# Patient Record
Sex: FEMALE | Race: BLACK OR AFRICAN AMERICAN | Employment: FULL TIME | URBAN - METROPOLITAN AREA
[De-identification: names, ages, dates, MRNs, and addresses within clinical notes are randomized per-mention and may not be internally consistent; named-entity substitution may affect disease eponyms.]

---

## 2017-03-16 ENCOUNTER — HOSPITAL ENCOUNTER (OUTPATIENT)
Dept: MRI IMAGING | Age: 42
Discharge: HOME | End: 2017-03-16
Attending: PAIN MEDICINE
Payer: COMMERCIAL

## 2017-03-16 ENCOUNTER — OFFICE VISIT (OUTPATIENT)
Dept: FAMILY MEDICINE CLINIC | Age: 42
End: 2017-03-16

## 2017-03-16 VITALS
HEART RATE: 83 BPM | TEMPERATURE: 98.7 F | BODY MASS INDEX: 46.06 KG/M2 | WEIGHT: 286.6 LBS | OXYGEN SATURATION: 97 % | RESPIRATION RATE: 16 BRPM | HEIGHT: 66 IN | DIASTOLIC BLOOD PRESSURE: 78 MMHG | SYSTOLIC BLOOD PRESSURE: 111 MMHG

## 2017-03-16 DIAGNOSIS — Z83.3 FAMILY HISTORY OF DIABETES MELLITUS: ICD-10-CM

## 2017-03-16 DIAGNOSIS — G43.009 MIGRAINE WITHOUT AURA AND WITHOUT STATUS MIGRAINOSUS, NOT INTRACTABLE: ICD-10-CM

## 2017-03-16 DIAGNOSIS — M54.50 LOW BACK PAIN: ICD-10-CM

## 2017-03-16 DIAGNOSIS — K21.9 GASTROESOPHAGEAL REFLUX DISEASE WITHOUT ESOPHAGITIS: ICD-10-CM

## 2017-03-16 DIAGNOSIS — M51.36 DEGENERATIVE DISC DISEASE, LUMBAR: ICD-10-CM

## 2017-03-16 DIAGNOSIS — J45.20 MILD INTERMITTENT ASTHMA WITHOUT COMPLICATION: Primary | ICD-10-CM

## 2017-03-16 DIAGNOSIS — E55.9 VITAMIN D DEFICIENCY: ICD-10-CM

## 2017-03-16 DIAGNOSIS — Z98.51 H/O TUBAL LIGATION: ICD-10-CM

## 2017-03-16 DIAGNOSIS — I10 ESSENTIAL HYPERTENSION: ICD-10-CM

## 2017-03-16 DIAGNOSIS — Z79.899 LONG-TERM USE OF HIGH-RISK MEDICATION: ICD-10-CM

## 2017-03-16 DIAGNOSIS — D50.0 IRON DEFICIENCY ANEMIA DUE TO CHRONIC BLOOD LOSS: ICD-10-CM

## 2017-03-16 DIAGNOSIS — G35 MULTIPLE SCLEROSIS (HCC): ICD-10-CM

## 2017-03-16 PROCEDURE — 72148 MRI LUMBAR SPINE W/O DYE: CPT

## 2017-03-16 RX ORDER — ERGOCALCIFEROL 1.25 MG/1
50000 CAPSULE ORAL
Qty: 12 CAP | Refills: 1 | Status: SHIPPED | OUTPATIENT
Start: 2017-03-16 | End: 2018-02-14 | Stop reason: ALTCHOICE

## 2017-03-16 RX ORDER — OMEPRAZOLE 20 MG/1
CAPSULE, DELAYED RELEASE ORAL
Qty: 90 CAP | Refills: 5 | Status: CANCELLED | OUTPATIENT
Start: 2017-03-16

## 2017-03-16 RX ORDER — TERIFLUNOMIDE 14 MG/1
TABLET, FILM COATED ORAL
COMMUNITY
Start: 2017-03-07 | End: 2018-08-29 | Stop reason: ALTCHOICE

## 2017-03-16 RX ORDER — OMEPRAZOLE 40 MG/1
CAPSULE, DELAYED RELEASE ORAL
COMMUNITY
Start: 2017-03-14 | End: 2017-03-16 | Stop reason: SDUPTHER

## 2017-03-16 RX ORDER — LOSARTAN POTASSIUM 50 MG/1
50 TABLET ORAL DAILY
Qty: 90 TAB | Refills: 0 | Status: SHIPPED | OUTPATIENT
Start: 2017-03-16 | End: 2017-05-30 | Stop reason: SDUPTHER

## 2017-03-16 RX ORDER — CETIRIZINE HCL 10 MG
TABLET ORAL
Refills: 1 | COMMUNITY
Start: 2016-12-08 | End: 2018-07-27 | Stop reason: SDUPTHER

## 2017-03-16 RX ORDER — ERGOCALCIFEROL 1.25 MG/1
CAPSULE ORAL
COMMUNITY
Start: 2017-03-13 | End: 2017-03-16 | Stop reason: SDUPTHER

## 2017-03-16 RX ORDER — FLUTICASONE PROPIONATE AND SALMETEROL 250; 50 UG/1; UG/1
1 POWDER RESPIRATORY (INHALATION) EVERY 12 HOURS
COMMUNITY
End: 2017-06-05 | Stop reason: SDUPTHER

## 2017-03-16 RX ORDER — HYDROCHLOROTHIAZIDE 25 MG/1
25 TABLET ORAL DAILY
Qty: 90 TAB | Refills: 0 | Status: SHIPPED | OUTPATIENT
Start: 2017-03-16 | End: 2017-05-30 | Stop reason: SDUPTHER

## 2017-03-16 RX ORDER — OMEPRAZOLE 40 MG/1
40 CAPSULE, DELAYED RELEASE ORAL DAILY
Qty: 90 CAP | Refills: 0 | Status: SHIPPED | OUTPATIENT
Start: 2017-03-16 | End: 2017-05-30 | Stop reason: SDUPTHER

## 2017-03-16 NOTE — PROGRESS NOTES
Serg Alcantar is a 40 y.o. female, who's new to our practice. Previous PCP is Leo Montes NP,      She has moved. Multiple medical issue reviewed and added to chart personally. MS: see's neurologist    Migraine HA, doing well, controlled. Take Fioricet PRN. HTN: BP at goal.    Asthma: mild, controlled. Anemia of iron deficiency: history of this, does have symptoms of fatigue. Will check labs. GERD: symptoms. Denies N/V, melena or blood in stool. Will start on prilosec. Reviewed: active problem list, medication list, allergies, family history, social history, notes from last encounter, lab results    A comprehensive review of systems was negative except for that written in the HPI. No Known Allergies  Current Outpatient Prescriptions on File Prior to Visit   Medication Sig Dispense Refill    albuterol (PROVENTIL VENTOLIN) 2.5 mg /3 mL (0.083 %) nebulizer solution 3 mL by Nebulization route every four (4) hours as needed for Wheezing. 200 Each 5    albuterol (PROVENTIL HFA) 90 mcg/actuation inhaler Take 2 Puffs by inhalation every four (4) hours as needed for Wheezing (and cough). 1 Inhaler 1    butalbital-acetaminophen-caffeine (FIORICET, ESGIC) -40 mg per tablet Take 1 Tab by mouth every six (6) hours as needed for Pain. Max Daily Amount: 4 Tabs. Max 6 /24 hours 30 Tab 1     No current facility-administered medications on file prior to visit.       Patient Active Problem List   Diagnosis Code    HTN (hypertension) I10    Multiple sclerosis ([facility] [address] G35    Heavy menstrual period N92.0    Anemia D64.9    Obesity, Class III, BMI 40-49.9 (morbid obesity) (HCC) E66.01    Mild intermittent asthma without complication Z08.19    Vitamin D deficiency E55.9    Iron deficiency anemia due to chronic blood loss D50.0    Migraine without aura and without status migrainosus, not intractable G43.009    Family history of diabetes mellitus Z83.3    H/O tubal ligation Z98.51  Gastroesophageal reflux disease without esophagitis K21.9       Visit Vitals    /78 (BP 1 Location: Right arm, BP Patient Position: Sitting)    Pulse 83    Temp 98.7 °F (37.1 °C) (Oral)    Resp 16    Ht 5' 6\" (1.676 m)    Wt 286 lb 9.6 oz (130 kg)    LMP 03/02/2017    SpO2 97%    BMI 46.26 kg/m2     General appearance: alert, cooperative, no distress, appears stated age  Head: Normocephalic, without obvious abnormality, atraumatic  Eyes: conjunctivae/corneas clear. PERRL, EOM's intact. Lungs: clear to auscultation bilaterally  Heart: regular rate and rhythm, S1, S2 normal, no murmur, click, rub or gallop  Extremities: extremities normal, atraumatic, no cyanosis or edema  Pulses: 2+ and symmetric  Neurologic: Grossly normal      Assessment/Plans:    [address] Paz was seen today for establish care. Diagnoses and all orders for this visit:    Mild intermittent asthma without complication    Essential hypertension  -     hydroCHLOROthiazide (HYDRODIURIL) 25 mg tablet; Take 1 Tab by mouth daily. -     losartan (COZAAR) 50 mg tablet; Take 1 Tab by mouth daily.  -     TSH RFX ON ABNORMAL TO FREE T4  -     METABOLIC PANEL, COMPREHENSIVE    Multiple sclerosis (HCC)    Vitamin D deficiency  -     ergocalciferol (ERGOCALCIFEROL) 50,000 unit capsule; Take 1 Cap by mouth every seven (7) days. Iron deficiency anemia due to chronic blood loss  -     CBC W/O DIFF    Migraine without aura and without status migrainosus, not intractable    Family history of diabetes mellitus    H/O tubal ligation    Gastroesophageal reflux disease without esophagitis  -     omeprazole (PRILOSEC) 40 mg capsule; Take 1 Cap by mouth daily. Long-term use of high-risk medication  -     TSH RFX ON ABNORMAL TO FREE T4  -     METABOLIC PANEL, COMPREHENSIVE  -     CBC W/O DIFF    Discussed plans, risk/benefits of treatments/observations. Through the use of shared decision making, above plans were agreed upon.    Medication compliance advised. Patient verbalized understanding.      Follow-up Disposition:  Return in about 2 weeks (around 3/30/2017) for annual exam.      Channing Rizzo MD  3/16/2017

## 2017-03-16 NOTE — PROGRESS NOTES
Chief Complaint   Patient presents with   [facility].     wants thyroid and liver test per neurologist Dr. Diann Habermann

## 2017-03-16 NOTE — MR AVS SNAPSHOT
Visit Information Date & Time Provider Department Dept. Phone Encounter #  
 3/16/2017  2:30 PM Sujatha Syed MD [facility] at [address] [id number] Follow-up Instructions Return for annual exam.  
  
Your Appointments 3/21/2017  9:15 AM  
YEAR CHECK UP with Michael Guadalupe MD  
[facility] ([address]) Appt Note: annual exam  
 [place] [address] [facility] [id number]  
[phone]  
  
   
 [place] [address] Upcoming Health Maintenance Date Due DTaP/Tdap/Td series (1 - Tdap) 3/2/1997 INFLUENZA AGE 9 TO ADULT 8/1/2016 PAP AKA CERVICAL CYTOLOGY 12/3/2018 Allergies as of 3/16/2017  Review Complete On: 3/16/2017 By: Sujatha Syed MD  
 No Known Allergies Current Immunizations  Never Reviewed Name Date Influenza Vaccine Intradermal PF 10/9/2015 Not reviewed this visit You Were Diagnosed With   
  
 Codes Comments Mild intermittent asthma without complication    -  Primary ICD-10-CM: J45.20 ICD-9-CM: 493.90 Essential hypertension     ICD-10-CM: I10 
ICD-9-CM: 401.9 Multiple sclerosis ([email or web address]     ICD-10-CM: G35 
ICD-9-CM: 151 Vitamin D deficiency     ICD-10-CM: E55.9 ICD-9-CM: 268.9 Iron deficiency anemia due to chronic blood loss     ICD-10-CM: D50.0 ICD-9-CM: 280.0 Migraine without aura and without status migrainosus, not intractable     ICD-10-CM: P05.873 ICD-9-CM: 346.10 Family history of diabetes mellitus     ICD-10-CM: Z83.3 ICD-9-CM: V18.0 H/O tubal ligation     ICD-10-CM: Z98.51 
ICD-9-CM: V26.51 Gastroesophageal reflux disease without esophagitis     ICD-10-CM: K21.9 ICD-9-CM: 530.81 Long-term use of high-risk medication     ICD-10-CM: Z79.899 ICD-9-CM: V58.69 Vitals BP Pulse Temp Resp Height(growth percentile) Weight(growth percentile) 111/78 (BP 1 Location: Right arm, BP Patient Position: Sitting) 83 98.7 °F (37.1 °C) (Oral) 16 5' 6\" (1.676 m) 286 lb 9.6 oz (130 kg) LMP SpO2 BMI OB Status Smoking Status 03/02/2017 97% 46.26 kg/m2 Having regular periods Never Smoker BMI and BSA Data Body Mass Index Body Surface Area  
 46.26 kg/m 2 2.46 m 2 Preferred Pharmacy Pharmacy Name Phone [facility] [phone] Your Updated Medication List  
  
   
This list is accurate as of: 3/16/17  3:24 PM.  Always use your most recent med list.  
  
  
  
  
 Kash Christians 250-50 mcg/dose diskus inhaler Generic drug:  fluticasone-salmeterol Take 1 Puff by inhalation every twelve (12) hours. * albuterol 90 mcg/actuation inhaler Commonly known as:  PROVENTIL HFA Take 2 Puffs by inhalation every four (4) hours as needed for Wheezing (and cough). * albuterol 2.5 mg /3 mL (0.083 %) nebulizer solution Commonly known as:  PROVENTIL VENTOLIN  
3 mL by Nebulization route every four (4) hours as needed for Wheezing. AUBAGIO 14 mg Tab Generic drug:  teriflunomide  
  
 butalbital-acetaminophen-caffeine -40 mg per tablet Commonly known as:  Sang Pickles Take 1 Tab by mouth every six (6) hours as needed for Pain. Max Daily Amount: 4 Tabs. Max 6 /24 hours  
  
 cetirizine 10 mg tablet Commonly known as:  ZYRTEC TK 1 T PO ONCE DAILY  
  
 ergocalciferol 50,000 unit capsule Commonly known as:  ERGOCALCIFEROL Take 1 Cap by mouth every seven (7) days. hydroCHLOROthiazide 25 mg tablet Commonly known as:  HYDRODIURIL Take 1 Tab by mouth daily. losartan 50 mg tablet Commonly known as:  COZAAR Take 1 Tab by mouth daily. omeprazole 40 mg capsule Commonly known as:  PRILOSEC Take 1 Cap by mouth daily. * Notice: This list has 2 medication(s) that are the same as other medications prescribed for you. Read the directions carefully, and ask your doctor or other care provider to review them with you. Prescriptions Sent to Pharmacy Refills  
 omeprazole (PRILOSEC) 40 mg capsule 0 Sig: Take 1 Cap by mouth daily. Class: Normal  
 Pharmacy: [facility] [place] - [address] [facility]  AT [address] [place] [address] Ph #: [phone] Route: Oral  
 ergocalciferol (ERGOCALCIFEROL) 50,000 unit capsule 1 Sig: Take 1 Cap by mouth every seven (7) days. Class: Normal  
 Pharmacy: [facility] at [facility] [address] [facility]  AT [address] [place] [address] Ph #: [phone] Route: Oral  
 hydroCHLOROthiazide (HYDRODIURIL) 25 mg tablet 0 Sig: Take 1 Tab by mouth daily. Class: Normal  
 Pharmacy: [facility] at [facility] [address] [facility]  AT [address] [place] [address] Ph #: [phone] Route: Oral  
 losartan (COZAAR) 50 mg tablet 0 Sig: Take 1 Tab by mouth daily. Class: Normal  
 Pharmacy: [facility] [address] [facility]  AT [address] [place] [address] Ph #: [phone] Route: Oral  
  
We Performed the Following CBC W/O DIFF [89678 CPT(R)] METABOLIC PANEL, COMPREHENSIVE [51764 CPT(R)] TSH RFX ON ABNORMAL TO FREE T4 [[id number] Custom] Follow-up Instructions Return for annual exam. To-Do List   
 03/16/2017  6:00 PM  
  Appointment with [address] [address] [facility] 1 at [facility] ([phone]) 1. Please bring a list or a bag of your current medications to your appointment 2. Please be sure to remove ALL hair clips, pins, extensions, etc., prior to arriving for your MRI procedure.  3. Bring any non [facility] films or CDs pertaining to the area being imaged with you on the day of appointment. 4. A written order with a valid diagnosis and Physicians  signature is required for all scheduled tests. 5. Check in at registration 30min before your appointment time unless you were instructed to do otherwise. Introducing [facility] SERVICES! Dear Denisse Bautista: Thank you for requesting a [employer] account. Our records indicate that you already have an active Orche[employer] account. You can access your account anytime at [email or web address] Did you know that you can access your hospital and ER discharge instructions at any time in [employer]? You can also review all of your test results from your hospital stay or ER visit. Additional Information If you have questions, please visit the Frequently Asked Questions section of the Orche[employer]ate website at [email or web address] Remember, Orche[employer]ate is NOT to be used for urgent needs. For medical emergencies, dial 911. Now available from your iPhone and Android! Please provide this summary of care documentation to your next provider. If you have any questions after today's visit, please call [phone].

## 2017-03-17 LAB
ALBUMIN SERPL-MCNC: 4 G/DL (ref 3.5–5.5)
ALBUMIN/GLOB SERPL: 1.3 {RATIO} (ref 1.2–2.2)
ALP SERPL-CCNC: 46 IU/L (ref 39–117)
ALT SERPL-CCNC: 20 IU/L (ref 0–32)
AST SERPL-CCNC: 24 IU/L (ref 0–40)
BILIRUB SERPL-MCNC: <0.2 MG/DL (ref 0–1.2)
BUN SERPL-MCNC: 7 MG/DL (ref 6–24)
BUN/CREAT SERPL: 8 (ref 9–23)
CALCIUM SERPL-MCNC: 9.2 MG/DL (ref 8.7–10.2)
CHLORIDE SERPL-SCNC: 101 MMOL/L (ref 96–106)
CO2 SERPL-SCNC: 25 MMOL/L (ref 18–29)
CREAT SERPL-MCNC: 0.86 MG/DL (ref 0.57–1)
ERYTHROCYTE [DISTWIDTH] IN BLOOD BY AUTOMATED COUNT: 21.9 % (ref 12.3–15.4)
GLOBULIN SER CALC-MCNC: 3 G/DL (ref 1.5–4.5)
GLUCOSE SERPL-MCNC: 83 MG/DL (ref 65–99)
HCT VFR BLD AUTO: 26.3 % (ref 34–46.6)
HGB BLD-MCNC: 7.2 G/DL (ref 11.1–15.9)
MCH RBC QN AUTO: 17.1 PG (ref 26.6–33)
MCHC RBC AUTO-ENTMCNC: 27.4 G/DL (ref 31.5–35.7)
MCV RBC AUTO: 63 FL (ref 79–97)
PLATELET # BLD AUTO: 561 X10E3/UL (ref 150–379)
POTASSIUM SERPL-SCNC: 4.1 MMOL/L (ref 3.5–5.2)
PROT SERPL-MCNC: 7 G/DL (ref 6–8.5)
RBC # BLD AUTO: 4.2 X10E6/UL (ref 3.77–5.28)
SODIUM SERPL-SCNC: 142 MMOL/L (ref 134–144)
TSH SERPL DL<=0.005 MIU/L-ACNC: 1.32 UIU/ML (ref 0.45–4.5)
WBC # BLD AUTO: 6.9 X10E3/UL (ref 3.4–10.8)

## 2017-03-21 ENCOUNTER — HOSPITAL ENCOUNTER (OUTPATIENT)
Dept: LAB | Age: 42
Discharge: HOME | End: 2017-03-21
Payer: COMMERCIAL

## 2017-03-21 ENCOUNTER — OFFICE VISIT (OUTPATIENT)
Dept: OBGYN CLINIC | Age: 42
End: 2017-03-21

## 2017-03-21 VITALS
HEIGHT: 66 IN | BODY MASS INDEX: 45.61 KG/M2 | RESPIRATION RATE: 18 BRPM | DIASTOLIC BLOOD PRESSURE: 77 MMHG | TEMPERATURE: 98.9 F | HEART RATE: 74 BPM | WEIGHT: 283.8 LBS | SYSTOLIC BLOOD PRESSURE: 125 MMHG

## 2017-03-21 DIAGNOSIS — N92.0 MENORRHAGIA WITH REGULAR CYCLE: ICD-10-CM

## 2017-03-21 DIAGNOSIS — E66.01 OBESITY, CLASS III, BMI 40-49.9 (MORBID OBESITY) (HCC): ICD-10-CM

## 2017-03-21 DIAGNOSIS — Z12.31 VISIT FOR SCREENING MAMMOGRAM: ICD-10-CM

## 2017-03-21 DIAGNOSIS — G35 MULTIPLE SCLEROSIS (HCC): ICD-10-CM

## 2017-03-21 DIAGNOSIS — D50.0 IRON DEFICIENCY ANEMIA DUE TO CHRONIC BLOOD LOSS: ICD-10-CM

## 2017-03-21 DIAGNOSIS — I10 ESSENTIAL HYPERTENSION: ICD-10-CM

## 2017-03-21 DIAGNOSIS — Z01.419 WELL WOMAN EXAM WITH ROUTINE GYNECOLOGICAL EXAM: Primary | ICD-10-CM

## 2017-03-21 PROCEDURE — 87624 HPV HI-RISK TYP POOLED RSLT: CPT | Performed by: OBSTETRICS & GYNECOLOGY

## 2017-03-21 PROCEDURE — 88175 CYTOPATH C/V AUTO FLUID REDO: CPT | Performed by: OBSTETRICS & GYNECOLOGY

## 2017-03-21 RX ORDER — ACETAMINOPHEN AND CODEINE PHOSPHATE 120; 12 MG/5ML; MG/5ML
1 SOLUTION ORAL DAILY
Qty: 1 PACKAGE | Refills: 6 | Status: SHIPPED | OUTPATIENT
Start: 2017-03-21 | End: 2017-11-27 | Stop reason: SDUPTHER

## 2017-03-21 NOTE — PROGRESS NOTES
Chief Complaint   Patient presents with    Annual Exam     Patient presents in stable condition; states she has vaginal itching after her menstrual cycle. Patient also states her iron level is very low.

## 2017-03-21 NOTE — PROGRESS NOTES
SUBJECTIVE: Amber Del Valle is a 40 y.o. female who presents desire for annual well woman exam. Patient's last menstrual period was 2017. Complaints of low energy, tiredness and gets winded easily. GYN History  Dysmenorrhea:  NO  Contraception:  tubal ligation  Sexually transmitted diseases/infections: denies  Urinary symptoms:  NO  Dyspareunia: NO    Last pap:   The prior Pap result: Normal.    Past Medical History:   Diagnosis Date    Anemia     Family history of diabetes mellitus 3/16/2017    Gastroesophageal reflux disease without esophagitis 3/16/2017    H/O tubal ligation 3/16/2017    HTN (hypertension) 8/10/2011    Hypertension     Iron deficiency anemia due to chronic blood loss 3/16/2017    Iron deficiency anemia due to chronic blood loss 3/16/2017    Migraine without aura and without status migrainosus, not intractable 3/16/2017    Migraine without aura and without status migrainosus, not intractable 3/16/2017    Mild intermittent asthma without complication 686    Mild intermittent asthma without complication 8643    MS (multiple sclerosis) ([facility] 10/08    using clinical trial medications    Vitamin D deficiency 3/16/2017    Vitamin D deficiency 3/16/2017       Past Surgical History:   Procedure Laterality Date    HX  SECTION      x3; , , &     LAP,TUBAL CAUTERY         Family History   Problem Relation Age of Onset    Diabetes Mother     Hypertension Mother        Social History     Social History    Marital status:      Spouse name: N/A    Number of children: N/A    Years of education: N/A     Occupational History    Not on file.      Social History Main Topics    Smoking status: Never Smoker    Smokeless tobacco: Never Used    Alcohol use 1.0 oz/week     1 Glasses of wine, 1 Shots of liquor per week      Comment: socially    Drug use: No    Sexual activity: Not Currently     Partners: Male     Birth control/ protection: Surgical     Other Topics Concern    Not on file     Social History Narrative       Current Outpatient Prescriptions   Medication Sig Dispense Refill    norethindrone (MICRONOR) 0.35 mg tab Take 1 Tab by mouth daily. 1 Package 6    AUBAGIO 14 mg tab       cetirizine (ZYRTEC) 10 mg tablet TK 1 T PO ONCE DAILY  1    omeprazole (PRILOSEC) 40 mg capsule Take 1 Cap by mouth daily. 90 Cap 0    ergocalciferol (ERGOCALCIFEROL) 50,000 unit capsule Take 1 Cap by mouth every seven (7) days. 12 Cap 1    hydroCHLOROthiazide (HYDRODIURIL) 25 mg tablet Take 1 Tab by mouth daily. 90 Tab 0    losartan (COZAAR) 50 mg tablet Take 1 Tab by mouth daily. 90 Tab 0    fluticasone-salmeterol (ADVAIR DISKUS) 250-50 mcg/dose diskus inhaler Take 1 Puff by inhalation every twelve (12) hours.  albuterol (PROVENTIL HFA) 90 mcg/actuation inhaler Take 2 Puffs by inhalation every four (4) hours as needed for Wheezing (and cough). 1 Inhaler 1    butalbital-acetaminophen-caffeine (FIORICET, ESGIC) -40 mg per tablet Take 1 Tab by mouth every six (6) hours as needed for Pain. Max Daily Amount: 4 Tabs. Max 6 /24 hours 30 Tab 1    albuterol (PROVENTIL VENTOLIN) 2.5 mg /3 mL (0.083 %) nebulizer solution 3 mL by Nebulization route every four (4) hours as needed for Wheezing. 200 Each 5         Review of Systems:   Complete review of systems reviewed from social and history data forms. Pertinent positives in HPI.       Objective:     Visit Vitals    /77 (BP 1 Location: Right arm, BP Patient Position: Sitting)    Pulse 74    Temp 98.9 °F (37.2 °C) (Oral)    Resp 18    Ht 5' 6\" (1.676 m)    Wt 283 lb 12.8 oz (128.7 kg)    LMP 03/02/2017    BMI 45.81 kg/m2       General:  alert, cooperative, no distress, appears stated age   Skin:  Normal.   Lymph Nodes:  Cervical, supraclavicular, and axillary nodes normal.   Breast Exam: normal appearance, no masses or tenderness    Lungs:  clear to auscultation bilaterally Heart:  regular rate and rhythm, S1, S2 normal, no murmur, click, rub or gallop   Abdomen: soft, non-tender. Bowel sounds normal. No masses,  no organomegaly   Back:  Costovertebral angle tenderness absent   Genitourinary: BUS normal. Introitus normal. Normal appearing vaginal epithelium, Vaginal discharge described as normal and physiologic.,    Normal cervix without lesions or tenderness, Uterus normal size anteverted. NT., Adnexa normal in size left and right without tenderness. Extremities:  extremities normal, atraumatic, no cyanosis or edema     Neurologic:  negative   Psychiatric:  non focal     Hemoglobin 7.5    ASSESSMENT:      ICD-10-CM ICD-9-CM    1. Well woman exam with routine gynecological exam Z01.419 V72.31    2. Menorrhagia with regular cycle N92.0 626.2 US PELV NON OBS      US TRANSVAGINAL   3. Iron deficiency anemia due to chronic blood loss D50.0 280.0 REFERRAL TO HEMATOLOGY ONCOLOGY   4. Obesity, Class III, BMI 40-49.9 (morbid obesity) ([facility]) E66.01 278.01 HEMOGLOBIN A1C WITH EAG      LIPID PANEL   5. Visit for screening mammogram Z12.31 V76.12 [facility] MAMMO BI SCREENING INCL CAD   6. Multiple sclerosis ([facility] [address] G35 340    7. Essential hypertension I10 401.9        Discussed anemia and heavy menses  Plan for ultrasound to evaluated for abnormal anatomical structures  Consider Endometrial ablation as well as IUD    Will start on Progesterone only contraception at this time secondary to HTN  And MS    Referral to [facility] for possible iron transfusion    Follow-up Disposition:  Return in about 3 months (around 6/21/2017) for follow up ultrasound . Today's care was reviewed and discussed with the patient. She expresses understanding and approval of today's plan.       Jose Eduardo Syed MD

## 2017-03-21 NOTE — PATIENT INSTRUCTIONS
Heavy Menstrual Periods: Care Instructions  Your Care Instructions    Many women get heavy menstrual periods and painful cramps. For some women, this means passing large blood clots and changing sanitary pads or tampons often. You may also have periods that last longer than 7 days. A change in hormones or an irritation in the uterus can cause heavy bleeding. Women who are overweight are more likely to have heavy menstrual periods. But there may not be a specific cause for your heavy menstrual periods. Your doctor may recommend hormone treatments to slow or stop your periods. If a fibroid (a growth that is not cancer) is causing your heavy bleeding, your doctor may recommend surgery or other treatments to remove the growth. Because blood loss from heavy menstrual periods can make you very tired and weak (anemic), your doctor may recommend that you take extra iron. Follow-up care is a key part of your treatment and safety. Be sure to make and go to all appointments, and call your doctor if you are having problems. It's also a good idea to know your test results and keep a list of the medicines you take. How can you care for yourself at home? · Get plenty of rest.  · Keep a record of your periods. Write down when your period begins and ends and how much flow you have. That means counting the number of pads and tampons you use. Note whether they are soaked. Note any other symptoms. Take this record to your doctor appointments. · Take your medicines exactly as prescribed. Call your doctor if you think you are having a problem with your medicine. · Take pain medicines exactly as directed. ¨ If the doctor gave you a prescription medicine for pain, take it as prescribed. ¨ If you are not taking a prescription pain medicine, ask your doctor if you can take an over-the-counter medicine. · Try to reach a healthy weight. If you are trying to lose weight, do it slowly with your doctor's advice.   · If you are taking iron pills:  ¨ Try to take the pills about 1 hour before or 2 hours after meals. But you may need to take iron with some food to avoid an upset stomach. ¨ Vitamin C (from food or pills) helps your body absorb iron. Try taking iron pills with a glass of orange juice or other citrus fruit juice. ¨ Do not take antacids or drink milk or caffeine drinks (such as coffee, tea, or cola) at the same time or within 2 hours of the time that you take your iron. They can make it hard for your body to absorb the iron. ¨ Iron pills may cause stomach problems, such as heartburn, nausea, diarrhea, constipation, and cramps. Be sure to drink plenty of fluids, and include fruits, vegetables, and fiber in your diet each day. ¨ If you forget to take an iron pill, do not take a double dose of iron the next time you take a pill. ¨ Keep iron pills out of the reach of small children. An overdose of iron can be very dangerous. When should you call for help? Call 911 anytime you think you may need emergency care. For example, call if:  · You passed out (lost consciousness). · You have sudden, severe pain in your belly or pelvis. Call your doctor now or seek immediate medical care if:  · You have severe vaginal bleeding. This means that you are soaking through your usual pads or tampons each hour for 2 or more hours. · You are dizzy or lightheaded, or you feel like you may faint. · You have belly or pelvic pain when not menstruating. · You have a fever. · You have vaginal discharge with a bad odor. Watch closely for changes in your health, and be sure to contact your doctor if:  · Your heavy periods are disrupting your life. · You have vaginal bleeding when you do not expect it or after menopause. · You do not get better as expected. Where can you learn more? Go to http://shani-mark.info/. Enter F477 in the search box to learn more about \"Heavy Menstrual Periods: Care Instructions. \"  Current as of: February 25, 2016  Content Version: 11.1  © 8582-2993 [employer]. Care instructions adapted under license by [employer] (which disclaims liability or warranty for this information). If you have questions about a medical condition or this instruction, always ask your healthcare professional. [address] any warranty or liability for your use of this information.

## 2017-03-21 NOTE — MR AVS SNAPSHOT
Visit Information Date & Time Provider Department Dept. Phone Encounter #  
 3/21/2017  9:15 AM Mckenzie Ma Shayy [phone] [id number] Follow-up Instructions Return in about 3 months (around 6/21/2017) for follow up ultrasound . Your Appointments 4/17/2017  8:30 AM  
COMPLETE PHYSICAL with Kiana Hernández MD  
[facility] at [facility] Appt Note: annual exam  
 [facility] [address] [address]  
[facility] Upcoming Health Maintenance Date Due INFLUENZA AGE 9 TO ADULT 8/1/2016 PAP AKA CERVICAL CYTOLOGY 12/3/2018 Allergies as of 3/21/2017  Review Complete On: 3/21/2017 By: Mckenzie Ma MD  
 No Known Allergies Current Immunizations  Never Reviewed Name Date Influenza Vaccine Intradermal PF 10/9/2015 Not reviewed this visit You Were Diagnosed With   
  
 Codes Comments Well woman exam with routine gynecological exam    -  Primary ICD-10-CM: Y92.220 ICD-9-CM: V72.31 Menorrhagia with regular cycle     ICD-10-CM: N92.0 ICD-9-CM: 626.2 Iron deficiency anemia due to chronic blood loss     ICD-10-CM: D50.0 ICD-9-CM: 280.0 Obesity, Class III, BMI 40-49.9 (morbid obesity) (HCC)     ICD-10-CM: E66.01 
ICD-9-CM: 278.01 Visit for screening mammogram     ICD-10-CM: Z12.31 
ICD-9-CM: V76.12 Vitals BP Pulse Temp Resp Height(growth percentile) Weight(growth percentile) 125/77 (BP 1 Location: Right arm, BP Patient Position: Sitting) 74 98.9 °F (37.2 °C) (Oral) 18 5' 6\" (1.676 m) 283 lb 12.8 oz (128.7 kg) LMP BMI OB Status Smoking Status 03/02/2017 45.81 kg/m2 Having regular periods Never Smoker Vitals History BMI and BSA Data Body Mass Index Body Surface Area 45.81 kg/m 2 2.45 m 2 Preferred Pharmacy Pharmacy Name Phone [facility] AT [address] [place] [address] AT [address] [phone] Your Updated Medication List  
  
   
This list is accurate as of: 3/21/17 10:01 AM.  Always use your most recent med list.  
  
  
  
  
 Kamryn Gay 250-50 mcg/dose diskus inhaler Generic drug:  fluticasone-salmeterol Take 1 Puff by inhalation every twelve (12) hours. * albuterol 90 mcg/actuation inhaler Commonly known as:  PROVENTIL HFA Take 2 Puffs by inhalation every four (4) hours as needed for Wheezing (and cough). * albuterol 2.5 mg /3 mL (0.083 %) nebulizer solution Commonly known as:  PROVENTIL VENTOLIN  
3 mL by Nebulization route every four (4) hours as needed for Wheezing. AUBAGIO 14 mg Tab Generic drug:  teriflunomide  
  
 butalbital-acetaminophen-caffeine -40 mg per tablet Commonly known as:  Lavella Amble Take 1 Tab by mouth every six (6) hours as needed for Pain. Max Daily Amount: 4 Tabs. Max 6 /24 hours  
  
 cetirizine 10 mg tablet Commonly known as:  ZYRTEC TK 1 T PO ONCE DAILY  
  
 ergocalciferol 50,000 unit capsule Commonly known as:  ERGOCALCIFEROL Take 1 Cap by mouth every seven (7) days. hydroCHLOROthiazide 25 mg tablet Commonly known as:  HYDRODIURIL Take 1 Tab by mouth daily. losartan 50 mg tablet Commonly known as:  COZAAR Take 1 Tab by mouth daily. norethindrone 0.35 mg Tab Commonly known as:  Manjeet & Manjeet Take 1 Tab by mouth daily. omeprazole 40 mg capsule Commonly known as:  PRILOSEC Take 1 Cap by mouth daily. * Notice: This list has 2 medication(s) that are the same as other medications prescribed for you. Read the directions carefully, and ask your doctor or other care provider to review them with you. Prescriptions Sent to Pharmacy Refills  
 norethindrone (MICRONOR) 0.35 mg tab 6 Sig: Take 1 Tab by mouth daily.   
 Class: Normal  
 Pharmacy: [facility] at [address] - [address] [facility] [address] AT [address] [place] [address] #: [phone] Route: Oral  
  
We Performed the Following HEMOGLOBIN A1C WITH EAG [68715 CPT(R)] LIPID PANEL [18943 CPT(R)] REFERRAL TO HEMATOLOGY ONCOLOGY [AET53 Custom] Comments:  
 Please evaluate patient for anemia, possible iron transfusion Johann Chao Follow-up Instructions Return in about 3 months (around 6/21/2017) for follow up ultrasound . To-Do List   
 03/21/2017 Imaging:  RORO MAMMO BI SCREENING INCL CAD   
  
 03/21/2017 Imaging:  US PELV NON OBS   
  
 03/21/2017 Imaging:  US TRANSVAGINAL Referral Information Referral ID Referred By Referred To  
  
 [id number] Sameer Vargas MD   
   [address] [place] [address] [address] [place] [address] S [facility] Phone: [phone] Fax: [phone] Visits Status Start Date End Date 1 New Request 3/21/17 3/21/18 If your referral has a status of pending review or denied, additional information will be sent to support the outcome of this decision. Patient Instructions Heavy Menstrual Periods: Care Instructions Your Care Instructions Many women get heavy menstrual periods and painful cramps. For some women, this means passing large blood clots and changing sanitary pads or tampons often. You may also have periods that last longer than 7 days. A change in hormones or an irritation in the uterus can cause heavy bleeding. Women who are overweight are more likely to have heavy menstrual periods. But there may not be a specific cause for your heavy menstrual periods. Your doctor may recommend hormone treatments to slow or stop your periods.  If a fibroid (a growth that is not cancer) is causing your heavy bleeding, your doctor may recommend surgery or other treatments to remove the growth. Because blood loss from heavy menstrual periods can make you very tired and weak (anemic), your doctor may recommend that you take extra iron. Follow-up care is a key part of your treatment and safety. Be sure to make and go to all appointments, and call your doctor if you are having problems. It's also a good idea to know your test results and keep a list of the medicines you take. How can you care for yourself at home? · Get plenty of rest. 
· Keep a record of your periods. Write down when your period begins and ends and how much flow you have. That means counting the number of pads and tampons you use. Note whether they are soaked. Note any other symptoms. Take this record to your doctor appointments. · Take your medicines exactly as prescribed. Call your doctor if you think you are having a problem with your medicine. · Take pain medicines exactly as directed. ¨ If the doctor gave you a prescription medicine for pain, take it as prescribed. ¨ If you are not taking a prescription pain medicine, ask your doctor if you can take an over-the-counter medicine. · Try to reach a healthy weight. If you are trying to lose weight, do it slowly with your doctor's advice. · If you are taking iron pills: ¨ Try to take the pills about 1 hour before or 2 hours after meals. But you may need to take iron with some food to avoid an upset stomach. ¨ Vitamin C (from food or pills) helps your body absorb iron. Try taking iron pills with a glass of orange juice or other citrus fruit juice. ¨ Do not take antacids or drink milk or caffeine drinks (such as coffee, tea, or cola) at the same time or within 2 hours of the time that you take your iron. They can make it hard for your body to absorb the iron. ¨ Iron pills may cause stomach problems, such as heartburn, nausea, diarrhea, constipation, and cramps. Be sure to drink plenty of fluids, and include fruits, vegetables, and fiber in your diet each day. ¨ If you forget to take an iron pill, do not take a double dose of iron the next time you take a pill. ¨ Keep iron pills out of the reach of small children. An overdose of iron can be very dangerous. When should you call for help? Call 911 anytime you think you may need emergency care. For example, call if: 
· You passed out (lost consciousness). · You have sudden, severe pain in your belly or pelvis. Call your doctor now or seek immediate medical care if: 
· You have severe vaginal bleeding. This means that you are soaking through your usual pads or tampons each hour for 2 or more hours. · You are dizzy or lightheaded, or you feel like you may faint. · You have belly or pelvic pain when not menstruating. · You have a fever. · You have vaginal discharge with a bad odor. Watch closely for changes in your health, and be sure to contact your doctor if: 
· Your heavy periods are disrupting your life. · You have vaginal bleeding when you do not expect it or after menopause. · You do not get better as expected. Where can you learn more? Go to http://shani-mark.info/. Enter F477 in the search box to learn more about \"Heavy Menstrual Periods: Care Instructions. \" Current as of: February 25, 2016 Content Version: 11.1 © 3186-9584 [employer]. Care instructions adapted under license by [employer] (which disclaims liability or warranty for this information). If you have questions about a medical condition or this instruction, always ask your healthcare professional. [place] [address] any warranty or liability for your use of this information. Introducing [facility] & HE[facility] SERVICES! Dear Hemalatha Freed: Thank you for requesting a [employer] account. Our records indicate that you already have an active [employer] account. You can access your account anytime at [email or web address]. [email or web address]/[email or web address] [employer]ies Did you know that you can access your hospital and ER discharge instructions at any time in [employer])? You can also review all of your test results from your hospital stay or ER visit. Additional Information If you have questions, please visit the Frequently Asked Questions section of the [employer]) website at [email or web address] Remember, [employer] ([employer]) is NOT to be used for urgent needs. For medical emergencies, dial 911. Now available from your iPhone and Android! Please provide this summary of care documentation to your next provider. Your primary care clinician is listed as Lucy Bernard. If you have any questions after today's visit, please call [phone].

## 2017-03-22 ENCOUNTER — DOCUMENTATION ONLY (OUTPATIENT)
Dept: ONCOLOGY | Age: 42
End: 2017-03-22

## 2017-03-23 LAB
C TRACH RRNA SPEC QL NAA+PROBE: NEGATIVE
N GONORRHOEA RRNA SPEC QL NAA+PROBE: NEGATIVE

## 2017-03-24 ENCOUNTER — TELEPHONE (OUTPATIENT)
Dept: FAMILY MEDICINE CLINIC | Age: 42
End: 2017-03-24

## 2017-03-24 NOTE — TELEPHONE ENCOUNTER
SPoke to ms. Lopez about her labs. Severely anemic hgb 7.2, microcytic. Have seen her OBGYN. Have started her on birth control to regulate prolonged menses and heavy bleeding. Have ordered US pelvic. She also have appointment with Heme/onc for iron infusion. She's SOB when walking for long. If resting or still or home activities asymptomatic. We can do further testing when f/u sooner.     Kayleigh Michel MD  3/24/2017

## 2017-03-27 RX ORDER — METRONIDAZOLE 500 MG/1
2000 TABLET ORAL
Qty: 4 TAB | Refills: 0 | Status: SHIPPED | OUTPATIENT
Start: 2017-03-27 | End: 2017-03-30 | Stop reason: ALTCHOICE

## 2017-03-27 NOTE — PROGRESS NOTES
Patient informed on results and that medication was sent to her pharmacy for treatment, which was confirmed. Patient also informed that a letter with pap smear results would be mailed to her; patient verbalized understanding of discussion.

## 2017-03-30 ENCOUNTER — OFFICE VISIT (OUTPATIENT)
Dept: FAMILY MEDICINE CLINIC | Age: 42
End: 2017-03-30

## 2017-03-30 VITALS
WEIGHT: 280.6 LBS | BODY MASS INDEX: 45.1 KG/M2 | TEMPERATURE: 98.6 F | DIASTOLIC BLOOD PRESSURE: 77 MMHG | SYSTOLIC BLOOD PRESSURE: 115 MMHG | HEIGHT: 66 IN | HEART RATE: 84 BPM

## 2017-03-30 DIAGNOSIS — E66.01 OBESITY, CLASS III, BMI 40-49.9 (MORBID OBESITY) (HCC): ICD-10-CM

## 2017-03-30 DIAGNOSIS — D50.0 IRON DEFICIENCY ANEMIA DUE TO CHRONIC BLOOD LOSS: Primary | ICD-10-CM

## 2017-03-30 DIAGNOSIS — Z23 ENCOUNTER FOR IMMUNIZATION: ICD-10-CM

## 2017-03-30 DIAGNOSIS — Z83.3 FAMILY HISTORY OF DIABETES MELLITUS: ICD-10-CM

## 2017-03-30 DIAGNOSIS — N92.0 MENORRHAGIA WITH REGULAR CYCLE: ICD-10-CM

## 2017-03-30 NOTE — MR AVS SNAPSHOT
Visit Information Date & Time Provider Department Dept. Phone Encounter #  
 3/30/2017  3:30 PM Alvaro Zaragoza MD [facility] at [address] [id number] Follow-up Instructions Return in about 5 months (around 8/30/2017) for htn, meds, labs, sooner if labs abnormal.  
  
Your Appointments 4/17/2017  8:30 AM  
COMPLETE PHYSICAL with Alvaro Zaragoza MD  
[facility] at [facility] [address] [facility]) Appt Note: annual exam  
 [facility] [address] [address]  
[facility]  
  
    
 6/23/2017  9:30 AM  
3 MONTH with Gregory Smith MD  
[facility] ([address] [facility]) Appt Note: 3 month u/s follow up [place] [address] [facility] [address]  
[phone]  
  
   
 [place] [address] Upcoming Health Maintenance Date Due  
 PAP AKA CERVICAL CYTOLOGY 3/21/2020 DTaP/Tdap/Td series (2 - Td) 3/30/2027 Allergies as of 3/30/2017  Review Complete On: 3/30/2017 By: Alvaro Zaragoza MD  
 No Known Allergies Current Immunizations  Never Reviewed Name Date Influenza Vaccine Intradermal PF 10/9/2015 Td, Adsorbed PF  Incomplete Not reviewed this visit You Were Diagnosed With   
  
 Codes Comments Iron deficiency anemia due to chronic blood loss    -  Primary ICD-10-CM: D50.0 ICD-9-CM: 280.0 Menorrhagia with regular cycle     ICD-10-CM: N92.0 ICD-9-CM: 626.2 Encounter for immunization     ICD-10-CM: Z83 ICD-9-CM: V03.89 Family history of diabetes mellitus     ICD-10-CM: Z83.3 ICD-9-CM: V18.0 Obesity, Class III, BMI 40-49.9 (morbid obesity) (HCC)     ICD-10-CM: E66.01 
ICD-9-CM: 278.01 Vitals BP Pulse Temp Height(growth percentile) Weight(growth percentile) LMP  
 115/77 84 98.6 °F (37 °C) (Oral) 5' 6\" (1.676 m) 280 lb 9.6 oz (127.3 kg) 03/30/2017 BMI OB Status Smoking Status 45.29 kg/m2 Having regular periods Never Smoker Vitals History BMI and BSA Data Body Mass Index Body Surface Area  
 45.29 kg/m 2 2.43 m 2 Preferred Pharmacy Pharmacy Name Phone [facility] AT [facility] AT [address] [phone] Your Updated Medication List  
  
   
This list is accurate as of: 3/30/17  3:52 PM.  Always use your most recent med list.  
  
  
  
  
 Raiza Peel 250-50 mcg/dose diskus inhaler Generic drug:  fluticasone-salmeterol Take 1 Puff by inhalation every twelve (12) hours. * albuterol 90 mcg/actuation inhaler Commonly known as:  PROVENTIL HFA Take 2 Puffs by inhalation every four (4) hours as needed for Wheezing (and cough). * albuterol 2.5 mg /3 mL (0.083 %) nebulizer solution Commonly known as:  PROVENTIL VENTOLIN  
3 mL by Nebulization route every four (4) hours as needed for Wheezing. AUBAGIO 14 mg Tab Generic drug:  teriflunomide  
  
 butalbital-acetaminophen-caffeine -40 mg per tablet Commonly known as:  Kolby Ends Take 1 Tab by mouth every six (6) hours as needed for Pain. Max Daily Amount: 4 Tabs. Max 6 /24 hours  
  
 cetirizine 10 mg tablet Commonly known as:  ZYRTEC TK 1 T PO ONCE DAILY  
  
 ergocalciferol 50,000 unit capsule Commonly known as:  ERGOCALCIFEROL Take 1 Cap by mouth every seven (7) days. hydroCHLOROthiazide 25 mg tablet Commonly known as:  HYDRODIURIL Take 1 Tab by mouth daily. losartan 50 mg tablet Commonly known as:  COZAAR Take 1 Tab by mouth daily. norethindrone 0.35 mg Tab Commonly known as:  Manjeet & Manjeet Take 1 Tab by mouth daily. omeprazole 40 mg capsule Commonly known as:  PRILOSEC Take 1 Cap by mouth daily. * Notice:   This list has 2 medication(s) that are the same as other medications prescribed for you. Read the directions carefully, and ask your doctor or other care provider to review them with you. We Performed the Following HEMOGLOBIN A1C W/O EAG [11231 CPT(R)] IRON PROFILE [id number] CPT(R)] LIPID PANEL [05955 CPT(R)] TETANUS AND DIPHTHERIA TOXOIDS (TD) ADSORBED, PRES. FREE, IN INDIVIDS. >=7, IM [id number] CPT(R)] Follow-up Instructions Return in about 5 months (around 8/30/2017) for htn, meds, labs, sooner if labs abnormal.  
  
To-Do List   
 04/04/2017 12:45 PM  
  Appointment with [facility] 3 at [address] [place] [address] ([phone]) Shower or bathe using soap and water. Do not use deodorant, powder, perfumes, or lotion the day of your exam.  If your prior mammograms were not performed at [facility] please bring films with you or forward prior images 2 days before your procedure. Check in at registration 15min before your appointment time unless you were instructed to do otherwise. A script is not necessary, but if you have one, please bring it on the day of the mammogram or have it faxed to the department. [facility] [phone] [facility]  [phone] [facility] [phone] [address] [facility] [address]  [phone] [facility] [phone] Stephenie Asher [phone]  
  
 04/04/2017 1:00 PM  
  Appointment with [address] [place] [address] 1 at [facility] ([phone]) Adult Patient: Patient should drink 32 to 48 ozs of water, clear liquids, tea or juice one hour prior to exam (no carbonated beverages or milk)  Do not void (urinate). Your bladder must be full for this scan to be successful. Once the exam is completed, you can void immediately. GENERAL INSTRUCTIONS 1. Bring any non [facility] facility films/reports pertaining to the area being studied with you on the day of appointment. 2. A written order with a valid diagnosis and Physicians signature is required for all scheduled tests.  3. Check in at registration 30 minutes before your appointment time unless you were instructed to do otherwise. Pediatric: [place] to 11years of age: -Encourage water,juice or formula the day of the exam. -If the child is toilet trained, TRY NOT to allow the child to void for one hour prior to the study, so that the bladder will be full. -Please bring a full bottle if your child is an infant, as it may be needed during the exam.  Pediatric: 5 years to 25years old -Finish drinking 28 to 50 ozs. of water, clear liquids,tea or juice one hour prior to the exam ( no carbonated beverages or milk) -Do not void for one hour prior to the procedure. 2017 1:30 PM  
  Appointment with [address] [place] [address] at [facility] ([phone]) Adult Patient: Patient should drink 32 to 48 ozs of water, clear liquids, tea or juice one hour prior to exam (no carbonated beverages or milk)  Do not void (urinate). Your bladder must be full for this scan to be successful. Once the exam is completed, you can void immediately. GENERAL INSTRUCTIONS 1. Bring any non [facility] facility films/reports pertaining to the area being studied with you on the day of appointment. 2. A written order with a valid diagnosis and Physicians signature is required for all scheduled tests. 3. Check in at registration 30 minutes before your appointment time unless you were instructed to do otherwise. Pediatric: [place] to 11years of age: -Encourage water,juice or formula the day of the exam. -If the child is toilet trained, TRY NOT to allow the child to void for one hour prior to the study, so that the bladder will be full. -Please bring a full bottle if your child is an infant, as it may be needed during the exam.  Pediatric: 5 years to 25years old -Finish drinking 28 to 50 ozs. of water, clear liquids,tea or juice one hour prior to the exam ( no carbonated beverages or milk) -Do not void for one hour prior to the procedure. Introducing [facility] & [facility] SERVICES! Dear Malcolm Burgos: Thank you for requesting a Hive guard unlimited account. Our records indicate that you already have an active H[employer] guard unlimited account. You can access your account anytime at [email or web address]/[employer] Did you know that you can access your hospital and ER discharge instructions at any time in Hive guard unlimited? You can also review all of your test results from your hospital stay or ER visit. Additional Information If you have questions, please visit the Frequently Asked Questions section of the [employer] unlimited website at [email or web address] Remember, H[employer] unlimited is NOT to be used for urgent needs. For medical emergencies, dial 911. Now available from your iPhone and Android! Please provide this summary of care documentation to your next provider. Your primary care clinician is listed as Zayra Rogel. If you have any questions after today's visit, please call [phone].

## 2017-03-30 NOTE — PROGRESS NOTES
Ailyn Sellers is a 40 y.o. female, who's new to our practice. 2nd visit with this physician    Anemia microcytic presumed of iron deficiency. Hgb 7.1. She is symptomatic of fatigue and fatigue on exertion. She not light headed. She have restarted iron supplements. Have recently seen her OBGYN, assuming GYN cause, she was started on birth control to manage her menorrhagia, had transvaginal US ordered and pending. Her GYN have sent her to Heme/ONc for iron transfusion. We'll get further labs for her anemia. Pending work up with her GYN. If no obvious GYN cause, will do further studies for GI cause of anemia. Hx of GERD. HTN: BP at goal      Reviewed: active problem list, medication list, allergies, notes from last encounter, lab results    Pertinent items are noted in HPI. Labs reviewed with patient:      No Known Allergies  Current Outpatient Prescriptions on File Prior to Visit   Medication Sig Dispense Refill    albuterol (PROVENTIL VENTOLIN) 2.5 mg /3 mL (0.083 %) nebulizer solution 3 mL by Nebulization route every four (4) hours as needed for Wheezing. 200 Each 5    albuterol (PROVENTIL HFA) 90 mcg/actuation inhaler Take 2 Puffs by inhalation every four (4) hours as needed for Wheezing (and cough). 1 Inhaler 1    butalbital-acetaminophen-caffeine (FIORICET, ESGIC) -40 mg per tablet Take 1 Tab by mouth every six (6) hours as needed for Pain. Max Daily Amount: 4 Tabs. Max 6 /24 hours 30 Tab 1     No current facility-administered medications on file prior to visit.       Patient Active Problem List   Diagnosis Code    HTN (hypertension) I10    Multiple sclerosis ([facility] [address] G35    Heavy menstrual period N92.0    Anemia D64.9    Obesity, Class III, BMI 40-49.9 (morbid obesity) (HCC) E66.01    Mild intermittent asthma without complication V64.89    Vitamin D deficiency E55.9    Iron deficiency anemia due to chronic blood loss D50.0    Migraine without aura and without status migrainosus, not intractable G43.009    Family history of diabetes mellitus Z83.3    H/O tubal ligation Z98.51    Gastroesophageal reflux disease without esophagitis K21.9       Visit Vitals    /77    Pulse 84    Temp 98.6 °F (37 °C) (Oral)    Ht 5' 6\" (1.676 m)    Wt 280 lb 9.6 oz (127.3 kg)    LMP 03/30/2017    BMI 45.29 kg/m2     General appearance: alert, cooperative, no distress, appears stated age  Head: Normocephalic, without obvious abnormality, atraumatic  Eyes: conjunctivae/corneas clear. PERRL, EOM's intact. Lungs: clear to auscultation bilaterally  Heart: regular rate and rhythm, S1, S2 normal, no murmur, click, rub or gallop  Extremities: extremities normal, atraumatic, no cyanosis or edema  Pulses: 2+ and symmetric  Neurologic: Grossly normal      Assessment/Plans:    Likely chronic anemia due to menorrhagia, no severe symptoms. Have appropriate work up and f/u. Will manage out patient. Iron deficiency anemia due to chronic blood loss  -     IRON PROFILE    Menorrhagia with regular cycle  -     IRON PROFILE    Family history of diabetes mellitus  -     HEMOGLOBIN A1C W/O EAG    Obesity, Class III, BMI 40-49.9 (morbid obesity) ([facility] [address]  -     LIPID PANEL  -     HEMOGLOBIN A1C W/O EAG    Encounter for immunization  -     TETANUS AND DIPHTHERIA TOXOIDS (TD) ADSORBED, PRES. FREE, IN INDIVIDS. >=7, IM    Discussed plans, risk/benefits of treatments/observations. Through the use of shared decision making, above plans were agreed upon. Medication compliance advised. Patient verbalized understanding.      Follow-up Disposition:  Return in about 5 months (around 8/30/2017) for htn, meds, labs, sooner if labs abnormal.      Lacie Balbuena MD  3/30/2017

## 2017-04-03 PROBLEM — N92.0 MENORRHAGIA WITH REGULAR CYCLE: Status: ACTIVE | Noted: 2017-04-03

## 2017-04-04 ENCOUNTER — HOSPITAL ENCOUNTER (OUTPATIENT)
Dept: ULTRASOUND IMAGING | Age: 42
Discharge: HOME | End: 2017-04-04
Attending: OBSTETRICS & GYNECOLOGY
Payer: COMMERCIAL

## 2017-04-04 ENCOUNTER — HOSPITAL ENCOUNTER (OUTPATIENT)
Dept: MAMMOGRAPHY | Age: 42
Discharge: HOME | End: 2017-04-04
Attending: OBSTETRICS & GYNECOLOGY
Payer: COMMERCIAL

## 2017-04-04 DIAGNOSIS — Z12.31 VISIT FOR SCREENING MAMMOGRAM: ICD-10-CM

## 2017-04-04 DIAGNOSIS — N92.0 MENORRHAGIA WITH REGULAR CYCLE: ICD-10-CM

## 2017-04-04 PROCEDURE — 77067 SCR MAMMO BI INCL CAD: CPT

## 2017-04-04 PROCEDURE — 76856 US EXAM PELVIC COMPLETE: CPT

## 2017-04-04 PROCEDURE — 76830 TRANSVAGINAL US NON-OB: CPT

## 2017-04-05 NOTE — PROGRESS NOTES
Ultrasound reviewed  Small fibroids  Recommend followup appt to review and plan for endometrial biopsy at that visit

## 2017-04-29 LAB
CHOLEST SERPL-MCNC: 182 MG/DL (ref 100–199)
HBA1C MFR BLD: 5.4 % (ref 4.8–5.6)
HDLC SERPL-MCNC: 50 MG/DL
IRON SATN MFR SERPL: 3 % (ref 15–55)
IRON SERPL-MCNC: 10 UG/DL (ref 27–159)
LDLC SERPL CALC-MCNC: 115 MG/DL (ref 0–99)
TIBC SERPL-MCNC: 372 UG/DL (ref 250–450)
TRIGL SERPL-MCNC: 83 MG/DL (ref 0–149)
UIBC SERPL-MCNC: 362 UG/DL (ref 131–425)
VLDLC SERPL CALC-MCNC: 17 MG/DL (ref 5–40)

## 2017-05-15 ENCOUNTER — OFFICE VISIT (OUTPATIENT)
Dept: OBGYN CLINIC | Age: 42
End: 2017-05-15

## 2017-05-15 ENCOUNTER — HOSPITAL ENCOUNTER (OUTPATIENT)
Dept: LAB | Age: 42
Discharge: HOME | End: 2017-05-15

## 2017-05-15 VITALS
TEMPERATURE: 98.9 F | HEIGHT: 66 IN | HEART RATE: 92 BPM | BODY MASS INDEX: 46.16 KG/M2 | SYSTOLIC BLOOD PRESSURE: 119 MMHG | WEIGHT: 287.2 LBS | DIASTOLIC BLOOD PRESSURE: 75 MMHG | RESPIRATION RATE: 18 BRPM

## 2017-05-15 DIAGNOSIS — D25.1 INTRAMURAL LEIOMYOMA OF UTERUS: ICD-10-CM

## 2017-05-15 DIAGNOSIS — N93.9 ABNORMAL UTERINE BLEEDING (AUB): ICD-10-CM

## 2017-05-15 DIAGNOSIS — R93.89 THICKENED ENDOMETRIUM: Primary | ICD-10-CM

## 2017-05-15 NOTE — PROGRESS NOTES
Chief Complaint   Patient presents with    Follow-up     U/S    Endometrial Biopsy     Patient denies complaints other than spotting which she states is due to birth control pills. Two patient identifiers verified.       [facility]  OFFICE PROCEDURE PROGRESS NOTE        Chart reviewed for the following:   Young GARNER, have reviewed the History, Physical and updated the Allergic reactions for Karen D 25 [address] [place] performed immediately prior to start of procedure:   Young GARNER, have performed the following reviews on [address] [place] [address] prior to the start of the procedure:            * Patient was identified by name and date of birth   * Agreement on procedure being performed was verified  * Risks and Benefits explained to the patient  * Procedure site verified and marked as necessary  * Patient was positioned for comfort  * Consent was signed and verified     Time: 3:30 pm      Date of procedure: 5/15/2017    Procedure performed by:  Aye Crocker MD    Provider assisted by: Chaim Luna RN    Patient assisted by: self    How tolerated by patient: tolerated the procedure well with no complications    Post Procedural Pain Scale: 0 - No Hurt    Comments: none

## 2017-05-15 NOTE — PATIENT INSTRUCTIONS
Endometrial Biopsy: About This Test  What is it? An endometrial biopsy is a way for your doctor to take a small sample of the lining of the uterus (endometrium). The sample is looked at under a microscope for abnormal cells. An endometrial biopsy helps your doctor find problems in the endometrium. Why is this test done? An endometrial biopsy is done to check for cancer of the uterus. The test is also done if you have abnormal bleeding from your uterus or are having problems getting pregnant. The test results show how your body's hormones are affecting the lining of the uterus. How can you prepare for the test?  Talk to your doctor about all your health conditions before the test. For example, tell your doctor if you:  · Are or might be pregnant. An endometrial biopsy is not done during pregnancy. · Are taking any medicines. · Are allergic to any medicines. · Have had bleeding problems, or if you take aspirin or some other blood thinner. · Have been treated for an infection in your pelvic area. · Have any heart or lung problems. Other ways to prepare:  · Do not douche, use tampons, or use vaginal medicines for 24 hours before the test.  · Ask your doctor if you should take a pain reliever, such as ibuprofen (Advil or Motrin), 30 to 60 minutes before the test. This can help reduce any cramping pain that the test can cause. · Talk to your doctor if you have concerns about the need for the test, its risks, how it will be done, or what the results may mean. What happens before the test?  · You will empty your bladder just before the test.  · You will be asked to sign a consent form that says you understand the risks of the test and agree to have it done. What happens during the test?  · You will lie on an exam table. Your feet will be in stirrups. · The doctor may use a spray or injection to numb your cervix. The cervix is the opening to the uterus.   · The doctor will use a tool called a speculum to see the cervix. · Then the doctor will pass a thin tube through the cervix to take a sample of the uterus lining. You may feel a sharp cramp when the doctor collects the sample. · The sample is sent to a lab. How long does the test take? The test will take about 5 to 15 minutes. What happens after the test?  · You will probably be able to go home right away. · You likely will have mild vaginal bleeding and may have cramps for a few days after the test. The cramps may feel like bad menstrual cramps. · Ask your doctor if you can take an over-the-counter pain medicine, such as acetaminophen (Tylenol), ibuprofen (Advil, Motrin), or naproxen (Aleve). Be safe with medicines. Read and follow all instructions on the label. · Do not have sex, use tampons, or douche until the spotting stops. Use pads for vaginal bleeding or discharge. · Do not do strenuous exercise or heavy lifting for one day after your biopsy. Follow-up care is a key part of your treatment and safety. Be sure to make and go to all appointments, and call your doctor if you are having problems. It's also a good idea to keep a list of the medicines you take. Ask your doctor when you can expect to have your test results. Where can you learn more? Go to http://shani-mark.info/. Enter [phone] in the search box to learn more about \"Endometrial Biopsy: About This Test.\"  Current as of: October 13, 2016  Content Version: 11.2  © 4461-8413 Healthwise, Incorporated. Care instructions adapted under license by [employer] (which disclaims liability or warranty for this information). If you have questions about a medical condition or this instruction, always ask your healthcare professional. [address] any warranty or liability for your use of this information.

## 2017-05-15 NOTE — MR AVS SNAPSHOT
Visit Information Date & Time Provider Department Dept. Phone Encounter #  
 5/15/2017  3:00 PM Dex [address] [phone] [id number] Your Appointments 5/19/2017  2:00 PM  
New Patient with Chad Kingston MD  
[address] Oncology at [facility]) Appt Note: new [facility] iron infusion for anemia [address] [facility] [address] [address]  
[phone]  
  
   
 [address]  
  
    
 6/23/2017  9:30 AM  
3 MONTH with Melba Scherer MD  
[facility] ([facility]) Appt Note: 3 month u/s follow up [facility] [address] [facility] [address] [facility]  
  
    
 9/1/2017  3:30 PM  
ROUTINE CARE with Chito Oliver MD  
[facility]) Appt Note: annual exam; 5m fu  htn  meds  labs [address] [address]  
[facility] Upcoming Health Maintenance Date Due INFLUENZA AGE 9 TO ADULT 8/1/2017 PAP AKA CERVICAL CYTOLOGY 3/21/2020 Allergies as of 5/15/2017  Review Complete On: 5/15/2017 By: Micky Gay No Known Allergies Current Immunizations  Never Reviewed Name Date Influenza Vaccine Intradermal PF 10/9/2015 Td, Adsorbed PF 3/30/2017 Not reviewed this visit Vitals BP Pulse Temp Resp Height(growth percentile) Weight(growth percentile) 119/75 (BP 1 Location: Right arm, BP Patient Position: Sitting) 92 98.9 °F (37.2 °C) (Oral) 18 5' 6\" (1.676 m) 287 lb 3.2 oz (130.3 kg) LMP BMI OB Status Smoking Status 05/01/2017 (Exact Date) 46.36 kg/m2 Having regular periods Never Smoker Vitals History BMI and BSA Data Body Mass Index Body Surface Area 46.36 kg/m 2 2.46 m 2 Preferred Pharmacy Pharmacy Name Phone [facility] AT [address] [facility] AT [address]  [phone] Your Updated Medication List  
  
   
This list is accurate as of: 5/15/17  3:34 PM.  Always use your most recent med list.  
  
  
  
  
 Allan Sosatock 250-50 mcg/dose diskus inhaler Generic drug:  fluticasone-salmeterol Take 1 Puff by inhalation every twelve (12) hours. * albuterol 90 mcg/actuation inhaler Commonly known as:  PROVENTIL HFA Take 2 Puffs by inhalation every four (4) hours as needed for Wheezing (and cough). * albuterol 2.5 mg /3 mL (0.083 %) nebulizer solution Commonly known as:  PROVENTIL VENTOLIN  
3 mL by Nebulization route every four (4) hours as needed for Wheezing. AUBAGIO 14 mg Tab Generic drug:  teriflunomide  
  
 butalbital-acetaminophen-caffeine -40 mg per tablet Commonly known as:  Murvin Darshana Take 1 Tab by mouth every six (6) hours as needed for Pain. Max Daily Amount: 4 Tabs. Max 6 /24 hours  
  
 cetirizine 10 mg tablet Commonly known as:  ZYRTEC TK 1 T PO ONCE DAILY  
  
 ergocalciferol 50,000 unit capsule Commonly known as:  ERGOCALCIFEROL Take 1 Cap by mouth every seven (7) days. hydroCHLOROthiazide 25 mg tablet Commonly known as:  HYDRODIURIL Take 1 Tab by mouth daily. losartan 50 mg tablet Commonly known as:  COZAAR Take 1 Tab by mouth daily. norethindrone 0.35 mg Tab Commonly known as:  Manjeet & Manjeet Take 1 Tab by mouth daily. omeprazole 40 mg capsule Commonly known as:  PRILOSEC Take 1 Cap by mouth daily. * Notice: This list has 2 medication(s) that are the same as other medications prescribed for you. Read the directions carefully, and ask your doctor or other care provider to review them with you. Patient Instructions Endometrial Biopsy: About This Test 
 What is it? An endometrial biopsy is a way for your doctor to take a small sample of the lining of the uterus (endometrium). The sample is looked at under a microscope for abnormal cells. An endometrial biopsy helps your doctor find problems in the endometrium. Why is this test done? An endometrial biopsy is done to check for cancer of the uterus. The test is also done if you have abnormal bleeding from your uterus or are having problems getting pregnant. The test results show how your body's hormones are affecting the lining of the uterus. How can you prepare for the test? 
Talk to your doctor about all your health conditions before the test. For example, tell your doctor if you: · Are or might be pregnant. An endometrial biopsy is not done during pregnancy. · Are taking any medicines. · Are allergic to any medicines. · Have had bleeding problems, or if you take aspirin or some other blood thinner. · Have been treated for an infection in your pelvic area. · Have any heart or lung problems. Other ways to prepare: · Do not douche, use tampons, or use vaginal medicines for 24 hours before the test. 
· Ask your doctor if you should take a pain reliever, such as ibuprofen (Advil or Motrin), 30 to 60 minutes before the test. This can help reduce any cramping pain that the test can cause. · Talk to your doctor if you have concerns about the need for the test, its risks, how it will be done, or what the results may mean. What happens before the test? 
· You will empty your bladder just before the test. 
· You will be asked to sign a consent form that says you understand the risks of the test and agree to have it done. What happens during the test? 
· You will lie on an exam table. Your feet will be in stirrups. · The doctor may use a spray or injection to numb your cervix. The cervix is the opening to the uterus. · The doctor will use a tool called a speculum to see the cervix. · Then the doctor will pass a thin tube through the cervix to take a sample of the uterus lining. You may feel a sharp cramp when the doctor collects the sample. · The sample is sent to a lab. How long does the test take? The test will take about 5 to 15 minutes. What happens after the test? 
· You will probably be able to go home right away. · You likely will have mild vaginal bleeding and may have cramps for a few days after the test. The cramps may feel like bad menstrual cramps. · Ask your doctor if you can take an over-the-counter pain medicine, such as acetaminophen (Tylenol), ibuprofen (Advil, Motrin), or naproxen (Aleve). Be safe with medicines. Read and follow all instructions on the label. · Do not have sex, use tampons, or douche until the spotting stops. Use pads for vaginal bleeding or discharge. · Do not do strenuous exercise or heavy lifting for one day after your biopsy. Follow-up care is a key part of your treatment and safety. Be sure to make and go to all appointments, and call your doctor if you are having problems. It's also a good idea to keep a list of the medicines you take. Ask your doctor when you can expect to have your test results. Where can you learn more? Go to http://shani-mark.info/. Enter [phone] in the search box to learn more about \"Endometrial Biopsy: About This Test.\" Current as of: October 13, 2016 Content Version: 11.2 © 7056-1343 ServiceNow, Incorporated. Care instructions adapted under license by [employer] (which disclaims liability or warranty for this information). If you have questions about a medical condition or this instruction, always ask your healthcare professional. [address] any warranty or liability for your use of this information. Introducing [facility]! Dear Roann Kawasaki: Thank you for requesting a [employer] [place] account.   Our records indicate that you already have an active MenuSpring account. You can access your account anytime at [email or web address] DICOM Grid/[email or web address] Employment Did you know that you can access your hospital and ER discharge instructions at any time in MenuSpring? You can also review all of your test results from your hospital stay or ER visit. Additional Information If you have questions, please visit the Frequently Asked Questions section of the MenuSpring website at [email or web address] D[employer]OM Grid/[email or web address] Employment/. Remember, MenuSpring is NOT to be used for urgent needs. For medical emergencies, dial 911. Now available from your iPhone and Android! Please provide this summary of care documentation to your next provider. Your primary care clinician is listed as Kassy Vu. If you have any questions after today's visit, please call [phone].

## 2017-05-16 NOTE — PROGRESS NOTES
FOLLOW UP VISIT    SUBJECTIVE: Jasbir Enciso is a 40 y.o. female who presents to follow up from AUB visit. Currently on OCPs. Some improvement . Patient's last menstrual period was 05/01/2017 (exact date). .    ROS: Pertinent items are noted in HPI. OBJECTIVE:     Visit Vitals    /75 (BP 1 Location: Right arm, BP Patient Position: Sitting)    Pulse 92    Temp 98.9 °F (37.2 °C) (Oral)    Resp 18    Ht 5' 6\" (1.676 m)    Wt 287 lb 3.2 oz (130.3 kg)    LMP 05/01/2017 (Exact Date)    BMI 46.36 kg/m2       General:  alert, cooperative, no distress, appears stated age   Skin:  Normal.   Extremities:  extremities normal, atraumatic, no cyanosis or edema   Neurologic:  negative   Psychiatric:  non focal     INDICATION: Menorrhagia. Anemia.      PELVIC SONOGRAM is performed through the unremarkable urinary bladder.     The uterus is large with no fibroids apparent by this technique. Uterus measures  approximately 12 x 8 x 5 cm. Endometrial cavity is contains some echogenic  nonspecific material which might represent blood, as well as a trace amount of  simple fluid. The endometrial stripe including the endometrial contents measures  10 mm.     Ovaries are obscured by bowel gas. There is no adnexal mass. There is no  significant free fluid in the pelvic cul-de-sac.     TRANSVAGINAL SONOGRAM is performed following Pelvic Sonogram in order to more  accurately measure the endometrial thickness and to more adequately delineate  ovarian anatomy.     The uterus contains 2 intramural fibroids measuring 3.7 and 3.3 cm. Endometrial  stripe is difficult to measure separately from the hyperechoic material which  appears to be in the cavity; this material could represent blood clot but  hyperplasia or neoplasm are not excluded.      Ovaries remain obscured by bowel gas. No adnexal masses seen     IMPRESSION  IMPRESSION: Difficult to exclude a thickened endometrial stripe as discussed.   Unremarkable adnexa. ENDOMETRIAL BIOPSY procedure note:  Indication: thick endo, aub    Speculum placed into the vagina. Cervix cleaned twice with betadine. Single tooth tenaculum placed on the anterior lip of the cervix. Endometrial pipelle placed through the cervical canal twice and endometrial biopsy sent to pathology. Pt tolerated the procedure well. ASSESSMENT:      ICD-10-CM ICD-9-CM    1. Thickened endometrium R93.8 793.5 SURGICAL PATHOLOGY   2. Abnormal uterine bleeding (AUB) N93.9 626.9    3. Intramural leiomyoma of uterus D25.1 218.1           Follow-up Disposition:  Return in about 6 months (around 11/15/2017) for follow up as needed. Today's care was reviewed and discussed with the patient. She expresses understanding and approval of today's plan.     Melba Scherer MD

## 2017-05-19 ENCOUNTER — OFFICE VISIT (OUTPATIENT)
Dept: ONCOLOGY | Age: 42
End: 2017-05-19

## 2017-05-19 VITALS
TEMPERATURE: 99.1 F | SYSTOLIC BLOOD PRESSURE: 125 MMHG | DIASTOLIC BLOOD PRESSURE: 84 MMHG | BODY MASS INDEX: 45.68 KG/M2 | WEIGHT: 283 LBS | HEART RATE: 82 BPM | OXYGEN SATURATION: 94 %

## 2017-05-19 DIAGNOSIS — D50.0 IRON DEFICIENCY ANEMIA DUE TO CHRONIC BLOOD LOSS: Primary | ICD-10-CM

## 2017-05-19 NOTE — PROGRESS NOTES
Hematology Consultation        Patient: Karolyn Hamilton MRN: [id number]  SSN: [id number]    YOB: 1975  Age: 40 y.o. Sex: female      Subjective:      Karolyn Hamilton is a 40 y.o. female who I am seeing on consultation for iron deficiency anemia. She suffers with menorrhagia. She was seen by Dr. Libertad Medrano. She does not like taking oral iron supplements. She has fatigue.         Review of Systems:    Constitutional: negative  Eyes: negative  Ears, Nose, Mouth, Throat, and Face: negative  Respiratory: negative  Cardiovascular: negative  Gastrointestinal: negative  Genitourinary:negative  Integument/Breast: negative  Hematologic/Lymphatic: negative  Musculoskeletal:negative  Neurological: negative      Past Medical History:   Diagnosis Date    Anemia     Family history of diabetes mellitus 3/16/2017    Gastroesophageal reflux disease without esophagitis 3/16/2017    H/O tubal ligation 3/16/2017    HTN (hypertension) 8/10/2011    Hypertension     Iron deficiency anemia due to chronic blood loss 3/16/2017    Iron deficiency anemia due to chronic blood loss 3/16/2017    Iron deficiency anemia due to chronic blood loss 3/16/2017    Menorrhagia with regular cycle 4/3/2017    Migraine without aura and without status migrainosus, not intractable 3/16/2017    Migraine without aura and without status migrainosus, not intractable 3/16/2017    Mild intermittent asthma without complication 1987    Mild intermittent asthma without complication     MS (multiple sclerosis) ([facility] 10/08    using clinical trial medications    Obesity, Class III, BMI 40-49.9 (morbid obesity) ([facility] 12/3/2015    Vitamin D deficiency 3/16/2017    Vitamin D deficiency 3/16/2017     Past Surgical History:   Procedure Laterality Date    HX  SECTION      x3; , 2001, & 2005    LAP,TUBAL CAUTERY        Family History   Problem Relation Age of Onset    Diabetes Mother     Hypertension Mother      Social History   Substance Use Topics    Smoking status: Never Smoker    Smokeless tobacco: Never Used    Alcohol use 1.0 oz/week     1 Glasses of wine, 1 Shots of liquor per week      Comment: socially      Prior to Admission medications    Medication Sig Start Date End Date Taking? Authorizing Provider   norethindrone (MICRONOR) 0.35 mg tab Take 1 Tab by mouth daily. 3/21/17  Yes Momo Zhang MD   omeprazole (PRILOSEC) 40 mg capsule Take 1 Cap by mouth daily. 3/16/17  Yes Konstantin Britt MD   ergocalciferol (ERGOCALCIFEROL) 50,000 unit capsule Take 1 Cap by mouth every seven (7) days. 3/16/17  Yes Konstantin Britt MD   hydroCHLOROthiazide (HYDRODIURIL) 25 mg tablet Take 1 Tab by mouth daily. 3/16/17  Yes Konstantin Britt MD   losartan (COZAAR) 50 mg tablet Take 1 Tab by mouth daily. 3/16/17  Yes Konstantin Britt MD   fluticasone-salmeterol (ADVAIR DISKUS) 250-50 mcg/dose diskus inhaler Take 1 Puff by inhalation every twelve (12) hours. Yes Historical Provider   albuterol (PROVENTIL VENTOLIN) 2.5 mg /3 mL (0.083 %) nebulizer solution 3 mL by Nebulization route every four (4) hours as needed for Wheezing. 6/24/15  Yes Ginette Mehta MD   albuterol (PROVENTIL HFA) 90 mcg/actuation inhaler Take 2 Puffs by inhalation every four (4) hours as needed for Wheezing (and cough). 5/30/15  Yes PAUL Wetzel   AUBAGIO 14 mg tab  3/7/17   Historical Provider   cetirizine (ZYRTEC) 10 mg tablet TK 1 T PO ONCE DAILY 12/8/16   Historical Provider   butalbital-acetaminophen-caffeine (FIORICET, ESGIC) -40 mg per tablet Take 1 Tab by mouth every six (6) hours as needed for Pain. Max Daily Amount: 4 Tabs.  Max 6 /24 hours 7/30/15   Ginette Mehta MD              No Known Allergies        Objective:     Vitals:    05/19/17 1354   BP: 125/84   Pulse: 82   Temp: 99.1 °F (37.3 °C)   SpO2: 94%   Weight: 283 lb (128.4 kg)            Physical Exam:    GENERAL: alert, cooperative  EYE: negative  LYMPHATIC: Cervical, supraclavicular, and axillary nodes normal.   THROAT & NECK: normal and no erythema or exudates noted. LUNG: clear to auscultation bilaterally  HEART: regular rate and rhythm  ABDOMEN: soft, non-tender  EXTREMITIES:  no edema  SKIN: Normal.  NEUROLOGIC: negative        Lab Results   Component Value Date/Time    WBC 6.9 03/16/2017 03:51 PM    Hemoglobin (POC) 10.0 07/30/2015 04:03 PM    HGB 7.2 03/16/2017 03:51 PM    HCT 26.3 03/16/2017 03:51 PM    PLATELET 302 42/37/3281 03:51 PM    MCV 63 03/16/2017 03:51 PM       Lab Results   Component Value Date/Time    Iron 10 04/28/2017 08:06 AM    TIBC 372 04/28/2017 08:06 AM    Iron % saturation 3 04/28/2017 08:06 AM             Assessment:     1. Iron deficiency anemia secondary to menorrhagia:    I discussed with her various ways to replace/supplement iron which includes giving oral iron preparation such as iron sulfate or similar products or utilizing intravenous access to administer the total dose of iron. The patient was given the option to choose from various oral and intravenous iron preparation and after a prolonged discussion we have agreed to proceed with IV Iron to be administered in the infusion center. I counseled the patient regarding the side effects of IV iron infusion which includes rare instances of anaphylactic reactions etc.  After weighing the benefit and risks, the patient agreed to proceed with the treatment. Plan:       1. IV iron in [facility]  2. Serum ferritin and CBC in 3 and 6 months  3. Return in 6 months        Signed by: Dionte Denson MD                     May 19, 2017         CC.  MD Nena Simms MD

## 2017-05-19 NOTE — PROGRESS NOTES
Pt is a 40 yr old Pt here to discuss Iron Def Anemai. Pt reports this is a chronic problem. She does report some dizziness and short of breath today. Pt also reports a history of MS.

## 2017-05-24 RX ORDER — SODIUM CHLORIDE 9 MG/ML
25 INJECTION, SOLUTION INTRAVENOUS CONTINUOUS
Status: DISCONTINUED | OUTPATIENT
Start: 2017-05-30 | End: 2017-06-03 | Stop reason: HOSPADM

## 2017-05-30 ENCOUNTER — HOSPITAL ENCOUNTER (OUTPATIENT)
Dept: INFUSION THERAPY | Age: 42
Discharge: HOME | End: 2017-05-30
Payer: COMMERCIAL

## 2017-05-30 VITALS
DIASTOLIC BLOOD PRESSURE: 64 MMHG | SYSTOLIC BLOOD PRESSURE: 115 MMHG | OXYGEN SATURATION: 98 % | TEMPERATURE: 98.7 F | RESPIRATION RATE: 18 BRPM | HEART RATE: 72 BPM

## 2017-05-30 DIAGNOSIS — I10 ESSENTIAL HYPERTENSION: ICD-10-CM

## 2017-05-30 DIAGNOSIS — K21.9 GASTROESOPHAGEAL REFLUX DISEASE WITHOUT ESOPHAGITIS: ICD-10-CM

## 2017-05-30 LAB — FERRITIN SERPL-MCNC: 5 NG/ML (ref 8–252)

## 2017-05-30 PROCEDURE — 74011000258 HC RX REV CODE- 258: Performed by: INTERNAL MEDICINE

## 2017-05-30 PROCEDURE — 96374 THER/PROPH/DIAG INJ IV PUSH: CPT

## 2017-05-30 PROCEDURE — 82728 ASSAY OF FERRITIN: CPT | Performed by: INTERNAL MEDICINE

## 2017-05-30 PROCEDURE — 36415 COLL VENOUS BLD VENIPUNCTURE: CPT | Performed by: INTERNAL MEDICINE

## 2017-05-30 PROCEDURE — 74011250636 HC RX REV CODE- 250/636: Performed by: INTERNAL MEDICINE

## 2017-05-30 RX ORDER — OMEPRAZOLE 40 MG/1
40 CAPSULE, DELAYED RELEASE ORAL DAILY
Qty: 90 CAP | Refills: 0 | Status: SHIPPED | OUTPATIENT
Start: 2017-05-30 | End: 2017-06-02 | Stop reason: SDUPTHER

## 2017-05-30 RX ORDER — SODIUM CHLORIDE 0.9 % (FLUSH) 0.9 %
10-40 SYRINGE (ML) INJECTION AS NEEDED
Status: ACTIVE | OUTPATIENT
Start: 2017-05-30 | End: 2017-05-31

## 2017-05-30 RX ORDER — HYDROCHLOROTHIAZIDE 25 MG/1
25 TABLET ORAL DAILY
Qty: 90 TAB | Refills: 0 | Status: SHIPPED | OUTPATIENT
Start: 2017-05-30 | End: 2017-06-02 | Stop reason: SDUPTHER

## 2017-05-30 RX ORDER — LOSARTAN POTASSIUM 50 MG/1
50 TABLET ORAL DAILY
Qty: 90 TAB | Refills: 0 | Status: SHIPPED | OUTPATIENT
Start: 2017-05-30 | End: 2017-06-02 | Stop reason: SDUPTHER

## 2017-05-30 RX ADMIN — Medication 10 ML: at 14:18

## 2017-05-30 RX ADMIN — SODIUM CHLORIDE 25 ML/HR: 900 INJECTION, SOLUTION INTRAVENOUS at 14:52

## 2017-05-30 RX ADMIN — FERUMOXYTOL 510 MG: 510 INJECTION INTRAVENOUS at 14:52

## 2017-05-30 NOTE — TELEPHONE ENCOUNTER
From: Allen Mujica  To: Guerline Boyce MD  Sent: 5/30/2017 1:57 PM EDT  Subject: Medication Renewal Request    Original authorizing provider: MD Allen Aldridge would like a refill of the following medications:  omeprazole (PRILOSEC) 40 mg capsule [Tamie Babin MD]  hydroCHLOROthiazide (HYDRODIURIL) 25 mg tablet [Tamie Babin MD]  losartan (COZAAR) 50 mg tablet Guerline Boyce MD]    Preferred pharmacy: [facility] AT [address] [place] [address] DR AT [address] [place] [address]    Comment:  Please process as a 3 month supply per my insurance company.

## 2017-05-30 NOTE — PROGRESS NOTES
Problem: Patient Education: Go to Education Activity  Goal: Patient/Family Education  Outcome: Progressing Towards Goal  Feraheme medication handout reviewed and given to patient. Pt acknowledges understanding of signs of an allergic reaction as well as possible side effects.

## 2017-05-30 NOTE — PROGRESS NOTES
Pt arrived to [address] [facility]. ambulatory in no acute distress at 1503 for Feraheme D1.  Assessment unremarkable pt voiced no complaints or concerns. PIV established in Right AC site WNL positive blood return noted.      Patient Vitals for the past 12 hrs:   Temp Pulse Resp BP SpO2   05/30/17 1543 - 72 - 115/64 -   05/30/17 1409 98.7 °F (37.1 °C) 79 18 104/68 98 %       Labs obtained: Ferritin Level sent    The following medications administered:  NS KVO  Feraheme 510 mg IVP over 15 min    Pt tolerated treatment well. No adverse reactions noted  IV flushed per policy and removed, 2x2 and coban placed.  Pt discharged ambulatory in no acute distress at 1640, accompanied by self. Next appointment 6/6/17.

## 2017-05-30 NOTE — DISCHARGE INSTRUCTIONS
OUTPATIENT INFUSION CENTER    DISCHARGE INSTRUCTIONS FOR:    IRON INFUSIONS - INCLUDING VENOFER, FERRLECIT, AND INFED    You should continue to take your usual home medications unless otherwise instructed by your physician. Drink plenty of fluids and eat your usual diet. All medications have the potential to cause side effects. Your physician can instruct you regarding any necessary treatment for side effects. Some possible side effects of iron infusions may include the following:     - Urinary changes;   - Mild muscle cramping;   - Mild nausea, stomach pain, diarrhea or constipation;   - Mild skin itching, mild pain at IV site. Signs/Symptoms of an allergic reaction may require immediate medical attention. These may include one or more of the following:       Skin redness, itching, swelling, blistering, weeping, crusting, rash or hives. Wheezing, chest tightness, cough, or shortness of breath;   Swelling of the face, eyelids, lips, tongue, or throat;  Severe headache, seizures or tremor;  Stuffy nose, runny nose, sneezing;   Red (bloodshot), itchy, swollen, or watery eyes;  Stomach  pain, nausea, vomiting, diarrhea or bloody diarrhea; Chest pain or tightness, increased heart rate, palpitations, changes in blood pressure which can cause dizziness, unusual feelings of weakness or fatigue;  Back ache or pain around waist;  Painful urination, increase or decrease in amount of urine, blood in urine. Contact your physicians office with any questions or concerns regarding your treatment.     Edgar Tobias, Signature: _____________________________________ 5/30/2017  Derrick Cramer RN

## 2017-06-02 DIAGNOSIS — K21.9 GASTROESOPHAGEAL REFLUX DISEASE WITHOUT ESOPHAGITIS: ICD-10-CM

## 2017-06-02 DIAGNOSIS — I10 ESSENTIAL HYPERTENSION: ICD-10-CM

## 2017-06-02 RX ORDER — HYDROCHLOROTHIAZIDE 25 MG/1
25 TABLET ORAL DAILY
Qty: 90 TAB | Refills: 0 | Status: SHIPPED | OUTPATIENT
Start: 2017-06-02 | End: 2017-11-27 | Stop reason: SDUPTHER

## 2017-06-02 RX ORDER — OMEPRAZOLE 40 MG/1
40 CAPSULE, DELAYED RELEASE ORAL DAILY
Qty: 90 CAP | Refills: 0 | Status: SHIPPED | OUTPATIENT
Start: 2017-06-02 | End: 2018-03-07 | Stop reason: SDUPTHER

## 2017-06-02 RX ORDER — LOSARTAN POTASSIUM 50 MG/1
50 TABLET ORAL DAILY
Qty: 90 TAB | Refills: 0 | Status: SHIPPED | OUTPATIENT
Start: 2017-06-02 | End: 2017-11-29 | Stop reason: SDUPTHER

## 2017-06-05 ENCOUNTER — PATIENT MESSAGE (OUTPATIENT)
Dept: FAMILY MEDICINE CLINIC | Age: 42
End: 2017-06-05

## 2017-06-06 ENCOUNTER — APPOINTMENT (OUTPATIENT)
Dept: INFUSION THERAPY | Age: 42
End: 2017-06-06
Payer: COMMERCIAL

## 2017-06-06 RX ORDER — FLUTICASONE PROPIONATE AND SALMETEROL 250; 50 UG/1; UG/1
1 POWDER RESPIRATORY (INHALATION) EVERY 12 HOURS
Qty: 1 EACH | Refills: 0 | Status: SHIPPED | OUTPATIENT
Start: 2017-06-06 | End: 2018-02-26 | Stop reason: SDUPTHER

## 2017-06-09 ENCOUNTER — HOSPITAL ENCOUNTER (OUTPATIENT)
Dept: INFUSION THERAPY | Age: 42
Discharge: HOME | End: 2017-06-09
Payer: COMMERCIAL

## 2017-06-09 VITALS
TEMPERATURE: 99.1 F | DIASTOLIC BLOOD PRESSURE: 71 MMHG | RESPIRATION RATE: 18 BRPM | HEART RATE: 75 BPM | SYSTOLIC BLOOD PRESSURE: 105 MMHG

## 2017-06-09 PROCEDURE — 74011000258 HC RX REV CODE- 258: Performed by: INTERNAL MEDICINE

## 2017-06-09 PROCEDURE — 96365 THER/PROPH/DIAG IV INF INIT: CPT

## 2017-06-09 PROCEDURE — 74011250636 HC RX REV CODE- 250/636: Performed by: INTERNAL MEDICINE

## 2017-06-09 RX ORDER — SODIUM CHLORIDE 9 MG/ML
25 INJECTION, SOLUTION INTRAVENOUS AS NEEDED
Status: DISPENSED | OUTPATIENT
Start: 2017-06-09 | End: 2017-06-10

## 2017-06-09 RX ORDER — SODIUM CHLORIDE 0.9 % (FLUSH) 0.9 %
10-40 SYRINGE (ML) INJECTION AS NEEDED
Status: ACTIVE | OUTPATIENT
Start: 2017-06-09 | End: 2017-06-10

## 2017-06-09 RX ADMIN — Medication 10 ML: at 15:15

## 2017-06-09 RX ADMIN — Medication 10 ML: at 16:11

## 2017-06-09 RX ADMIN — SODIUM CHLORIDE 25 ML/HR: 900 INJECTION, SOLUTION INTRAVENOUS at 15:16

## 2017-06-09 RX ADMIN — FERUMOXYTOL 510 MG: 510 INJECTION INTRAVENOUS at 15:25

## 2017-06-09 NOTE — PROGRESS NOTES
Outpatient Infusion Center Progress Note    5251- Pt admit to [facility] for Feraheme 2/2 ambulatory in stable condition. Assessment completed. No new concerns voiced. PIV established in L A/C with positive blood return noted. Patient Vitals for the past 4 hrs:   Temp Pulse Resp BP   06/09/17 1609 - 75 - 105/71   06/09/17 1509 99.1 °F (37.3 °C) 73 18 106/72         Medications:  NS KVO  Feraheme 510 mg IV    1615- Pt monitored for 30 mins post infusion. Pt tolerated treatment well, no s/s of adverse reaction noted. PIV flushed and discontinued. D/c home ambulatory in no distress. No further appt's scheduled at this time.

## 2017-11-17 ENCOUNTER — OFFICE VISIT (OUTPATIENT)
Dept: ONCOLOGY | Age: 42
End: 2017-11-17

## 2017-11-17 VITALS
SYSTOLIC BLOOD PRESSURE: 118 MMHG | RESPIRATION RATE: 16 BRPM | HEIGHT: 66 IN | TEMPERATURE: 98.7 F | HEART RATE: 73 BPM | BODY MASS INDEX: 47.09 KG/M2 | OXYGEN SATURATION: 95 % | WEIGHT: 293 LBS | DIASTOLIC BLOOD PRESSURE: 80 MMHG

## 2017-11-17 DIAGNOSIS — D50.0 IRON DEFICIENCY ANEMIA DUE TO CHRONIC BLOOD LOSS: Primary | ICD-10-CM

## 2017-11-17 NOTE — PROGRESS NOTES
Dinah Jack is a 40 y.o. female here today for MAURILIO D/T Menorrhagia f/u. No repeat labs. IV iron on 5/30 & 6/9. VS stable. Patient denies pain. Patient denies N/V/D and constipation.

## 2017-11-17 NOTE — PROGRESS NOTES
Hematology Progress Note        Patient: Asim Davies MRN: [id number]  SSN: [id number]    YOB: 1975  Age: 40 y.o. Sex: female      Subjective:      Asim Davies is a 40 y.o. female who I am seeing in follow up for iron deficiency anemia. She suffers with menorrhagia. She was seen by Dr. Lili Royal. She does not like taking oral iron supplements. She received iron in 2017 and has not had repeat labs since that time. She denies any new symptoms.     Review of Systems:    Constitutional: negative  Eyes: negative  Ears, Nose, Mouth, Throat, and Face: negative  Respiratory: negative  Cardiovascular: negative  Gastrointestinal: negative  Genitourinary:negative  Integument/Breast: negative  Hematologic/Lymphatic: negative  Musculoskeletal:negative  Neurological: negative      Past Medical History:   Diagnosis Date    Anemia     Family history of diabetes mellitus 3/16/2017    Gastroesophageal reflux disease without esophagitis 3/16/2017    H/O tubal ligation 3/16/2017    HTN (hypertension) 8/10/2011    Hypertension     Iron deficiency anemia due to chronic blood loss 3/16/2017    Iron deficiency anemia due to chronic blood loss 3/16/2017    Iron deficiency anemia due to chronic blood loss 3/16/2017    Menorrhagia with regular cycle 4/3/2017    Migraine without aura and without status migrainosus, not intractable 3/16/2017    Migraine without aura and without status migrainosus, not intractable 3/16/2017    Mild intermittent asthma without complication 2335    Mild intermittent asthma without complication     MS (multiple sclerosis) ([facility] 10/08    using clinical trial medications    Obesity, Class III, BMI 40-49.9 (morbid obesity) ([facility] 12/3/2015    Vitamin D deficiency 3/16/2017    Vitamin D deficiency 3/16/2017     Past Surgical History:   Procedure Laterality Date    HX  SECTION      x3; , , &     LAP,TUBAL CAUTERY   Family History   Problem Relation Age of Onset    Diabetes Mother     Hypertension Mother      Social History   Substance Use Topics    Smoking status: Never Smoker    Smokeless tobacco: Never Used    Alcohol use 1.0 oz/week     1 Glasses of wine, 1 Shots of liquor per week      Comment: socially      Prior to Admission medications    Medication Sig Start Date End Date Taking? Authorizing Provider   fluticasone-salmeterol (ADVAIR DISKUS) 250-50 mcg/dose diskus inhaler Take 1 Puff by inhalation every twelve (12) hours. 6/6/17  Yes Kavitha Zhang MD   omeprazole (PRILOSEC) 40 mg capsule Take 1 Cap by mouth daily. 6/2/17  Yes Claudia Hammonds MD   hydroCHLOROthiazide (HYDRODIURIL) 25 mg tablet Take 1 Tab by mouth daily. 6/2/17  Yes Claudia Hammonds MD   losartan (COZAAR) 50 mg tablet Take 1 Tab by mouth daily. 6/2/17  Yes Claudia Hammonds MD   AUBAGIO 14 mg tab  3/7/17  Yes Historical Provider   cetirizine (ZYRTEC) 10 mg tablet TK 1 T PO ONCE DAILY 12/8/16  Yes Historical Provider   albuterol (PROVENTIL VENTOLIN) 2.5 mg /3 mL (0.083 %) nebulizer solution 3 mL by Nebulization route every four (4) hours as needed for Wheezing. 6/24/15  Yes Rebecca Rutledge MD   albuterol (PROVENTIL HFA) 90 mcg/actuation inhaler Take 2 Puffs by inhalation every four (4) hours as needed for Wheezing (and cough). 5/30/15  Yes PAUL Azul   norethindrone (MICRONOR) 0.35 mg tab Take 1 Tab by mouth daily. 3/21/17   Irina Eli MD   ergocalciferol (ERGOCALCIFEROL) 50,000 unit capsule Take 1 Cap by mouth every seven (7) days. 3/16/17   Cheyenne Montana MD   butalbital-acetaminophen-caffeine (FIORICET, ESGIC) -40 mg per tablet Take 1 Tab by mouth every six (6) hours as needed for Pain. Max Daily Amount: 4 Tabs.  Max 6 /24 hours 7/30/15   Rebecca Rutledge MD        No Known Allergies      Objective:     Vitals:    11/17/17 1403   BP: 118/80   Pulse: 73   Resp: 16   Temp: 98.7 °F (37.1 °C)   TempSrc: Oral   SpO2: 95%   Weight: 300 lb 6.4 oz (136.3 kg)   Height: 5' 6\" (1.676 m)        Physical Exam:    GENERAL: alert, cooperative  EYE: negative  LYMPHATIC: Cervical, supraclavicular, and axillary nodes normal.   THROAT & NECK: normal and no erythema or exudates noted. LUNG: clear to auscultation bilaterally  HEART: regular rate and rhythm  ABDOMEN: soft, non-tender  EXTREMITIES:  no edema  SKIN: Normal.  NEUROLOGIC: negative        Lab Results   Component Value Date/Time    WBC 6.9 03/16/2017 03:51 PM    Hemoglobin (POC) 10.0 07/30/2015 04:03 PM    HGB 7.2 03/16/2017 03:51 PM    HCT 26.3 03/16/2017 03:51 PM    PLATELET 481 42/87/8972 03:51 PM    MCV 63 03/16/2017 03:51 PM       Lab Results   Component Value Date/Time    Iron 10 04/28/2017 08:06 AM    TIBC 372 04/28/2017 08:06 AM    Iron % saturation 3 04/28/2017 08:06 AM    Ferritin 5 05/30/2017 02:19 PM             Assessment:     1. Iron deficiency anemia secondary to menorrhagia:    Ferritin 5 -05/30/2017     Received Feraheme June 2017 with improvement of her symptoms. Feels well. Some fatigue. Repeat CBC, Ferritin and iron profile today      Plan:       1. Serum ferritin, iron profile and CBC now  2. Return in 6 months        Signed by: Sandrine Easton MD                     November 17, 2017          CC.  MD Tong Platt MD

## 2017-11-27 DIAGNOSIS — I10 ESSENTIAL HYPERTENSION: ICD-10-CM

## 2017-11-28 RX ORDER — ACETAMINOPHEN AND CODEINE PHOSPHATE 120; 12 MG/5ML; MG/5ML
1 SOLUTION ORAL DAILY
Qty: 28 TAB | Refills: 3 | Status: SHIPPED | OUTPATIENT
Start: 2017-11-28 | End: 2018-02-14 | Stop reason: SDUPTHER

## 2017-11-28 RX ORDER — HYDROCHLOROTHIAZIDE 25 MG/1
TABLET ORAL
Qty: 90 TAB | Refills: 0 | Status: SHIPPED | OUTPATIENT
Start: 2017-11-28 | End: 2018-03-07 | Stop reason: SDUPTHER

## 2017-11-28 NOTE — TELEPHONE ENCOUNTER
Prescription sent as per MD order to patient preferred pharmacy with refills to get her to  AE due  after 3/21/18.

## 2017-11-28 NOTE — TELEPHONE ENCOUNTER
40year old patient seen for AE on 3/21/17 by Jayden Rivera. Pharmacy sent refill request for birth control     ?  Please advise regarding refill

## 2017-11-29 DIAGNOSIS — I10 ESSENTIAL HYPERTENSION: ICD-10-CM

## 2017-11-29 RX ORDER — LOSARTAN POTASSIUM 50 MG/1
TABLET ORAL
Qty: 90 TAB | Refills: 0 | Status: SHIPPED | OUTPATIENT
Start: 2017-11-29 | End: 2018-03-07 | Stop reason: SDUPTHER

## 2018-01-12 LAB
BASOPHILS # BLD AUTO: 0.1 X10E3/UL (ref 0–0.2)
BASOPHILS NFR BLD AUTO: 1 %
EOSINOPHIL # BLD AUTO: 0.3 X10E3/UL (ref 0–0.4)
EOSINOPHIL NFR BLD AUTO: 3 %
ERYTHROCYTE [DISTWIDTH] IN BLOOD BY AUTOMATED COUNT: 22.3 % (ref 12.3–15.4)
FERRITIN SERPL-MCNC: 8 NG/ML (ref 15–150)
HCT VFR BLD AUTO: 29 % (ref 34–46.6)
HGB BLD-MCNC: 8 G/DL (ref 11.1–15.9)
IMM GRANULOCYTES # BLD: 0 X10E3/UL (ref 0–0.1)
IMM GRANULOCYTES NFR BLD: 0 %
IRON SATN MFR SERPL: 4 % (ref 15–55)
IRON SERPL-MCNC: 15 UG/DL (ref 27–159)
LYMPHOCYTES # BLD AUTO: 3 X10E3/UL (ref 0.7–3.1)
LYMPHOCYTES NFR BLD AUTO: 35 %
MCH RBC QN AUTO: 18 PG (ref 26.6–33)
MCHC RBC AUTO-ENTMCNC: 27.6 G/DL (ref 31.5–35.7)
MCV RBC AUTO: 65 FL (ref 79–97)
MONOCYTES # BLD AUTO: 0.8 X10E3/UL (ref 0.1–0.9)
MONOCYTES NFR BLD AUTO: 9 %
MORPHOLOGY BLD-IMP: ABNORMAL
NEUTROPHILS # BLD AUTO: 4.4 X10E3/UL (ref 1.4–7)
NEUTROPHILS NFR BLD AUTO: 52 %
PLATELET # BLD AUTO: 601 X10E3/UL (ref 150–379)
RBC # BLD AUTO: 4.44 X10E6/UL (ref 3.77–5.28)
TIBC SERPL-MCNC: 425 UG/DL (ref 250–450)
UIBC SERPL-MCNC: 410 UG/DL (ref 131–425)
WBC # BLD AUTO: 8.6 X10E3/UL (ref 3.4–10.8)

## 2018-02-01 ENCOUNTER — HOSPITAL ENCOUNTER (OUTPATIENT)
Dept: INFUSION THERAPY | Age: 43
Discharge: HOME | End: 2018-02-01
Payer: COMMERCIAL

## 2018-02-01 VITALS
SYSTOLIC BLOOD PRESSURE: 104 MMHG | TEMPERATURE: 98 F | RESPIRATION RATE: 18 BRPM | OXYGEN SATURATION: 100 % | HEART RATE: 77 BPM | DIASTOLIC BLOOD PRESSURE: 67 MMHG

## 2018-02-01 PROCEDURE — 96374 THER/PROPH/DIAG INJ IV PUSH: CPT

## 2018-02-01 PROCEDURE — 74011250636 HC RX REV CODE- 250/636: Performed by: INTERNAL MEDICINE

## 2018-02-01 PROCEDURE — 74011000258 HC RX REV CODE- 258: Performed by: INTERNAL MEDICINE

## 2018-02-01 RX ORDER — SODIUM CHLORIDE 0.9 % (FLUSH) 0.9 %
10-40 SYRINGE (ML) INJECTION AS NEEDED
Status: ACTIVE | OUTPATIENT
Start: 2018-02-01 | End: 2018-02-02

## 2018-02-01 RX ORDER — SODIUM CHLORIDE 9 MG/ML
25 INJECTION, SOLUTION INTRAVENOUS AS NEEDED
Status: DISPENSED | OUTPATIENT
Start: 2018-02-01 | End: 2018-02-02

## 2018-02-01 RX ADMIN — FERUMOXYTOL 510 MG: 510 INJECTION INTRAVENOUS at 15:30

## 2018-02-01 RX ADMIN — Medication 10 ML: at 15:50

## 2018-02-01 NOTE — PROGRESS NOTES
Pt arrived to [facility] ambulatory in no acute distress at 1510 for Feraheme 1/2.  Assessment unremarkable. IV established in L AC site WNL, and positive blood return noted. Visit Vitals    /78 (BP 1 Location: Left arm, BP Patient Position: Sitting)    Pulse 87    Temp 98 °F (36.7 °C)    Resp 18    SpO2 100%     The following medications administered:  Feraheme 510mg IV over 15 minutes    Visit Vitals    /67    Pulse 77    Temp 98 °F (36.7 °C)    Resp 18    SpO2 100%     Pt tolerated treatment well. Pt observed for 30 minutes post infusion. Discharge instructions reviewed. IV flushed per policy and removed, 2x2 and coban placed.  Pt discharged ambulatory in no acute distress at 1625, accompanied by self. Next appointment 2/8/18 at 1500.

## 2018-02-08 ENCOUNTER — HOSPITAL ENCOUNTER (OUTPATIENT)
Dept: INFUSION THERAPY | Age: 43
Discharge: HOME | End: 2018-02-08
Payer: COMMERCIAL

## 2018-02-08 VITALS
RESPIRATION RATE: 18 BRPM | OXYGEN SATURATION: 99 % | HEART RATE: 68 BPM | TEMPERATURE: 98.6 F | DIASTOLIC BLOOD PRESSURE: 61 MMHG | SYSTOLIC BLOOD PRESSURE: 94 MMHG

## 2018-02-08 PROCEDURE — 74011000258 HC RX REV CODE- 258: Performed by: INTERNAL MEDICINE

## 2018-02-08 PROCEDURE — 96374 THER/PROPH/DIAG INJ IV PUSH: CPT

## 2018-02-08 PROCEDURE — 74011250636 HC RX REV CODE- 250/636: Performed by: INTERNAL MEDICINE

## 2018-02-08 RX ORDER — SODIUM CHLORIDE 0.9 % (FLUSH) 0.9 %
10-40 SYRINGE (ML) INJECTION AS NEEDED
Status: ACTIVE | OUTPATIENT
Start: 2018-02-08 | End: 2018-02-09

## 2018-02-08 RX ADMIN — FERUMOXYTOL 510 MG: 510 INJECTION INTRAVENOUS at 15:32

## 2018-02-08 RX ADMIN — Medication 10 ML: at 15:02

## 2018-02-08 RX ADMIN — Medication 10 ML: at 15:51

## 2018-02-08 NOTE — PROGRESS NOTES
1455 Pt arrived at [facility] ambulatory and in no distress for Feraheme. Assessment unremarkable, no new complaints voiced. #24 PIV established in Left AC WNL and positive for blood return. Patient Vitals for the past 12 hrs:   Temp Pulse Resp BP SpO2   02/08/18 1615 - 68 18 94/61 -   02/08/18 1455 98.6 °F (37 °C) 76 18 118/75 99 %       Medications received:  NS Flush  Feraheme 510 mg IV over 15 min    1620 Tolerated treatment well, no adverse reaction noted. PIV flushed and removed, coban in place. D/Cd from [facility] ambulatory and in no distress accompanied by self. Patient to follow up with physician no future [facility] appointments scheduled.

## 2018-02-14 ENCOUNTER — OFFICE VISIT (OUTPATIENT)
Dept: FAMILY MEDICINE CLINIC | Age: 43
End: 2018-02-14

## 2018-02-14 VITALS
SYSTOLIC BLOOD PRESSURE: 133 MMHG | TEMPERATURE: 98.3 F | DIASTOLIC BLOOD PRESSURE: 81 MMHG | OXYGEN SATURATION: 97 % | HEIGHT: 66 IN | WEIGHT: 293 LBS | BODY MASS INDEX: 47.09 KG/M2 | RESPIRATION RATE: 16 BRPM | HEART RATE: 73 BPM

## 2018-02-14 DIAGNOSIS — J01.00 ACUTE NON-RECURRENT MAXILLARY SINUSITIS: ICD-10-CM

## 2018-02-14 DIAGNOSIS — E55.9 VITAMIN D DEFICIENCY: ICD-10-CM

## 2018-02-14 RX ORDER — ERGOCALCIFEROL 1.25 MG/1
50000 CAPSULE ORAL
Qty: 12 CAP | Refills: 1 | Status: SHIPPED | OUTPATIENT
Start: 2018-02-14 | End: 2018-08-01 | Stop reason: SDUPTHER

## 2018-02-14 RX ORDER — FLUTICASONE PROPIONATE 50 MCG
2 SPRAY, SUSPENSION (ML) NASAL DAILY
Qty: 1 BOTTLE | Refills: 2 | Status: SHIPPED | OUTPATIENT
Start: 2018-02-14 | End: 2019-11-03 | Stop reason: SDUPTHER

## 2018-02-14 RX ORDER — AZITHROMYCIN 250 MG/1
TABLET, FILM COATED ORAL
Qty: 6 TAB | Refills: 0 | Status: SHIPPED | OUTPATIENT
Start: 2018-02-14 | End: 2018-02-19

## 2018-02-14 RX ORDER — METRONIDAZOLE 500 MG/1
TABLET ORAL
Refills: 0 | COMMUNITY
Start: 2018-01-29 | End: 2018-02-14 | Stop reason: ALTCHOICE

## 2018-02-14 NOTE — PROGRESS NOTES
Chief Complaint   Patient presents with    Sinus Infection     Sinus drainage, loss voice & cough. 1. Have you been to the ER, urgent care clinic since your last visit? Hospitalized since your last visit? no    2. Have you seen or consulted any other health care providers outside of the [address] [place] [address] since your last visit? Include any pap smears or colon screening.   Yes Neurologist & GYN

## 2018-02-14 NOTE — MR AVS SNAPSHOT
Tristen Davila [address] [facility] 
[phone] Patient: Aurelio Rodriguez MRN: [id number] :1975 Visit Information Date & Time Provider Department Dept. Phone Encounter #  
 2018  8:20 AM Ronnie Vaughn MD [facility] at [address] [place] [address] Road [id number] Follow-up Instructions Return in about 2 months (around 2018) for annual exam. Upcoming Health Maintenance Date Due DTaP/Tdap/Td series (1 - Tdap) 3/31/2017 PAP AKA CERVICAL CYTOLOGY 3/21/2020 Allergies as of 2018  Review Complete On: 2018 By: Ronnie Vaughn MD  
 No Known Allergies Current Immunizations  Reviewed on 2018 Name Date Influenza Vaccine 10/1/2017 Influenza Vaccine Intradermal PF 10/9/2015 Td, Adsorbed PF 3/30/2017 Not reviewed this visit You Were Diagnosed With   
  
 Codes Comments BMI 45.0-49.9, adult [facility])    -  Primary ICD-10-CM: K64.55 
ICD-9-CM: V85.42 Acute non-recurrent maxillary sinusitis     ICD-10-CM: J01.00 ICD-9-CM: 461.0 Vitamin D deficiency     ICD-10-CM: E55.9 ICD-9-CM: 268.9 Vitals BP Pulse Temp Resp Height(growth percentile) Weight(growth percentile) 133/81 (BP 1 Location: Left arm, BP Patient Position: Sitting) 73 98.3 °F (36.8 °C) (Oral) 16 5' 6\" (1.676 m) 301 lb 12.8 oz (136.9 kg) LMP SpO2 BMI OB Status Smoking Status 2018 97% 48.71 kg/m2 Having regular periods Never Smoker BMI and BSA Data Body Mass Index Body Surface Area 48.71 kg/m 2 2.52 m 2 Preferred Pharmacy Pharmacy Name Phone [facility] AT [address] [place] [address] AT [address] [place] [address] [phone] Your Updated Medication List  
  
   
This list is accurate as of: 18  8:48 AM.  Always use your most recent med list.  
  
  
  
  
 * albuterol 90 mcg/actuation inhaler Commonly known as:  PROVENTIL HFA Take 2 Puffs by inhalation every four (4) hours as needed for Wheezing (and cough). * albuterol 2.5 mg /3 mL (0.083 %) nebulizer solution Commonly known as:  PROVENTIL VENTOLIN  
3 mL by Nebulization route every four (4) hours as needed for Wheezing. AUBAGIO 14 mg Tab Generic drug:  teriflunomide  
  
 azithromycin 250 mg tablet Commonly known as:  Cornelius Severance Take 2 tablets today, then take 1 tablet daily  
  
 butalbital-acetaminophen-caffeine -40 mg per tablet Commonly known as:  Vernona Sober Take 1 Tab by mouth every six (6) hours as needed for Pain. Max Daily Amount: 4 Tabs. Max 6 /24 hours  
  
 cetirizine 10 mg tablet Commonly known as:  ZYRTEC TK 1 T PO ONCE DAILY  
  
 ergocalciferol 50,000 unit capsule Commonly known as:  ERGOCALCIFEROL Take 1 Cap by mouth every seven (7) days. fluticasone 50 mcg/actuation nasal spray Commonly known as:  Dmitry Conchita 2 Sprays by Both Nostrils route daily. fluticasone-salmeterol 250-50 mcg/dose diskus inhaler Commonly known as:  ADVAIR DISKUS Take 1 Puff by inhalation every twelve (12) hours. hydroCHLOROthiazide 25 mg tablet Commonly known as:  HYDRODIURIL  
TAKE 1 TABLET BY MOUTH DAILY JOLIVETTE 0.35 mg Tab Generic drug:  norethindrone TAKE 1 TABLET BY MOUTH EVERY DAY  
  
 losartan 50 mg tablet Commonly known as:  COZAAR  
TAKE 1 TABLET BY MOUTH DAILY  
  
 omeprazole 40 mg capsule Commonly known as:  PRILOSEC Take 1 Cap by mouth daily. * Notice: This list has 2 medication(s) that are the same as other medications prescribed for you. Read the directions carefully, and ask your doctor or other care provider to review them with you. Prescriptions Sent to Pharmacy Refills  
 azithromycin (ZITHROMAX) 250 mg tablet 0 Sig: Take 2 tablets today, then take 1 tablet daily  Class: Normal  
 Pharmacy: [facility] at [facility] [place] - [address] [facility]  AT [address] [place] [id number] Ph #: [phone]  
 ergocalciferol (ERGOCALCIFEROL) 50,000 unit capsule 1 Sig: Take 1 Cap by mouth every seven (7) days. Class: Normal  
 Pharmacy: [facility] at [facility] [place] - [address] [facility]  AT [address] [place] [address] Ph #: [phone] Route: Oral  
 fluticasone (FLONASE) 50 mcg/actuation nasal spray 2 Si Sprays by Both Nostrils route daily. Class: Normal  
 Pharmacy: [facility] at [facility] [address] [facility]  AT [address] [place] [id number] Ph #: [phone] Route: Both Nostrils Follow-up Instructions Return in about 2 months (around 2018) for annual exam.  
  
  
Introducing [facility]! [facility]: Thank you for requesting a [employer] account. Our records indicate that you already have an active [employer] account. You can access your account anytime at [email or web address] Did you know that you can access your hospital and ER discharge instructions at any time in [employer]? You can also review all of your test results from your hospital stay or ER visit. Additional Information If you have questions, please visit the Frequently Asked Questions section of the [employer] website at [email or web address] Remember, [employer] is NOT to be used for urgent needs. For medical emergencies, dial 911. Now available from your iPhone and Android! Please provide this summary of care documentation to your next provider. Your primary care clinician is listed as Stella [place]. If you have any questions after today's visit, please call [phone].

## 2018-02-14 NOTE — PROGRESS NOTES
Gerson Carlson is a 40 y.o. female. Patient complains of nasal congestion, drainage, hoarsed voice. Symptoms no fever, chills, or night sweats. Onset of symptoms was 4 days ago, gradually worsening since that time. She denies a history of shortness of breath and wheezing. Past history is significant for occasional episodes of bronchitis. Patient is non-smoker    Past Medical History:   Diagnosis Date    BMI 45.0-49.9, adult ([facility] 2018    Family history of diabetes mellitus 3/16/2017    Gastroesophageal reflux disease without esophagitis 3/16/2017    H/O tubal ligation 3/16/2017    HTN (hypertension) 8/10/2011    Hypertension     Iron deficiency anemia due to chronic blood loss 3/16/2017    Menorrhagia with regular cycle 4/3/2017    Migraine without aura and without status migrainosus, not intractable 3/16/2017    Migraine without aura and without status migrainosus, not intractable 3/16/2017    Mild intermittent asthma without complication     Mild intermittent asthma without complication 3/92/3715    MS (multiple sclerosis) ([facility] 10/08    using clinical trial medications    Obesity, Class III, BMI 40-49.9 (morbid obesity) ([facility] 12/3/2015    Vitamin D deficiency 3/16/2017     Past Surgical History:   Procedure Laterality Date    HX  SECTION      x3; , , &     LAP,TUBAL CAUTERY       No Known Allergies  Current Outpatient Prescriptions on File Prior to Visit   Medication Sig Dispense Refill    JOLIVETTE 0.35 mg tab TAKE 1 TABLET BY MOUTH EVERY DAY 28 Tab 2    losartan (COZAAR) 50 mg tablet TAKE 1 TABLET BY MOUTH DAILY 90 Tab 0    hydroCHLOROthiazide (HYDRODIURIL) 25 mg tablet TAKE 1 TABLET BY MOUTH DAILY 90 Tab 0    omeprazole (PRILOSEC) 40 mg capsule Take 1 Cap by mouth daily.  90 Cap 0    AUBAGIO 14 mg tab       cetirizine (ZYRTEC) 10 mg tablet TK 1 T PO ONCE DAILY  1    fluticasone-salmeterol (ADVAIR DISKUS) 250-50 mcg/dose diskus inhaler Take 1 Puff by inhalation every twelve (12) hours. 1 Each 0    butalbital-acetaminophen-caffeine (FIORICET, ESGIC) -40 mg per tablet Take 1 Tab by mouth every six (6) hours as needed for Pain. Max Daily Amount: 4 Tabs. Max 6 /24 hours 30 Tab 1    albuterol (PROVENTIL VENTOLIN) 2.5 mg /3 mL (0.083 %) nebulizer solution 3 mL by Nebulization route every four (4) hours as needed for Wheezing. 200 Each 5    albuterol (PROVENTIL HFA) 90 mcg/actuation inhaler Take 2 Puffs by inhalation every four (4) hours as needed for Wheezing (and cough). 1 Inhaler 1     No current facility-administered medications on file prior to visit. Objective:      Visit Vitals    /81 (BP 1 Location: Left arm, BP Patient Position: Sitting)    Pulse 73    Temp 98.3 °F (36.8 °C) (Oral)    Resp 16    Ht 5' 6\" (1.676 m)    Wt 301 lb 12.8 oz (136.9 kg)    LMP 02/13/2018    SpO2 97%    BMI 48.71 kg/m2      Appears slight ill-appearing. Ears: bilateral TM's and external ear canals normal  Nose: millky discharge, moderate congestion, turbinates red, swollen  Oropharynx: erythematous  Neck: bilateral symmetric anterior adenopathy  Lungs: clear to auscultation, no wheezes, rales or rhonchi, symmetric air entry  CVS: regular rate and rhythm, S1, S2 normal, no murmur, click, rub or gallop  The abdomen is soft without tenderness or hepatosplenomegaly. Assessment/Plan:     Per orders. Gargle, use acetaminophen or other OTC analgesic, and take Rx fully as prescribed. Call if other family members develop similar symptoms. See prn. Diagnoses and all orders for this visit:    1. BMI 45.0-49.9, adult ([facility] [address]    2. Acute non-recurrent maxillary sinusitis  -     azithromycin (ZITHROMAX) 250 mg tablet; Take 2 tablets today, then take 1 tablet daily  -     fluticasone (FLONASE) 50 mcg/actuation nasal spray; 2 Sprays by Both Nostrils route daily. 3. Vitamin D deficiency  -     ergocalciferol (ERGOCALCIFEROL) 50,000 unit capsule;  Take 1 Cap by mouth every seven (7) days.       Follow-up Disposition:  Return in about 2 months (around 4/14/2018) for annual exam.      Dasia Villeda MD  2/14/2018

## 2018-02-26 RX ORDER — FLUTICASONE PROPIONATE AND SALMETEROL 250; 50 UG/1; UG/1
1 POWDER RESPIRATORY (INHALATION) EVERY 12 HOURS
Qty: 1 EACH | Refills: 2 | Status: SHIPPED | OUTPATIENT
Start: 2018-02-26 | End: 2019-11-03 | Stop reason: SDUPTHER

## 2018-02-26 RX ORDER — ALBUTEROL SULFATE 90 UG/1
2 AEROSOL, METERED RESPIRATORY (INHALATION)
Qty: 1 INHALER | Refills: 2 | Status: SHIPPED | OUTPATIENT
Start: 2018-02-26 | End: 2019-12-27 | Stop reason: SDUPTHER

## 2018-02-26 RX ORDER — ALBUTEROL SULFATE 0.83 MG/ML
2.5 SOLUTION RESPIRATORY (INHALATION)
Qty: 200 EACH | Refills: 5 | Status: SHIPPED | OUTPATIENT
Start: 2018-02-26 | End: 2020-05-21 | Stop reason: SDUPTHER

## 2018-03-07 DIAGNOSIS — I10 ESSENTIAL HYPERTENSION: ICD-10-CM

## 2018-03-07 DIAGNOSIS — K21.9 GASTROESOPHAGEAL REFLUX DISEASE WITHOUT ESOPHAGITIS: ICD-10-CM

## 2018-03-07 NOTE — TELEPHONE ENCOUNTER
Pt is calling in ref to Refill request - originially rejected by Dr. Enoch Loco but, Dr. Randy So is out of the office on vacation   Pls call patient and let her know [phone]    Open      3/7/2018  [facility] MD Amanda   Family Practice    Gastroesophageal reflux disease without esophagitis +1 more   Dx    Medication Refill   Reason for call    Conversation: Medication Refill   (Oldest Message First)   Baljinder Sauce Branch   to Bin Hyde MD           3/7/18 10:49 AM   Original authorizing provider: MD Era Mark would like a refill of the following medications:   omeprazole (PRILOSEC) 40 mg capsule Bin yHde MD]   hydroCHLOROthiazide (HYDRODIURIL) 25 mg tablet Bin Hyde MD]   losartan (COZAAR) 50 mg tablet Bin Hyde MD]     Preferred pharmacy: [facility] AT [address] [place] [address] DR AT [address] [place] [address]     Comment:     Suzie Saba LPN   to Era Hutchinson           3/7/18 11:22 AM   Please send refills to Dr. Benedicto Lucero read by Era Hutchinson at 1:31 PM on 3/7/2018. This encounter is not signed.  The conversation may still be ongoing

## 2018-03-08 RX ORDER — HYDROCHLOROTHIAZIDE 25 MG/1
TABLET ORAL
Qty: 30 TAB | Refills: 0 | Status: SHIPPED | OUTPATIENT
Start: 2018-03-08 | End: 2018-04-07 | Stop reason: SDUPTHER

## 2018-03-08 RX ORDER — LOSARTAN POTASSIUM 50 MG/1
TABLET ORAL
Qty: 30 TAB | Refills: 0 | Status: SHIPPED | OUTPATIENT
Start: 2018-03-08 | End: 2018-04-07 | Stop reason: SDUPTHER

## 2018-03-08 RX ORDER — OMEPRAZOLE 40 MG/1
40 CAPSULE, DELAYED RELEASE ORAL DAILY
Qty: 30 CAP | Refills: 0 | Status: SHIPPED | OUTPATIENT
Start: 2018-03-08 | End: 2018-04-07 | Stop reason: SDUPTHER

## 2018-03-19 DIAGNOSIS — J45.20 MILD INTERMITTENT ASTHMA WITHOUT COMPLICATION: Primary | ICD-10-CM

## 2018-04-16 ENCOUNTER — OFFICE VISIT (OUTPATIENT)
Dept: FAMILY MEDICINE CLINIC | Age: 43
End: 2018-04-16

## 2018-04-16 VITALS
HEIGHT: 66 IN | BODY MASS INDEX: 47.09 KG/M2 | HEART RATE: 71 BPM | DIASTOLIC BLOOD PRESSURE: 83 MMHG | TEMPERATURE: 99.1 F | SYSTOLIC BLOOD PRESSURE: 126 MMHG | OXYGEN SATURATION: 96 % | WEIGHT: 293 LBS | RESPIRATION RATE: 18 BRPM

## 2018-04-16 DIAGNOSIS — I10 ESSENTIAL HYPERTENSION: ICD-10-CM

## 2018-04-16 DIAGNOSIS — Z98.890 S/P ENDOMETRIAL ABLATION: ICD-10-CM

## 2018-04-16 DIAGNOSIS — Z00.00 ANNUAL PHYSICAL EXAM: Primary | ICD-10-CM

## 2018-04-16 DIAGNOSIS — M48.061 FORAMINAL STENOSIS OF LUMBAR REGION: ICD-10-CM

## 2018-04-16 DIAGNOSIS — Z13.220 SCREENING CHOLESTEROL LEVEL: ICD-10-CM

## 2018-04-16 DIAGNOSIS — D50.0 IRON DEFICIENCY ANEMIA DUE TO CHRONIC BLOOD LOSS: ICD-10-CM

## 2018-04-16 DIAGNOSIS — Z83.3 FAMILY HISTORY OF DIABETES MELLITUS: ICD-10-CM

## 2018-04-16 DIAGNOSIS — K21.9 GASTROESOPHAGEAL REFLUX DISEASE WITHOUT ESOPHAGITIS: ICD-10-CM

## 2018-04-16 RX ORDER — GABAPENTIN 300 MG/1
300 CAPSULE ORAL
Qty: 30 CAP | Refills: 2 | Status: SHIPPED | OUTPATIENT
Start: 2018-04-16 | End: 2019-03-04 | Stop reason: SDUPTHER

## 2018-04-16 RX ORDER — PREDNISONE 10 MG/1
TABLET ORAL
Qty: 21 TAB | Refills: 0 | Status: SHIPPED | OUTPATIENT
Start: 2018-04-16 | End: 2018-08-29 | Stop reason: ALTCHOICE

## 2018-04-16 RX ORDER — HYDROCHLOROTHIAZIDE 25 MG/1
TABLET ORAL
Qty: 90 TAB | Refills: 1 | Status: SHIPPED | OUTPATIENT
Start: 2018-04-16 | End: 2019-01-22 | Stop reason: SDUPTHER

## 2018-04-16 RX ORDER — OMEPRAZOLE 40 MG/1
CAPSULE, DELAYED RELEASE ORAL
Qty: 90 CAP | Refills: 1 | Status: SHIPPED | OUTPATIENT
Start: 2018-04-16 | End: 2019-01-22 | Stop reason: SDUPTHER

## 2018-04-16 RX ORDER — LOSARTAN POTASSIUM 50 MG/1
TABLET ORAL
Qty: 90 TAB | Refills: 1 | Status: SHIPPED | OUTPATIENT
Start: 2018-04-16 | End: 2019-01-22 | Stop reason: SDUPTHER

## 2018-04-16 NOTE — MR AVS SNAPSHOT
[facility] [address] [facility] 
[phone] Patient: Sara Kern MRN: [id number] :1975 Visit Information Date & Time Provider Department Dept. Phone Encounter #  
 2018  4:00 PM Tsering Dye MD [facility] at [address] [id number] Follow-up Instructions Return in about 4 months (around 2018) for htn, sooner as needed. Upcoming Health Maintenance Date Due DTaP/Tdap/Td series (1 - Tdap) 3/31/2017 PAP AKA CERVICAL CYTOLOGY 3/21/2020 Allergies as of 2018  Review Complete On: 2018 By: Tsering Dye MD  
 No Known Allergies Current Immunizations  Reviewed on 2018 Name Date Influenza Vaccine 10/1/2017 Influenza Vaccine Intradermal PF 10/9/2015 Td, Adsorbed PF 3/30/2017 Not reviewed this visit You Were Diagnosed With   
  
 Codes Comments Annual physical exam    -  Primary ICD-10-CM: Z00.00 ICD-9-CM: V70.0 Essential hypertension     ICD-10-CM: I10 
ICD-9-CM: 401.9 Gastroesophageal reflux disease without esophagitis     ICD-10-CM: K21.9 ICD-9-CM: 530.81 BMI 45.0-49.9, adult [facility])     ICD-10-CM: E55.00 
ICD-9-CM: V85.42 Family history of diabetes mellitus     ICD-10-CM: Z83.3 ICD-9-CM: V18.0 Iron deficiency anemia due to chronic blood loss     ICD-10-CM: D50.0 ICD-9-CM: 280.0 S/P endometrial ablation     ICD-10-CM: R71.709 ICD-9-CM: V45.89 Screening cholesterol level     ICD-10-CM: R97.507 ICD-9-CM: V77.91 Foraminal stenosis of lumbar region     ICD-10-CM: M99.83 ICD-9-CM: 724.02 Vitals BP Pulse Temp Resp Height(growth percentile) Weight(growth percentile) 126/83 (BP 1 Location: Left arm, BP Patient Position: Sitting) 71 99.1 °F (37.3 °C) (Oral) 18 5' 6\" (1.676 m) 302 lb 12.8 oz (137.3 kg) LMP SpO2 BMI OB Status Smoking Status 02/08/2018 96% 48.87 kg/m2 Ablation Never Smoker BMI and BSA Data Body Mass Index Body Surface Area  
 48.87 kg/m 2 2.53 m 2 Preferred Pharmacy Pharmacy Name Phone [facility] AT [address] [phone] Your Updated Medication List  
  
   
This list is accurate as of 4/16/18  4:28 PM.  Always use your most recent med list.  
  
  
  
  
 * albuterol 90 mcg/actuation inhaler Commonly known as:  PROVENTIL HFA Take 2 Puffs by inhalation every four (4) hours as needed for Wheezing (and cough). * albuterol 2.5 mg /3 mL (0.083 %) nebulizer solution Commonly known as:  PROVENTIL VENTOLIN  
3 mL by Nebulization route every four (4) hours as needed for Wheezing. AUBAGIO 14 mg Tab Generic drug:  teriflunomide  
  
 butalbital-acetaminophen-caffeine -40 mg per tablet Commonly known as:  Tomasz Friend Take 1 Tab by mouth every six (6) hours as needed for Pain. Max Daily Amount: 4 Tabs. Max 6 /24 hours  
  
 cetirizine 10 mg tablet Commonly known as:  ZYRTEC TK 1 T PO ONCE DAILY  
  
 ergocalciferol 50,000 unit capsule Commonly known as:  ERGOCALCIFEROL Take 1 Cap by mouth every seven (7) days. fluticasone 50 mcg/actuation nasal spray Commonly known as:  Carol Courser 2 Sprays by Both Nostrils route daily. fluticasone-salmeterol 250-50 mcg/dose diskus inhaler Commonly known as:  ADVAIR DISKUS Take 1 Puff by inhalation every twelve (12) hours. gabapentin 300 mg capsule Commonly known as:  NEURONTIN Take 1 Cap by mouth nightly. hydroCHLOROthiazide 25 mg tablet Commonly known as:  HYDRODIURIL  
TAKE 1 TABLET BY MOUTH DAILY losartan 50 mg tablet Commonly known as:  COZAAR  
TAKE 1 TABLET BY MOUTH DAILY Nebulizer Accessories Kit Per patient's needs. omeprazole 40 mg capsule Commonly known as:  PRILOSEC  
 TAKE 1 CAPSULE BY MOUTH DAILY predniSONE 10 mg dose pack Commonly known as:  STERAPRED DS See administration instruction per 10mg dose pack * Notice: This list has 2 medication(s) that are the same as other medications prescribed for you. Read the directions carefully, and ask your doctor or other care provider to review them with you. Prescriptions Sent to Pharmacy Refills  
 hydroCHLOROthiazide (HYDRODIURIL) 25 mg tablet 1 Sig: TAKE 1 TABLET BY MOUTH DAILY Class: Normal  
 Pharmacy: [facility] at [facility] [address] [address]  AT [address] [place] [address] Ph #: [phone]  
 omeprazole (PRILOSEC) 40 mg capsule 1 Sig: TAKE 1 CAPSULE BY MOUTH DAILY Class: Normal  
 Pharmacy: [facility] [address] [address]  AT [address] [place] [address] Ph #: [phone]  
 losartan (COZAAR) 50 mg tablet 1 Sig: TAKE 1 TABLET BY MOUTH DAILY Class: Normal  
 Pharmacy: [facility] [address] [address]  AT [address] [place] [address] Ph #: [phone]  
 gabapentin (NEURONTIN) 300 mg capsule 2 Sig: Take 1 Cap by mouth nightly. Class: Normal  
 Pharmacy: [facility] [address] [address]  AT [address] [place], [place] [id number] Ph #: [phone] Route: Oral  
 predniSONE (STERAPRED DS) 10 mg dose pack 0 Sig: See administration instruction per 10mg dose pack Class: Normal  
 Pharmacy: [facility] [address] [address]  AT [address] [place] [address] Ph #: [phone] We Performed the Following CBC W/O DIFF [20904 CPT(R)] HEMOGLOBIN A1C W/O EAG [06935 CPT(R)] LIPID PANEL [88143 CPT(R)] METABOLIC PANEL, COMPREHENSIVE [40786 CPT(R)] REFERRAL TO PAIN CLINIC [JZE06 Custom] TSH RFX ON ABNORMAL TO FREE T4 [[id number] Custom] URINALYSIS W/ RFLX MICROSCOPIC [07240 CPT(R)] Follow-up Instructions Return in about 4 months (around 8/16/2018) for htn, sooner as needed. Referral Information Referral ID Referred By Referred To  
  
 [id number] AMBROCIO DHILLON [facility] [address] Visits Status Start Date End Date 1 New Request 4/16/18 4/16/19 If your referral has a status of pending review or denied, additional information will be sent to support the outcome of this decision. Introducing [facility] & HEALTH SERVICES! Dear Torey Aviles: Thank you for requesting a Hitwise account. Our records indicate that you already have an active Hitwise account. You can access your account anytime at [email or web address] Did you know that you can access your hospital and ER discharge instructions at any time in Hitwise? You can also review all of your test results from your hospital stay or ER visit. Additional Information If you have questions, please visit the Frequently Asked Questions section of the Hitwise website at [email or web address] Remember, Hitwise is NOT to be used for urgent needs. For medical emergencies, dial 911. Now available from your iPhone and Android! Please provide this summary of care documentation to your next provider. Your primary care clinician is listed as Alcides Phelps. If you have any questions after today's visit, please call [phone].

## 2018-04-16 NOTE — PROGRESS NOTES
Subjective:   44 y.o. female for annual routine Pap and checkup. Patient's last menstrual period was 02/08/2018.     has a past medical history of BMI 45.0-49.9, adult ([facility] (2/14/2018); Essential hypertension (4/17/2018); Gastroesophageal reflux disease without esophagitis (3/16/2017); H/O tubal ligation (3/16/2017); Iron deficiency anemia due to chronic blood loss (3/16/2017); Menorrhagia with regular cycle (4/3/2017); Migraine without aura and without status migrainosus, not intractable (3/16/2017); Mild intermittent asthma without complication (5/93/1818); MS (multiple sclerosis) ([facility] (10/08); Obesity, Class III, BMI 40-49.9 (morbid obesity) ([facility] (12/3/2015); S/P endometrial ablation (4/16/2018); and Vitamin D deficiency (3/16/2017). Chronic low back pain with sciatica on left. Previous MRI showed moderate severe canal stenosis L5. Have had lumbar injection. Will refer back to pain clinic b/c she doesn't want surgery right now. Back pain worse recently. Denies recent or new injuries or trauma or heavy lifting, fever, chills, LE weakness, saddle anesthesia, loss of bowel or bladder. We'll do prednisone, start gabapentin. Asthma stable. HTN: BP at goal, continue course, labs monitoring. S/p endometrial ablation. Has GYN: mammogram scheduled for future.        Patient Active Problem List    Diagnosis Date Noted    Essential hypertension 04/17/2018    S/P endometrial ablation 04/16/2018    BMI 45.0-49.9, adult ([facility] 02/14/2018    Menorrhagia with regular cycle 04/03/2017    Mild intermittent asthma without complication 85/53/7914    Vitamin D deficiency 03/16/2017    Iron deficiency anemia due to chronic blood loss 03/16/2017    Migraine without aura and without status migrainosus, not intractable 03/16/2017    Family history of diabetes mellitus 03/16/2017    H/O tubal ligation 03/16/2017    Gastroesophageal reflux disease without esophagitis 03/16/2017    Obesity, Class III, BMI 40-49.9 (morbid obesity) ([facility] 2015    Heavy menstrual period 2013    Anemia 2013    Multiple sclerosis ([facility] 08/10/2011     Current Outpatient Prescriptions   Medication Sig Dispense Refill    hydroCHLOROthiazide (HYDRODIURIL) 25 mg tablet TAKE 1 TABLET BY MOUTH DAILY 90 Tab 1    omeprazole (PRILOSEC) 40 mg capsule TAKE 1 CAPSULE BY MOUTH DAILY 90 Cap 1    losartan (COZAAR) 50 mg tablet TAKE 1 TABLET BY MOUTH DAILY 90 Tab 1    gabapentin (NEURONTIN) 300 mg capsule Take 1 Cap by mouth nightly. 30 Cap 2    predniSONE (STERAPRED DS) 10 mg dose pack See administration instruction per 10mg dose pack 21 Tab 0    Nebulizer Accessories kit Per patient's needs. 1 Kit 0    albuterol (PROVENTIL HFA) 90 mcg/actuation inhaler Take 2 Puffs by inhalation every four (4) hours as needed for Wheezing (and cough). 1 Inhaler 2    albuterol (PROVENTIL VENTOLIN) 2.5 mg /3 mL (0.083 %) nebulizer solution 3 mL by Nebulization route every four (4) hours as needed for Wheezing. 200 Each 5    fluticasone-salmeterol (ADVAIR DISKUS) 250-50 mcg/dose diskus inhaler Take 1 Puff by inhalation every twelve (12) hours. 1 Each 2    ergocalciferol (ERGOCALCIFEROL) 50,000 unit capsule Take 1 Cap by mouth every seven (7) days. 12 Cap 1    fluticasone (FLONASE) 50 mcg/actuation nasal spray 2 Sprays by Both Nostrils route daily. 1 Bottle 2    AUBAGIO 14 mg tab       cetirizine (ZYRTEC) 10 mg tablet TK 1 T PO ONCE DAILY  1    butalbital-acetaminophen-caffeine (FIORICET, ESGIC) -40 mg per tablet Take 1 Tab by mouth every six (6) hours as needed for Pain. Max Daily Amount: 4 Tabs.  Max 6 /24 hours 30 Tab 1     No Known Allergies  Past Surgical History:   Procedure Laterality Date    HX  SECTION      x3; , , &     LAP,TUBAL CAUTERY       Family History   Problem Relation Age of Onset    Diabetes Mother     Hypertension Mother      Social History   Substance Use Topics    Smoking status: Never Smoker    Smokeless tobacco: Never Used    Alcohol use 1.0 oz/week     1 Glasses of wine, 1 Shots of liquor per week      Comment: socially        ROS:  Feeling well. No dyspnea or chest pain on exertion. No abdominal pain, change in bowel habits, black or bloody stools. No urinary tract symptoms. GYN ROS: s/p endometrial ablation, no abnormal bleeding, pelvic pain or discharge, no breast pain or new or enlarging lumps on self exam. No neurological complaints. Objective:     Visit Vitals    /83 (BP 1 Location: Left arm, BP Patient Position: Sitting)    Pulse 71    Temp 99.1 °F (37.3 °C) (Oral)    Resp 18    Ht 5' 6\" (1.676 m)    Wt 302 lb 12.8 oz (137.3 kg)    LMP 02/08/2018    SpO2 96%    BMI 48.87 kg/m2       General:  Alert, cooperative, no distress, appears stated age. Head:  Normocephalic, without obvious abnormality, atraumatic. Eyes:  Conjunctivae/corneas clear. PERRL, EOMs intact. Ears:  Normal TMs and external ear canals both ears. Throat: Lips, mucosa, and tongue normal. Teeth and gums normal.   Neck: Supple, symmetrical, trachea midline, no carotid bruit and no JVD. Back:   Symmetric, no curvature. ROM normal. No CVA tenderness. Lungs:   Clear to auscultation bilaterally. Heart:  Regular rate and rhythm, S1, S2 normal, no murmur, click, rub or gallop. Abdomen:   Soft, non-tender. Extremities: Extremities normal, atraumatic, no cyanosis or edema. Pulses: 2+ and symmetric all extremities. Neurologic: CNII-XII intact. Normal strength, sensation and reflexes throughout. BREAST EXAM: done at [facility]: done at GYN    LE: Normal strength and sensation, bilat. Assessment/Plan:     Diagnoses and all orders for this visit:    1. Annual physical exam  -     CBC W/O DIFF  -     HEMOGLOBIN A1C W/O EAG  -     LIPID PANEL  -     METABOLIC PANEL, COMPREHENSIVE  -     TSH RFX ON ABNORMAL TO FREE T4  -     URINALYSIS W/ RFLX MICROSCOPIC    2. Essential hypertension  -     hydroCHLOROthiazide (HYDRODIURIL) 25 mg tablet; TAKE 1 TABLET BY MOUTH DAILY  -     losartan (COZAAR) 50 mg tablet; TAKE 1 TABLET BY MOUTH DAILY  -     METABOLIC PANEL, COMPREHENSIVE  -     TSH RFX ON ABNORMAL TO FREE T4    3. Gastroesophageal reflux disease without esophagitis  -     omeprazole (PRILOSEC) 40 mg capsule; TAKE 1 CAPSULE BY MOUTH DAILY    4. BMI 45.0-49.9, adult ([facility]    5. Family history of diabetes mellitus  -     HEMOGLOBIN A1C W/O EAG    6. Iron deficiency anemia due to chronic blood loss  -     CBC W/O DIFF    7. S/P endometrial ablation    8. Screening cholesterol level  -     LIPID PANEL    9. Foraminal stenosis of lumbar region  -     REFERRAL TO PAIN CLINIC  -     gabapentin (NEURONTIN) 300 mg capsule; Take 1 Cap by mouth nightly. -     predniSONE (STERAPRED DS) 10 mg dose pack; See administration instruction per 10mg dose pack      Follow-up Disposition:  Return in about 4 months (around 8/16/2018) for htn, sooner as needed. Nancy Armstrong MD  4/17/2018

## 2018-04-17 PROBLEM — I10 ESSENTIAL HYPERTENSION: Status: ACTIVE | Noted: 2018-04-17

## 2018-04-18 LAB
ALBUMIN SERPL-MCNC: 3.8 G/DL (ref 3.5–5.5)
ALBUMIN/GLOB SERPL: 1.3 {RATIO} (ref 1.2–2.2)
ALP SERPL-CCNC: 50 IU/L (ref 39–117)
ALT SERPL-CCNC: 25 IU/L (ref 0–32)
APPEARANCE UR: CLEAR
AST SERPL-CCNC: 21 IU/L (ref 0–40)
BILIRUB SERPL-MCNC: 0.3 MG/DL (ref 0–1.2)
BILIRUB UR QL STRIP: NEGATIVE
BUN SERPL-MCNC: 9 MG/DL (ref 6–24)
BUN/CREAT SERPL: 10 (ref 9–23)
CALCIUM SERPL-MCNC: 9.4 MG/DL (ref 8.7–10.2)
CHLORIDE SERPL-SCNC: 100 MMOL/L (ref 96–106)
CHOLEST SERPL-MCNC: 225 MG/DL (ref 100–199)
CO2 SERPL-SCNC: 27 MMOL/L (ref 18–29)
COLOR UR: YELLOW
CREAT SERPL-MCNC: 0.86 MG/DL (ref 0.57–1)
ERYTHROCYTE [DISTWIDTH] IN BLOOD BY AUTOMATED COUNT: 25.4 % (ref 12.3–15.4)
GFR SERPLBLD CREATININE-BSD FMLA CKD-EPI: 84 ML/MIN/1.73
GFR SERPLBLD CREATININE-BSD FMLA CKD-EPI: 96 ML/MIN/1.73
GLOBULIN SER CALC-MCNC: 2.9 G/DL (ref 1.5–4.5)
GLUCOSE SERPL-MCNC: 92 MG/DL (ref 65–99)
GLUCOSE UR QL: NEGATIVE
HBA1C MFR BLD: 5.1 % (ref 4.8–5.6)
HCT VFR BLD AUTO: 38.1 % (ref 34–46.6)
HDLC SERPL-MCNC: 51 MG/DL
HGB BLD-MCNC: 11.8 G/DL (ref 11.1–15.9)
HGB UR QL STRIP: NEGATIVE
KETONES UR QL STRIP: NEGATIVE
LDLC SERPL CALC-MCNC: 152 MG/DL (ref 0–99)
LEUKOCYTE ESTERASE UR QL STRIP: NEGATIVE
MCH RBC QN AUTO: 25.3 PG (ref 26.6–33)
MCHC RBC AUTO-ENTMCNC: 31 G/DL (ref 31.5–35.7)
MCV RBC AUTO: 82 FL (ref 79–97)
MICRO URNS: NORMAL
NITRITE UR QL STRIP: NEGATIVE
PH UR STRIP: 5.5 [PH] (ref 5–7.5)
PLATELET # BLD AUTO: 405 X10E3/UL (ref 150–379)
POTASSIUM SERPL-SCNC: 4.1 MMOL/L (ref 3.5–5.2)
PROT SERPL-MCNC: 6.7 G/DL (ref 6–8.5)
PROT UR QL STRIP: NORMAL
RBC # BLD AUTO: 4.66 X10E6/UL (ref 3.77–5.28)
SODIUM SERPL-SCNC: 144 MMOL/L (ref 134–144)
SP GR UR: 1.02 (ref 1–1.03)
TRIGL SERPL-MCNC: 108 MG/DL (ref 0–149)
TSH SERPL DL<=0.005 MIU/L-ACNC: 2.14 UIU/ML (ref 0.45–4.5)
UROBILINOGEN UR STRIP-MCNC: 0.2 MG/DL (ref 0.2–1)
VLDLC SERPL CALC-MCNC: 22 MG/DL (ref 5–40)
WBC # BLD AUTO: 7.1 X10E3/UL (ref 3.4–10.8)

## 2018-06-21 ENCOUNTER — HOSPITAL ENCOUNTER (INPATIENT)
Age: 43
LOS: 2 days | Discharge: HOME | DRG: 059 | End: 2018-06-23
Attending: EMERGENCY MEDICINE | Admitting: INTERNAL MEDICINE
Payer: COMMERCIAL

## 2018-06-21 DIAGNOSIS — M54.9 BACK PAIN WITH RADIATION: ICD-10-CM

## 2018-06-21 DIAGNOSIS — G43.819 OTHER MIGRAINE WITHOUT STATUS MIGRAINOSUS, INTRACTABLE: ICD-10-CM

## 2018-06-21 DIAGNOSIS — G35 ACUTE RELAPSING MULTIPLE SCLEROSIS (HCC): ICD-10-CM

## 2018-06-21 DIAGNOSIS — R42 DIZZINESS: ICD-10-CM

## 2018-06-21 DIAGNOSIS — G43.009 MIGRAINE WITHOUT AURA AND WITHOUT STATUS MIGRAINOSUS, NOT INTRACTABLE: ICD-10-CM

## 2018-06-21 DIAGNOSIS — G35 MS (MULTIPLE SCLEROSIS) (HCC): Primary | ICD-10-CM

## 2018-06-21 LAB
ALBUMIN SERPL-MCNC: 3.7 G/DL (ref 3.5–5)
ALBUMIN/GLOB SERPL: 1 {RATIO} (ref 1.1–2.2)
ALP SERPL-CCNC: 51 U/L (ref 45–117)
ALT SERPL-CCNC: 27 U/L (ref 12–78)
ANION GAP SERPL CALC-SCNC: 7 MMOL/L (ref 5–15)
APPEARANCE UR: ABNORMAL
AST SERPL-CCNC: 14 U/L (ref 15–37)
BACTERIA URNS QL MICRO: NEGATIVE /HPF
BASOPHILS # BLD: 0.1 K/UL (ref 0–0.1)
BASOPHILS NFR BLD: 1 % (ref 0–1)
BILIRUB SERPL-MCNC: 0.3 MG/DL (ref 0.2–1)
BILIRUB UR QL: NEGATIVE
BUN SERPL-MCNC: 16 MG/DL (ref 6–20)
BUN/CREAT SERPL: 15 (ref 12–20)
CALCIUM SERPL-MCNC: 9.1 MG/DL (ref 8.5–10.1)
CHLORIDE SERPL-SCNC: 102 MMOL/L (ref 97–108)
CO2 SERPL-SCNC: 33 MMOL/L (ref 21–32)
COLOR UR: ABNORMAL
CREAT SERPL-MCNC: 1.1 MG/DL (ref 0.55–1.02)
DIFFERENTIAL METHOD BLD: ABNORMAL
EOSINOPHIL # BLD: 0.1 K/UL (ref 0–0.4)
EOSINOPHIL NFR BLD: 1 % (ref 0–7)
EPITH CASTS URNS QL MICRO: ABNORMAL /LPF
ERYTHROCYTE [DISTWIDTH] IN BLOOD BY AUTOMATED COUNT: 16.9 % (ref 11.5–14.5)
GLOBULIN SER CALC-MCNC: 3.6 G/DL (ref 2–4)
GLUCOSE SERPL-MCNC: 87 MG/DL (ref 65–100)
GLUCOSE UR STRIP.AUTO-MCNC: NEGATIVE MG/DL
HCT VFR BLD AUTO: 37.9 % (ref 35–47)
HGB BLD-MCNC: 12.1 G/DL (ref 11.5–16)
HGB UR QL STRIP: NEGATIVE
HYALINE CASTS URNS QL MICRO: ABNORMAL /LPF (ref 0–5)
IMM GRANULOCYTES # BLD: 0.1 K/UL (ref 0–0.04)
IMM GRANULOCYTES NFR BLD AUTO: 0 % (ref 0–0.5)
KETONES UR QL STRIP.AUTO: NEGATIVE MG/DL
LEUKOCYTE ESTERASE UR QL STRIP.AUTO: ABNORMAL
LYMPHOCYTES # BLD: 4.2 K/UL (ref 0.8–3.5)
LYMPHOCYTES NFR BLD: 33 % (ref 12–49)
MCH RBC QN AUTO: 28.7 PG (ref 26–34)
MCHC RBC AUTO-ENTMCNC: 31.9 G/DL (ref 30–36.5)
MCV RBC AUTO: 89.8 FL (ref 80–99)
MONOCYTES # BLD: 1.1 K/UL (ref 0–1)
MONOCYTES NFR BLD: 9 % (ref 5–13)
NEUTS SEG # BLD: 7 K/UL (ref 1.8–8)
NEUTS SEG NFR BLD: 56 % (ref 32–75)
NITRITE UR QL STRIP.AUTO: NEGATIVE
NRBC # BLD: 0 K/UL (ref 0–0.01)
NRBC BLD-RTO: 0 PER 100 WBC
PH UR STRIP: 5 [PH] (ref 5–8)
PLATELET # BLD AUTO: 387 K/UL (ref 150–400)
PMV BLD AUTO: 10.2 FL (ref 8.9–12.9)
POTASSIUM SERPL-SCNC: 3.2 MMOL/L (ref 3.5–5.1)
PROT SERPL-MCNC: 7.3 G/DL (ref 6.4–8.2)
PROT UR STRIP-MCNC: NEGATIVE MG/DL
RBC # BLD AUTO: 4.22 M/UL (ref 3.8–5.2)
RBC #/AREA URNS HPF: ABNORMAL /HPF (ref 0–5)
SODIUM SERPL-SCNC: 142 MMOL/L (ref 136–145)
SP GR UR REFRACTOMETRY: 1.02 (ref 1–1.03)
UROBILINOGEN UR QL STRIP.AUTO: 0.2 EU/DL (ref 0.2–1)
WBC # BLD AUTO: 12.7 K/UL (ref 3.6–11)
WBC URNS QL MICRO: ABNORMAL /HPF (ref 0–4)

## 2018-06-21 PROCEDURE — 74011250636 HC RX REV CODE- 250/636: Performed by: INTERNAL MEDICINE

## 2018-06-21 PROCEDURE — 99285 EMERGENCY DEPT VISIT HI MDM: CPT

## 2018-06-21 PROCEDURE — 85025 COMPLETE CBC W/AUTO DIFF WBC: CPT | Performed by: EMERGENCY MEDICINE

## 2018-06-21 PROCEDURE — 74011250637 HC RX REV CODE- 250/637: Performed by: INTERNAL MEDICINE

## 2018-06-21 PROCEDURE — 93005 ELECTROCARDIOGRAM TRACING: CPT

## 2018-06-21 PROCEDURE — 65660000000 HC RM CCU STEPDOWN

## 2018-06-21 PROCEDURE — 81001 URINALYSIS AUTO W/SCOPE: CPT | Performed by: EMERGENCY MEDICINE

## 2018-06-21 PROCEDURE — 80053 COMPREHEN METABOLIC PANEL: CPT | Performed by: EMERGENCY MEDICINE

## 2018-06-21 PROCEDURE — 74011000258 HC RX REV CODE- 258: Performed by: INTERNAL MEDICINE

## 2018-06-21 PROCEDURE — 36415 COLL VENOUS BLD VENIPUNCTURE: CPT | Performed by: EMERGENCY MEDICINE

## 2018-06-21 RX ORDER — SODIUM CHLORIDE 9 MG/ML
50 INJECTION, SOLUTION INTRAVENOUS CONTINUOUS
Status: DISCONTINUED | OUTPATIENT
Start: 2018-06-21 | End: 2018-06-22

## 2018-06-21 RX ORDER — FLUTICASONE FUROATE AND VILANTEROL 100; 25 UG/1; UG/1
1 POWDER RESPIRATORY (INHALATION) DAILY
Status: DISCONTINUED | OUTPATIENT
Start: 2018-06-22 | End: 2018-06-23 | Stop reason: HOSPADM

## 2018-06-21 RX ORDER — ONDANSETRON 2 MG/ML
4 INJECTION INTRAMUSCULAR; INTRAVENOUS
Status: DISCONTINUED | OUTPATIENT
Start: 2018-06-21 | End: 2018-06-23 | Stop reason: HOSPADM

## 2018-06-21 RX ORDER — HYDROCHLOROTHIAZIDE 25 MG/1
25 TABLET ORAL DAILY
Status: DISCONTINUED | OUTPATIENT
Start: 2018-06-22 | End: 2018-06-23 | Stop reason: HOSPADM

## 2018-06-21 RX ORDER — PANTOPRAZOLE SODIUM 40 MG/1
40 TABLET, DELAYED RELEASE ORAL
Status: DISCONTINUED | OUTPATIENT
Start: 2018-06-22 | End: 2018-06-23 | Stop reason: HOSPADM

## 2018-06-21 RX ORDER — POTASSIUM CHLORIDE 750 MG/1
40 TABLET, FILM COATED, EXTENDED RELEASE ORAL
Status: COMPLETED | OUTPATIENT
Start: 2018-06-21 | End: 2018-06-21

## 2018-06-21 RX ORDER — SODIUM CHLORIDE 0.9 % (FLUSH) 0.9 %
5-10 SYRINGE (ML) INJECTION EVERY 8 HOURS
Status: DISCONTINUED | OUTPATIENT
Start: 2018-06-21 | End: 2018-06-23 | Stop reason: HOSPADM

## 2018-06-21 RX ORDER — ACETAMINOPHEN 325 MG/1
650 TABLET ORAL
Status: DISCONTINUED | OUTPATIENT
Start: 2018-06-21 | End: 2018-06-23 | Stop reason: HOSPADM

## 2018-06-21 RX ORDER — GABAPENTIN 300 MG/1
300 CAPSULE ORAL
Status: DISCONTINUED | OUTPATIENT
Start: 2018-06-21 | End: 2018-06-23 | Stop reason: HOSPADM

## 2018-06-21 RX ORDER — SODIUM CHLORIDE 0.9 % (FLUSH) 0.9 %
5-10 SYRINGE (ML) INJECTION AS NEEDED
Status: DISCONTINUED | OUTPATIENT
Start: 2018-06-21 | End: 2018-06-23 | Stop reason: HOSPADM

## 2018-06-21 RX ORDER — ALBUTEROL SULFATE 90 UG/1
2 AEROSOL, METERED RESPIRATORY (INHALATION)
Status: DISCONTINUED | OUTPATIENT
Start: 2018-06-21 | End: 2018-06-23 | Stop reason: HOSPADM

## 2018-06-21 RX ORDER — FLUTICASONE PROPIONATE 50 MCG
2 SPRAY, SUSPENSION (ML) NASAL DAILY
Status: DISCONTINUED | OUTPATIENT
Start: 2018-06-22 | End: 2018-06-23 | Stop reason: HOSPADM

## 2018-06-21 RX ORDER — ALBUTEROL SULFATE 90 UG/1
2 AEROSOL, METERED RESPIRATORY (INHALATION)
Status: DISCONTINUED | OUTPATIENT
Start: 2018-06-21 | End: 2018-06-21

## 2018-06-21 RX ORDER — VALSARTAN 80 MG/1
80 TABLET ORAL DAILY
Status: DISCONTINUED | OUTPATIENT
Start: 2018-06-22 | End: 2018-06-23 | Stop reason: HOSPADM

## 2018-06-21 RX ORDER — CETIRIZINE HCL 10 MG
10 TABLET ORAL DAILY
Status: DISCONTINUED | OUTPATIENT
Start: 2018-06-22 | End: 2018-06-23 | Stop reason: HOSPADM

## 2018-06-21 RX ORDER — HEPARIN SODIUM 5000 [USP'U]/ML
5000 INJECTION, SOLUTION INTRAVENOUS; SUBCUTANEOUS EVERY 8 HOURS
Status: DISCONTINUED | OUTPATIENT
Start: 2018-06-22 | End: 2018-06-23 | Stop reason: HOSPADM

## 2018-06-21 RX ORDER — DOCUSATE SODIUM 100 MG/1
100 CAPSULE, LIQUID FILLED ORAL
Status: DISCONTINUED | OUTPATIENT
Start: 2018-06-21 | End: 2018-06-23 | Stop reason: HOSPADM

## 2018-06-21 RX ADMIN — GABAPENTIN 300 MG: 300 CAPSULE ORAL at 22:14

## 2018-06-21 RX ADMIN — Medication 10 ML: at 22:37

## 2018-06-21 RX ADMIN — SODIUM CHLORIDE 50 ML/HR: 900 INJECTION, SOLUTION INTRAVENOUS at 22:37

## 2018-06-21 RX ADMIN — SODIUM CHLORIDE 1000 MG: 900 INJECTION, SOLUTION INTRAVENOUS at 23:31

## 2018-06-21 RX ADMIN — POTASSIUM CHLORIDE 40 MEQ: 750 TABLET, EXTENDED RELEASE ORAL at 22:36

## 2018-06-21 NOTE — IP AVS SNAPSHOT
[address] [facility] 
[phone] Patient: Chandler Arias MRN: [id number] :1975 About your hospitalization You were admitted on:  2018 You last received care in the:  [facility] 3 NEUROSCIENCE TELEMETRY You were discharged on:  2018 Why you were hospitalized Your primary diagnosis was:  Not on File Your diagnoses also included:  Acute Relapsing Multiple Sclerosis (Hcc) Follow-up Information Follow up With Details Comments Contact Info Patient's Own Medication is located in the Patient's:  Paulette Hopkins MD In 2 weeks  [address] [facility] [address] [facility] 
[phone] Your Scheduled Appointments   8:20 AM EDT ROUTINE CARE with Hang Hopkins MD  
[facility] at ED [facility] [address] [facility]) [facility] [address] [facility]  
[phone] Discharge Orders None A check vanessa indicates which time of day the medication should be taken. My Medications CONTINUE taking these medications Instructions Each Dose to Equal  
 Morning Noon Evening Bedtime * albuterol 90 mcg/actuation inhaler Commonly known as:  PROVENTIL HFA Your last dose was: Your next dose is: Take 2 Puffs by inhalation every four (4) hours as needed for Wheezing (and cough). 2 Puff * albuterol 2.5 mg /3 mL (0.083 %) nebulizer solution Commonly known as:  PROVENTIL VENTOLIN Your last dose was: Your next dose is:    
   
   
 3 mL by Nebulization route every four (4) hours as needed for Wheezing. 2.5 mg  
    
   
   
   
  
 AUBAGIO 14 mg Tab Generic drug:  teriflunomide Your last dose was: Your next dose is: butalbital-acetaminophen-caffeine -40 mg per tablet Commonly known as:  Rabia Black Your last dose was: Your next dose is: Take 1 Tab by mouth every six (6) hours as needed for Pain for up to 30 doses. Max 6 /24 hours 1 Tab  
    
   
   
   
  
 cetirizine 10 mg tablet Commonly known as:  ZYRTEC Your last dose was: Your next dose is:    
   
   
 TK 1 T PO ONCE DAILY  
     
   
   
   
  
 ergocalciferol 50,000 unit capsule Commonly known as:  ERGOCALCIFEROL Your last dose was: Your next dose is: Take 1 Cap by mouth every seven (7) days. [address] Units  
    
   
   
   
  
 fluticasone 50 mcg/actuation nasal spray Commonly known as:  Chaparro Calk Your last dose was: Your next dose is: 2 Sprays by Both Nostrils route daily. 2 Spray  
    
   
   
   
  
 fluticasone-salmeterol 250-50 mcg/dose diskus inhaler Commonly known as:  ADVAIR DISKUS Your last dose was: Your next dose is: Take 1 Puff by inhalation every twelve (12) hours. 1 Puff  
    
   
   
   
  
 gabapentin 300 mg capsule Commonly known as:  NEURONTIN Your last dose was: Your next dose is: Take 1 Cap by mouth nightly. 300 mg  
    
   
   
   
  
 hydroCHLOROthiazide 25 mg tablet Commonly known as:  HYDRODIURIL Your last dose was: Your next dose is: TAKE 1 TABLET BY MOUTH DAILY losartan 50 mg tablet Commonly known as:  COZAAR Your last dose was: Your next dose is: TAKE 1 TABLET BY MOUTH DAILY Nebulizer Accessories Kit Your last dose was: Your next dose is:    
   
   
 Per patient's needs. omeprazole 40 mg capsule Commonly known as:  PRILOSEC Your last dose was: Your next dose is: TAKE 1 CAPSULE BY MOUTH DAILY predniSONE 10 mg dose pack Commonly known as:  STERAPRED DS Your last dose was: Your next dose is:    
   
   
 See administration instruction per 10mg dose pack * Notice: This list has 2 medication(s) that are the same as other medications prescribed for you. Read the directions carefully, and ask your doctor or other care provider to review them with you. Where to Get Your Medications Information on where to get these meds will be given to you by the nurse or doctor. ! Ask your nurse or doctor about these medications  
  butalbital-acetaminophen-caffeine -40 mg per tablet Discharge Instructions None [facility] Announcement We are excited to announce that we are making your provider's discharge notes available to you in Apostrophe Apps. You will see these notes when they are completed and signed by the physician that discharged you from your recent hospital stay. If you have any questions or concerns about any information you see in Apostrophe Apps, please call the Health Information Department where you were seen or reach out to your Primary Care Provider for more information about your plan of care. Introducing hospitals & HEALTH SERVICES! Dear Nia Sparkle: Thank you for requesting a Apostrophe Apps account. Our records indicate that you already have an active Apostrophe Apps account. You can access your account anytime at [email or web address] Did you know that you can access your hospital and ER discharge instructions at any time in Apostrophe Apps? You can also review all of your test results from your hospital stay or ER visit. Additional Information If you have questions, please visit the Frequently Asked Questions section of the Apostrophe Apps website at [email or web address] Remember, Apostrophe Apps is NOT to be used for urgent needs.  For medical emergencies, dial 911. Now available from your iPhone and Android! Introducing Ken De Leon As a [facility] patient, I wanted to make you aware of our electronic visit tool called Ken De Leon. Musicplayr allows you to connect within minutes with a medical provider 24 hours a day, seven days a week via a mobile device or tablet or logging into a secure website from your computer. You can access Ken De Leon from anywhere in the [place]. A virtual visit might be right for you when you have a simple condition and feel like you just dont want to get out of bed, or cant get away from work for an appointment, when your regular [facility] provider is not available (evenings, weekends or holidays), or when youre out of town and need minor care. Electronic visits cost only $49 and if the Adype/DailyCred provider determines a prescription is needed to treat your condition, one can be electronically transmitted to a nearby pharmacy*. Please take a moment to enroll today if you have not already done so. The enrollment process is free and takes just a few minutes. To enroll, please download the Adype/DailyCred chandler to your tablet or phone, or visit [email or web address] to enroll on your computer. And, as an [address] [place] [address] patient with a Hivext [employer] account, the results of your visits will be scanned into your electronic medical record and your primary care provider will be able to view the scanned results. We urge you to continue to see your regular [facility] provider for your ongoing medical care. And while your primary care provider may not be the one available when you seek a Ken De Leon virtual visit, the peace of mind you get from getting a real diagnosis real time can be priceless. For more information on Ken De Leon, view our Frequently Asked Questions (FAQs) at [email or web address].  
 
Sincerely, 
 
Michelle Sun MD 
 Chief Medical Officer Hadley Gonzalez *:  certain medications cannot be prescribed via Ken De Leon Unresulted tests-please follow up with your PCP on these results Procedure/Test Authorizing Provider 2D ECHO COMPLETE ADULT (TTE) W OR WO CONTR Morgan Temple MD  
 CBC WITH AUTOMATED DIFF Mishell Elaine. Eloy Ross MD  
 CBC WITH AUTOMATED DIFF Morgan Temple MD  
 EKG, 12 LEAD, INITIAL Khari Larios DO  
 METABOLIC PANEL, BASIC Morgan Temple MD  
 METABOLIC PANEL, COMPREHENSIVE Rendell Elaine. Eloy Ross MD  
 [address] [facility], [place]  
  
Providers Seen During Your Hospitalization Provider Specialty Primary office phone Khari Larios DO Emergency Medicine [phone] Jackson Calabrese MD Hospitalist [phone] Your Primary Care Physician (PCP) Primary Care Physician Office Phone Office Fax Cristopher Castro [phone] You are allergic to the following No active allergies Recent Documentation Height Weight BMI OB Status Smoking Status 1.676 m 137.3 kg 48.86 kg/m2 Ablation Never Smoker Emergency Contacts Name Discharge Info Relation Home Work Mobile Francie Steven CAREGIVER [3] Friend [5] [phone] Yane Lopez DISCHARGE CAREGIVER [3] Other Relative [6] [phone] Patient Belongings The following personal items are in your possession at time of discharge: 
  Dental Appliances: None  Visual Aid: Contacts, With patient      Home Medications: None   Jewelry: Ring, Earrings, Bracelet, Necklace, With patient  Clothing: Dress, Undergarments, Footwear    Other Valuables: Cell Phone, Purse, Contact Lenses Please provide this summary of care documentation to your next provider. Signatures-by signing, you are acknowledging that this After Visit Summary has been reviewed with you and you have received a copy.   
  
 
  
    
    
 Patient Signature: ____________________________________________________________ Date:  ____________________________________________________________  
  
Kevin Terrance Provider Signature:  ____________________________________________________________ Date:  ____________________________________________________________

## 2018-06-22 LAB
ANION GAP SERPL CALC-SCNC: 10 MMOL/L (ref 5–15)
ATRIAL RATE: 65 BPM
BASOPHILS # BLD: 0.1 K/UL (ref 0–0.1)
BASOPHILS NFR BLD: 1 % (ref 0–1)
BUN SERPL-MCNC: 14 MG/DL (ref 6–20)
BUN/CREAT SERPL: 14 (ref 12–20)
CALCIUM SERPL-MCNC: 8.8 MG/DL (ref 8.5–10.1)
CALCULATED P AXIS, ECG09: 67 DEGREES
CALCULATED R AXIS, ECG10: 15 DEGREES
CALCULATED T AXIS, ECG11: 29 DEGREES
CHLORIDE SERPL-SCNC: 105 MMOL/L (ref 97–108)
CO2 SERPL-SCNC: 27 MMOL/L (ref 21–32)
CREAT SERPL-MCNC: 1 MG/DL (ref 0.55–1.02)
DIAGNOSIS, 93000: NORMAL
DIFFERENTIAL METHOD BLD: ABNORMAL
EOSINOPHIL # BLD: 0 K/UL (ref 0–0.4)
EOSINOPHIL NFR BLD: 0 % (ref 0–7)
ERYTHROCYTE [DISTWIDTH] IN BLOOD BY AUTOMATED COUNT: 16.9 % (ref 11.5–14.5)
GLUCOSE SERPL-MCNC: 131 MG/DL (ref 65–100)
HCT VFR BLD AUTO: 37.3 % (ref 35–47)
HGB BLD-MCNC: 11.8 G/DL (ref 11.5–16)
IMM GRANULOCYTES # BLD: 0 K/UL (ref 0–0.04)
IMM GRANULOCYTES NFR BLD AUTO: 0 % (ref 0–0.5)
LYMPHOCYTES # BLD: 1.3 K/UL (ref 0.8–3.5)
LYMPHOCYTES NFR BLD: 17 % (ref 12–49)
MCH RBC QN AUTO: 28.2 PG (ref 26–34)
MCHC RBC AUTO-ENTMCNC: 31.6 G/DL (ref 30–36.5)
MCV RBC AUTO: 89 FL (ref 80–99)
MONOCYTES # BLD: 0.2 K/UL (ref 0–1)
MONOCYTES NFR BLD: 3 % (ref 5–13)
NEUTS SEG # BLD: 6 K/UL (ref 1.8–8)
NEUTS SEG NFR BLD: 79 % (ref 32–75)
NRBC # BLD: 0 K/UL (ref 0–0.01)
NRBC BLD-RTO: 0 PER 100 WBC
P-R INTERVAL, ECG05: 150 MS
PLATELET # BLD AUTO: 371 K/UL (ref 150–400)
PMV BLD AUTO: 10 FL (ref 8.9–12.9)
POTASSIUM SERPL-SCNC: 3.8 MMOL/L (ref 3.5–5.1)
Q-T INTERVAL, ECG07: 418 MS
QRS DURATION, ECG06: 84 MS
QTC CALCULATION (BEZET), ECG08: 434 MS
RBC # BLD AUTO: 4.19 M/UL (ref 3.8–5.2)
SODIUM SERPL-SCNC: 142 MMOL/L (ref 136–145)
VENTRICULAR RATE, ECG03: 65 BPM
WBC # BLD AUTO: 7.6 K/UL (ref 3.6–11)

## 2018-06-22 PROCEDURE — 85025 COMPLETE CBC W/AUTO DIFF WBC: CPT | Performed by: INTERNAL MEDICINE

## 2018-06-22 PROCEDURE — 74011250637 HC RX REV CODE- 250/637: Performed by: INTERNAL MEDICINE

## 2018-06-22 PROCEDURE — 65660000000 HC RM CCU STEPDOWN

## 2018-06-22 PROCEDURE — 74011250637 HC RX REV CODE- 250/637: Performed by: HOSPITALIST

## 2018-06-22 PROCEDURE — 74011000258 HC RX REV CODE- 258: Performed by: INTERNAL MEDICINE

## 2018-06-22 PROCEDURE — 74011250636 HC RX REV CODE- 250/636: Performed by: INTERNAL MEDICINE

## 2018-06-22 PROCEDURE — 93306 TTE W/DOPPLER COMPLETE: CPT

## 2018-06-22 PROCEDURE — 80048 BASIC METABOLIC PNL TOTAL CA: CPT | Performed by: INTERNAL MEDICINE

## 2018-06-22 PROCEDURE — 36415 COLL VENOUS BLD VENIPUNCTURE: CPT | Performed by: INTERNAL MEDICINE

## 2018-06-22 RX ORDER — BUTALBITAL, ACETAMINOPHEN AND CAFFEINE 50; 325; 40 MG/1; MG/1; MG/1
1 TABLET ORAL ONCE
Status: COMPLETED | OUTPATIENT
Start: 2018-06-22 | End: 2018-06-22

## 2018-06-22 RX ORDER — BUTALBITAL, ACETAMINOPHEN AND CAFFEINE 50; 325; 40 MG/1; MG/1; MG/1
2 TABLET ORAL
Status: DISCONTINUED | OUTPATIENT
Start: 2018-06-22 | End: 2018-06-23 | Stop reason: HOSPADM

## 2018-06-22 RX ORDER — BUTALBITAL, ACETAMINOPHEN AND CAFFEINE 50; 325; 40 MG/1; MG/1; MG/1
1 TABLET ORAL
Status: DISCONTINUED | OUTPATIENT
Start: 2018-06-22 | End: 2018-06-22

## 2018-06-22 RX ADMIN — HYDROCHLOROTHIAZIDE 25 MG: 25 TABLET ORAL at 08:26

## 2018-06-22 RX ADMIN — BUTALBITAL, ACETAMINOPHEN AND CAFFEINE 2 TABLET: 50; 325; 40 TABLET ORAL at 22:06

## 2018-06-22 RX ADMIN — CETIRIZINE HYDROCHLORIDE 10 MG: 10 TABLET, FILM COATED ORAL at 08:26

## 2018-06-22 RX ADMIN — HEPARIN SODIUM 5000 UNITS: 5000 INJECTION, SOLUTION INTRAVENOUS; SUBCUTANEOUS at 21:22

## 2018-06-22 RX ADMIN — GABAPENTIN 300 MG: 300 CAPSULE ORAL at 21:22

## 2018-06-22 RX ADMIN — HEPARIN SODIUM 5000 UNITS: 5000 INJECTION, SOLUTION INTRAVENOUS; SUBCUTANEOUS at 05:28

## 2018-06-22 RX ADMIN — PANTOPRAZOLE SODIUM 40 MG: 40 TABLET, DELAYED RELEASE ORAL at 05:29

## 2018-06-22 RX ADMIN — Medication 10 ML: at 14:24

## 2018-06-22 RX ADMIN — Medication 10 ML: at 03:18

## 2018-06-22 RX ADMIN — Medication 10 ML: at 21:23

## 2018-06-22 RX ADMIN — SODIUM CHLORIDE 1000 MG: 900 INJECTION, SOLUTION INTRAVENOUS at 12:20

## 2018-06-22 RX ADMIN — BUTALBITAL, ACETAMINOPHEN AND CAFFEINE 1 TABLET: 50; 325; 40 TABLET ORAL at 14:48

## 2018-06-22 RX ADMIN — VALSARTAN 80 MG: 80 TABLET, FILM COATED ORAL at 08:26

## 2018-06-22 RX ADMIN — BUTALBITAL, ACETAMINOPHEN AND CAFFEINE 1 TABLET: 50; 325; 40 TABLET ORAL at 05:29

## 2018-06-22 RX ADMIN — HEPARIN SODIUM 5000 UNITS: 5000 INJECTION, SOLUTION INTRAVENOUS; SUBCUTANEOUS at 14:24

## 2018-06-22 RX ADMIN — ACETAMINOPHEN 650 MG: 325 TABLET ORAL at 03:16

## 2018-06-22 NOTE — PROGRESS NOTES
Pt is a 44 y.o  [place] female admitted with Acute relapsing MS. Pt was alert, oriented resting in bed. Demographic information verified and all is correct. Pt lives with her 3 children in a tri-level home. Pt's children are ages 25, 12 and 15. Prior to admission, pt was independent with her ADL's and IADL's. Pt drives and works for [employer]. Pt has a nebulizer at home. Pt had outpt PT yrs ago for her back. Pt uses the pharmacy at [address]. Pt's family can transport pt home by car. CM will continue to follow pt for discharge planning needs. Reason for Admission:   Acute Relapsing MS                   RRAT Score:      4               Plan for utilizing home health:      no                    Likelihood of Readmission:  low                         Transition of Care Plan:        Home with f/u appts    Care Management Interventions  PCP Verified by CM: Yes (Dr. Keily Cortez)  Mode of Transport at Discharge: Other (see comment) (pt's family will transport pt home by car)  Transition of Care Consult (CM Consult): Discharge Planning  Discharge Durable Medical Equipment: No  Physical Therapy Consult: No  Occupational Therapy Consult: Yes  Speech Therapy Consult: No  Current Support Network:  Other, Own Home (lives with her 3 children in a trilevel home with steps to the entrance)  Confirm Follow Up Transport: Family  Discharge Location  Discharge Placement: [address] [place], [address]

## 2018-06-22 NOTE — ROUTINE PROCESS
TRANSFER - IN REPORT:    Verbal report received from [facility] on [employer]  being received from ED for routine progression of care      Report consisted of patients Situation, Background, Assessment and   Recommendations(SBAR). Information from the following report(s) SBAR, Kardex, ED Summary, Procedure Summary, MAR, Recent Results and Cardiac Rhythm SR was reviewed with the receiving nurse. Opportunity for questions and clarification was provided. Assessment completed upon patients arrival to unit and care assumed.

## 2018-06-22 NOTE — PROGRESS NOTES
OT referral received, chart reviewed, and patient screened for OT needs. Spoke with patient who reports being at her independent baseline for ADLs and functional mobility. Nursing confirms patient report and stated that patient has been up ad kory in room. Patient further reports vision has improved, now only requiring additional time to focus at times without any further consistent blurred vision. No OT needs indicated at this time. Will complete order.

## 2018-06-22 NOTE — ED PROVIDER NOTES
EMERGENCY DEPARTMENT HISTORY AND PHYSICAL EXAM    Date: 6/21/2018  Patient Name: Bev Morris    History of Presenting Illness     Chief Complaint   Patient presents with    Dizziness     patient reports dizziness and spinning when she closes her eyes. symptoms since Friday. reports history of MS         History Provided By: Patient    Chief Complaint: Dizziness  Duration: 6 Days  Timing:  Gradual  Location: head  Quality: n/a  Severity: Moderate  Modifying Factors: walking, closing eyes makes symptoms worse  Associated Symptoms: denies any other associated signs or symptoms      HPI: Bev Morris is a 44 y.o. female with a PMH of MS, GERD, HTN, obesity who presents to the ER c/o dizziness. Patient states her symptoms began on Saturday and continue to worsen. She states she is being treated for her MS by Dr. Wesly Blum, [facility]. She reports her most recent MRI within the past few weeks and was advised there were 3 new lesions noted. She stated they anticipate an upcoming medication regimen change. She reports it feels like her head feels like it is swimming. Her symptoms worsen when she closes her eyes or attempts to ambulate. She reports feeling like she is losing her balance when she ambulates. She denied any current pain or other complaints. She denied any recent fevers, chills, and has no other symptoms or complaints. PCP: Veronica Lewis MD    Current Outpatient Prescriptions   Medication Sig Dispense Refill    hydroCHLOROthiazide (HYDRODIURIL) 25 mg tablet TAKE 1 TABLET BY MOUTH DAILY 90 Tab 1    omeprazole (PRILOSEC) 40 mg capsule TAKE 1 CAPSULE BY MOUTH DAILY 90 Cap 1    losartan (COZAAR) 50 mg tablet TAKE 1 TABLET BY MOUTH DAILY 90 Tab 1    gabapentin (NEURONTIN) 300 mg capsule Take 1 Cap by mouth nightly. 30 Cap 2    predniSONE (STERAPRED DS) 10 mg dose pack See administration instruction per 10mg dose pack 21 Tab 0    Nebulizer Accessories kit Per patient's needs.  1 Kit 0    albuterol (PROVENTIL HFA) 90 mcg/actuation inhaler Take 2 Puffs by inhalation every four (4) hours as needed for Wheezing (and cough). 1 Inhaler 2    albuterol (PROVENTIL VENTOLIN) 2.5 mg /3 mL (0.083 %) nebulizer solution 3 mL by Nebulization route every four (4) hours as needed for Wheezing. 200 Each 5    fluticasone-salmeterol (ADVAIR DISKUS) 250-50 mcg/dose diskus inhaler Take 1 Puff by inhalation every twelve (12) hours. 1 Each 2    ergocalciferol (ERGOCALCIFEROL) 50,000 unit capsule Take 1 Cap by mouth every seven (7) days. 12 Cap 1    fluticasone (FLONASE) 50 mcg/actuation nasal spray 2 Sprays by Both Nostrils route daily. 1 Bottle 2    AUBAGIO 14 mg tab       cetirizine (ZYRTEC) 10 mg tablet TK 1 T PO ONCE DAILY  1    butalbital-acetaminophen-caffeine (FIORICET, ESGIC) -40 mg per tablet Take 1 Tab by mouth every six (6) hours as needed for Pain. Max Daily Amount: 4 Tabs.  Max 6 /24 hours 30 Tab 1       Past History     Past Medical History:  Past Medical History:   Diagnosis Date    BMI 45.0-49.9, adult ([facility] 2018    Essential hypertension 2018    Gastroesophageal reflux disease without esophagitis 3/16/2017    H/O tubal ligation 3/16/2017    Iron deficiency anemia due to chronic blood loss 3/16/2017    Menorrhagia with regular cycle 4/3/2017    Migraine without aura and without status migrainosus, not intractable 3/16/2017    Mild intermittent asthma without complication     MS (multiple sclerosis) ([address] 10/08    using clinical trial medications    Obesity, Class III, BMI 40-49.9 (morbid obesity) ([facility] [address] 12/3/2015    S/P endometrial ablation 2018    Vitamin D deficiency 3/16/2017       Past Surgical History:  Past Surgical History:   Procedure Laterality Date    HX  SECTION      x3; , , &     LAP,TUBAL CAUTERY         Family History:  Family History   Problem Relation Age of Onset    Diabetes Mother     Hypertension Mother Social History:  Social History   Substance Use Topics    Smoking status: Never Smoker    Smokeless tobacco: Never Used    Alcohol use 1.0 oz/week     1 Glasses of wine, 1 Shots of liquor per week      Comment: socially       Allergies:  No Known Allergies      Review of Systems   Review of Systems   Constitutional: Negative for chills, fatigue and fever. HENT: Negative. Negative for sore throat. Eyes: Negative. Respiratory: Negative for cough and shortness of breath. Cardiovascular: Negative for chest pain and palpitations. Gastrointestinal: Negative for abdominal pain, nausea and vomiting. Genitourinary: Negative for dysuria. Musculoskeletal: Negative. Skin: Negative. Neurological: Positive for dizziness. Negative for weakness, light-headedness and headaches. Psychiatric/Behavioral: Negative. All other systems reviewed and are negative. Physical Exam     Vitals:    06/21/18 1652 06/21/18 2103   BP: (!) 161/96 (!) 152/102   Pulse: 78 64   Resp: 18 20   Temp: 98.2 °F (36.8 °C)    SpO2: 98% 98%   Weight: 137.3 kg (302 lb 11.1 oz)    Height: 5' 6\" (1.676 m)      Physical Exam   Constitutional: She is oriented to person, place, and time. She appears well-developed and well-nourished. No distress. HENT:   Head: Normocephalic and atraumatic. Mouth/Throat: Oropharynx is clear and moist.   Eyes: Conjunctivae are normal. Pupils are equal, round, and reactive to light. No scleral icterus. Right eye exhibits nystagmus. Left eye exhibits nystagmus. Neck: Neck supple. No JVD present. No tracheal deviation present. Cardiovascular: Normal rate, regular rhythm and normal heart sounds. Pulmonary/Chest: Effort normal and breath sounds normal. No respiratory distress. She has no wheezes. Abdominal: Soft. Bowel sounds are normal. She exhibits no distension. There is no tenderness. There is no rebound and no guarding. Musculoskeletal: Normal range of motion.    Neurological: She is alert and oriented to person, place, and time. She has normal strength. No cranial nerve deficit. She displays a negative Romberg sign. Coordination and gait normal. GCS eye subscore is 4. GCS verbal subscore is 5. GCS motor subscore is 6.   CN 2-12 grossly in tact   Skin: Skin is warm and dry. She is not diaphoretic. Psychiatric: She has a normal mood and affect. Nursing note and vitals reviewed. Diagnostic Study Results     Labs -     Recent Results (from the past 12 hour(s))   EKG, 12 LEAD, INITIAL    Collection Time: 06/21/18  4:59 PM   Result Value Ref Range    Ventricular Rate 65 BPM    Atrial Rate 65 BPM    P-R Interval 150 ms    QRS Duration 84 ms    Q-T Interval 418 ms    QTC Calculation (Bezet) 434 ms    Calculated P Axis 67 degrees    Calculated R Axis 15 degrees    Calculated T Axis 29 degrees    Diagnosis       Sinus rhythm with occasional premature ventricular complexes  No previous ECGs available     CBC WITH AUTOMATED DIFF    Collection Time: 06/21/18  6:28 PM   Result Value Ref Range    WBC 12.7 (H) 3.6 - 11.0 K/uL    RBC 4.22 3.80 - 5.20 M/uL    HGB 12.1 11.5 - 16.0 g/dL    HCT 37.9 35.0 - 47.0 %    MCV 89.8 80.0 - 99.0 FL    MCH 28.7 26.0 - 34.0 PG    MCHC 31.9 30.0 - 36.5 g/dL    RDW 16.9 (H) 11.5 - 14.5 %    PLATELET 851 235 - 440 K/uL    MPV 10.2 8.9 - 12.9 FL    NRBC 0.0 0  WBC    ABSOLUTE NRBC 0.00 0.00 - 0.01 K/uL    NEUTROPHILS 56 32 - 75 %    LYMPHOCYTES 33 12 - 49 %    MONOCYTES 9 5 - 13 %    EOSINOPHILS 1 0 - 7 %    BASOPHILS 1 0 - 1 %    IMMATURE GRANULOCYTES 0 0.0 - 0.5 %    ABS. NEUTROPHILS 7.0 1.8 - 8.0 K/UL    ABS. LYMPHOCYTES 4.2 (H) 0.8 - 3.5 K/UL    ABS. MONOCYTES 1.1 (H) 0.0 - 1.0 K/UL    ABS. EOSINOPHILS 0.1 0.0 - 0.4 K/UL    ABS. BASOPHILS 0.1 0.0 - 0.1 K/UL    ABS. IMM.  GRANS. 0.1 (H) 0.00 - 0.04 K/UL    DF AUTOMATED     METABOLIC PANEL, COMPREHENSIVE    Collection Time: 06/21/18  6:28 PM   Result Value Ref Range    Sodium 142 136 - 145 mmol/L Potassium 3.2 (L) 3.5 - 5.1 mmol/L    Chloride 102 97 - 108 mmol/L    CO2 33 (H) 21 - 32 mmol/L    Anion gap 7 5 - 15 mmol/L    Glucose 87 65 - 100 mg/dL    BUN 16 6 - 20 MG/DL    Creatinine 1.10 (H) 0.55 - 1.02 MG/DL    BUN/Creatinine ratio 15 12 - 20      GFR est AA >60 >60 ml/min/1.73m2    GFR est non-AA 54 (L) >60 ml/min/1.73m2    Calcium 9.1 8.5 - 10.1 MG/DL    Bilirubin, total 0.3 0.2 - 1.0 MG/DL    ALT (SGPT) 27 12 - 78 U/L    AST (SGOT) 14 (L) 15 - 37 U/L    Alk. phosphatase 51 45 - 117 U/L    Protein, total 7.3 6.4 - 8.2 g/dL    Albumin 3.7 3.5 - 5.0 g/dL    Globulin 3.6 2.0 - 4.0 g/dL    A-G Ratio 1.0 (L) 1.1 - 2.2         Radiologic Studies -   No orders to display     CT Results  (Last 48 hours)    None        CXR Results  (Last 48 hours)    None            Medical Decision Making   I am the first provider for this patient. I reviewed the vital signs, available nursing notes, past medical history, past surgical history, family history and social history. Vital Signs-Reviewed the patient's vital signs. Records Reviewed: Nursing Notes, Old Medical Records, Previous Radiology Studies and Previous Laboratory Studies    ED Course:   9:10 PM  43 y/o female c/o dizziness onset about 5-6 days ago and not improving. States she [facility] up with [facility]. Brady [facility] for her MS. Currently on regimen that may not be controlling her symptoms. States she had a recent MRI 1-2 weeks prior that shows 3 new lesions. Per pt, advised may be starting new med regimen but not until July. States symptoms worse with ambulation and when she closes her eyes. States it feels like her head is swimming. No recent falls or head trauma. Discussed pt with attending Dr. Jyoti Amin. Also spoke with Teleneurology, Dr. Arron Bird who advises pt needs admission for MRI w/ and w/o contrast to better assess lesions as well as possible steroid treatment to r/u MS flare vs. Labrynthitis.   Discussed pt with  Marlyn Haynes, hospitalist who accepts admission at this time. Pt made aware at this time. Ricky Yan PA-C      Disposition:  Admitted        Follow-up Information     None          Current Discharge Medication List          Provider Notes (Medical Decision Making):     Procedures:  Procedures        Diagnosis     Clinical Impression:   1. MS (multiple sclerosis) ([facility] [address]    2.  Dizziness

## 2018-06-22 NOTE — CONSULTS
DATE OF CONSULTATION: 6/22/2018    CONSULTED BY: Lynne Diaz MD    Chief Complaint   Patient presents with    Dizziness     patient reports dizziness and spinning when she closes her eyes. symptoms since Friday. reports history of MS       Reason for Consult  I have been asked to see the patient in neurological consultation to render advice and opinion regarding MS exacerbation. HISTORY OF PRESENT ILLNESS  Vicente Lagos is a 44 y.o. female who presents to the hospital because of MS exacerbation characterized by vertigo. She is a patient of Dr. Kerry Weeks, he did an MRI with contrast which according to patient showed 3 new lesions, 1 active, she is hospitalized for initiation of 3 days IV Solumedrol. She denies visual complaints, no weakness, no bowel or bladder c/o, she is on Aubagio po daily.     ROS  A ten system review of constitutional, cardiovascular, respiratory, musculoskeletal, endocrine, skin, SHEENT, genitourinary, psychiatric and neurologic systems was obtained and is unremarkable except as stated in HPI     PMH  Past Medical History:   Diagnosis Date    BMI 45.0-49.9, adult ([facility] [address] 2/14/2018    Essential hypertension 4/17/2018    Gastroesophageal reflux disease without esophagitis 3/16/2017    H/O tubal ligation 3/16/2017    Iron deficiency anemia due to chronic blood loss 3/16/2017    Menorrhagia with regular cycle 4/3/2017    Migraine without aura and without status migrainosus, not intractable 3/16/2017    Mild intermittent asthma without complication 5/16/6377    MS (multiple sclerosis) ([facility] [address] 10/08    using clinical trial medications    Obesity, Class III, BMI 40-49.9 (morbid obesity) ([facility] [address] 12/3/2015    S/P endometrial ablation 4/16/2018    Vitamin D deficiency 3/16/2017       FH  Family History   Problem Relation Age of Onset    Diabetes Mother     Hypertension Mother        SH  Social History     Social History    Marital status:      Spouse name: N/A    Number of children: N/A    Years of education: N/A     Social History Main Topics    Smoking status: Never Smoker    Smokeless tobacco: Never Used    Alcohol use 1.0 oz/week     1 Glasses of wine, 1 Shots of liquor per week      Comment: socially    Drug use: No    Sexual activity: Not Currently     Partners: Male     Birth control/ protection: Surgical     Other Topics Concern    Not on file     Social History Narrative       ALLERGIES  No Known Allergies    PHYSICAL EXAM  EXAMINATION:   Patient Vitals for the past 24 hrs:   Temp Pulse Resp BP SpO2   06/22/18 0736 97.5 °F (36.4 °C) (!) 59 18 140/88 94 %   06/22/18 0250 97.8 °F (36.6 °C) 62 18 122/77 100 %   06/21/18 2354 98.1 °F (36.7 °C) 61 18 125/86 98 %   06/21/18 2200 - (!) 58 16 (!) 132/93 98 %   06/21/18 2103 - 64 20 (!) 152/102 98 %   06/21/18 1652 98.2 °F (36.8 °C) 78 18 (!) 161/96 98 %        General:   Physical Exam   CONSTITUTIONAL: Oriented to person, place, and time, appears well-developed and well-nourished. No distress. Neurological Examination:   Mental Status:  AAO x3. Speech is fluent. Follows commands, has normal fund of knowledge, attention, short term recall, comprehension and insight. Cranial Nerves: Visual fields are full. PERRL, Extraocular movements are full. Facial sensation intact V1- V3. Facial movement intact, symmetric. Hearing intact to conversation. Palate elevates symmetrically. Shoulder shrug symmetric. Tongue midline. Motor: Strength is 5/5 in all 4 ext. Normal tone. No atrophy. Sensation: Normal to light touch    Reflexes: DTRs 2+ throughout. Plantar responses downgoing.      Coordination/Cerebellar: Intact to finger-nose-finger     Gait: NT      LAB DATA REVIEWED:    Results for orders placed or performed during the hospital encounter of 06/21/18   CBC WITH AUTOMATED DIFF   Result Value Ref Range    WBC 12.7 (H) 3.6 - 11.0 K/uL    RBC 4.22 3.80 - 5.20 M/uL    HGB 12.1 11.5 - 16.0 g/dL    HCT 37.9 35.0 - 47.0 % MCV 89.8 80.0 - 99.0 FL    MCH 28.7 26.0 - 34.0 PG    MCHC 31.9 30.0 - 36.5 g/dL    RDW 16.9 (H) 11.5 - 14.5 %    PLATELET 081 794 - 683 K/uL    MPV 10.2 8.9 - 12.9 FL    NRBC 0.0 0  WBC    ABSOLUTE NRBC 0.00 0.00 - 0.01 K/uL    NEUTROPHILS 56 32 - 75 %    LYMPHOCYTES 33 12 - 49 %    MONOCYTES 9 5 - 13 %    EOSINOPHILS 1 0 - 7 %    BASOPHILS 1 0 - 1 %    IMMATURE GRANULOCYTES 0 0.0 - 0.5 %    ABS. NEUTROPHILS 7.0 1.8 - 8.0 K/UL    ABS. LYMPHOCYTES 4.2 (H) 0.8 - 3.5 K/UL    ABS. MONOCYTES 1.1 (H) 0.0 - 1.0 K/UL    ABS. EOSINOPHILS 0.1 0.0 - 0.4 K/UL    ABS. BASOPHILS 0.1 0.0 - 0.1 K/UL    ABS. IMM. GRANS. 0.1 (H) 0.00 - 0.04 K/UL    DF AUTOMATED     METABOLIC PANEL, COMPREHENSIVE   Result Value Ref Range    Sodium 142 136 - 145 mmol/L    Potassium 3.2 (L) 3.5 - 5.1 mmol/L    Chloride 102 97 - 108 mmol/L    CO2 33 (H) 21 - 32 mmol/L    Anion gap 7 5 - 15 mmol/L    Glucose 87 65 - 100 mg/dL    BUN 16 6 - 20 MG/DL    Creatinine 1.10 (H) 0.55 - 1.02 MG/DL    BUN/Creatinine ratio 15 12 - 20      GFR est AA >60 >60 ml/min/1.73m2    GFR est non-AA 54 (L) >60 ml/min/1.73m2    Calcium 9.1 8.5 - 10.1 MG/DL    Bilirubin, total 0.3 0.2 - 1.0 MG/DL    ALT (SGPT) 27 12 - 78 U/L    AST (SGOT) 14 (L) 15 - 37 U/L    Alk.  phosphatase 51 45 - 117 U/L    Protein, total 7.3 6.4 - 8.2 g/dL    Albumin 3.7 3.5 - 5.0 g/dL    Globulin 3.6 2.0 - 4.0 g/dL    A-G Ratio 1.0 (L) 1.1 - 2.2     URINALYSIS W/ RFLX MICROSCOPIC   Result Value Ref Range    Color YELLOW/STRAW      Appearance CLOUDY (A) CLEAR      Specific gravity 1.022 1.003 - 1.030      pH (UA) 5.0 5.0 - 8.0      Protein NEGATIVE  NEG mg/dL    Glucose NEGATIVE  NEG mg/dL    Ketone NEGATIVE  NEG mg/dL    Bilirubin NEGATIVE  NEG      Blood NEGATIVE  NEG      Urobilinogen 0.2 0.2 - 1.0 EU/dL    Nitrites NEGATIVE  NEG      Leukocyte Esterase SMALL (A) NEG      WBC 5-10 0 - 4 /hpf    RBC 0-5 0 - 5 /hpf    Epithelial cells MODERATE (A) FEW /lpf    Bacteria NEGATIVE  NEG /hpf    Hyaline cast 2-5 0 - 5 /lpf   METABOLIC PANEL, BASIC   Result Value Ref Range    Sodium 142 136 - 145 mmol/L    Potassium 3.8 3.5 - 5.1 mmol/L    Chloride 105 97 - 108 mmol/L    CO2 27 21 - 32 mmol/L    Anion gap 10 5 - 15 mmol/L    Glucose 131 (H) 65 - 100 mg/dL    BUN 14 6 - 20 MG/DL    Creatinine 1.00 0.55 - 1.02 MG/DL    BUN/Creatinine ratio 14 12 - 20      GFR est AA >60 >60 ml/min/1.73m2    GFR est non-AA >60 >60 ml/min/1.73m2    Calcium 8.8 8.5 - 10.1 MG/DL   CBC WITH AUTOMATED DIFF   Result Value Ref Range    WBC 7.6 3.6 - 11.0 K/uL    RBC 4.19 3.80 - 5.20 M/uL    HGB 11.8 11.5 - 16.0 g/dL    HCT 37.3 35.0 - 47.0 %    MCV 89.0 80.0 - 99.0 FL    MCH 28.2 26.0 - 34.0 PG    MCHC 31.6 30.0 - 36.5 g/dL    RDW 16.9 (H) 11.5 - 14.5 %    PLATELET 360 726 - 549 K/uL    MPV 10.0 8.9 - 12.9 FL    NRBC 0.0 0  WBC    ABSOLUTE NRBC 0.00 0.00 - 0.01 K/uL    NEUTROPHILS 79 (H) 32 - 75 %    LYMPHOCYTES 17 12 - 49 %    MONOCYTES 3 (L) 5 - 13 %    EOSINOPHILS 0 0 - 7 %    BASOPHILS 1 0 - 1 %    IMMATURE GRANULOCYTES 0 0.0 - 0.5 %    ABS. NEUTROPHILS 6.0 1.8 - 8.0 K/UL    ABS. LYMPHOCYTES 1.3 0.8 - 3.5 K/UL    ABS. MONOCYTES 0.2 0.0 - 1.0 K/UL    ABS. EOSINOPHILS 0.0 0.0 - 0.4 K/UL    ABS. BASOPHILS 0.1 0.0 - 0.1 K/UL    ABS. IMM. GRANS. 0.0 0.00 - 0.04 K/UL    DF AUTOMATED     EKG, 12 LEAD, INITIAL   Result Value Ref Range    Ventricular Rate 65 BPM    Atrial Rate 65 BPM    P-R Interval 150 ms    QRS Duration 84 ms    Q-T Interval 418 ms    QTC Calculation (Bezet) 434 ms    Calculated P Axis 67 degrees    Calculated R Axis 15 degrees    Calculated T Axis 29 degrees    Diagnosis       Sinus rhythm with occasional premature ventricular complexes  No previous ECGs available          Imaging review:  MRI BRAIN  W/O 3/16/14    IMPRESSION:  Moderate white matter disease in a distribution consistent with multiple   sclerosis.  There is a new focus of white matter disease in the medial left   occipital lobe with enhancement suggesting active disease. A new area of   white matter disease is also present in the medial left temporal lobe, but   this area does not demonstrate enhancement nor diffusion restriction and is   likely more chronic. HOME MEDS  Prior to Admission Medications   Prescriptions Last Dose Informant Patient Reported? Taking? AUBAGIO 14 mg tab   Yes No   Nebulizer Accessories kit   No No   Sig: Per patient's needs. albuterol (PROVENTIL HFA) 90 mcg/actuation inhaler   No No   Sig: Take 2 Puffs by inhalation every four (4) hours as needed for Wheezing (and cough). albuterol (PROVENTIL VENTOLIN) 2.5 mg /3 mL (0.083 %) nebulizer solution   No No   Sig: 3 mL by Nebulization route every four (4) hours as needed for Wheezing. butalbital-acetaminophen-caffeine (FIORICET, ESGIC) -40 mg per tablet   No No   Sig: Take 1 Tab by mouth every six (6) hours as needed for Pain. Max Daily Amount: 4 Tabs. Max 6 /24 hours   cetirizine (ZYRTEC) 10 mg tablet   Yes No   Sig: TK 1 T PO ONCE DAILY   ergocalciferol (ERGOCALCIFEROL) 50,000 unit capsule   No No   Sig: Take 1 Cap by mouth every seven (7) days. fluticasone (FLONASE) 50 mcg/actuation nasal spray   No No   Si Sprays by Both Nostrils route daily. fluticasone-salmeterol (ADVAIR DISKUS) 250-50 mcg/dose diskus inhaler   No No   Sig: Take 1 Puff by inhalation every twelve (12) hours. gabapentin (NEURONTIN) 300 mg capsule   No No   Sig: Take 1 Cap by mouth nightly.    hydroCHLOROthiazide (HYDRODIURIL) 25 mg tablet   No No   Sig: TAKE 1 TABLET BY MOUTH DAILY   losartan (COZAAR) 50 mg tablet   No No   Sig: TAKE 1 TABLET BY MOUTH DAILY   omeprazole (PRILOSEC) 40 mg capsule   No No   Sig: TAKE 1 CAPSULE BY MOUTH DAILY   predniSONE (STERAPRED DS) 10 mg dose pack   No No   Sig: See administration instruction per 10mg dose pack      Facility-Administered Medications: None       CURRENT MEDS  Current Facility-Administered Medications   Medication Dose Route Frequency    cetirizine (ZYRTEC) tablet 10 mg  10 mg Oral DAILY    fluticasone (FLONASE) 50 mcg/actuation nasal spray 2 Spray  2 Spray Both Nostrils DAILY    fluticasone-vilanterol (BREO ELLIPTA) 100mcg-25mcg/puff  1 Puff Inhalation DAILY    gabapentin (NEURONTIN) capsule 300 mg  300 mg Oral QHS    hydroCHLOROthiazide (HYDRODIURIL) tablet 25 mg  25 mg Oral DAILY    valsartan (DIOVAN) tablet 80 mg  80 mg Oral DAILY    pantoprazole (PROTONIX) tablet 40 mg  40 mg Oral ACB    sodium chloride (NS) flush 5-10 mL  5-10 mL IntraVENous Q8H    heparin (porcine) injection 5,000 Units  5,000 Units SubCUTAneous Q8H    0.9% sodium chloride infusion  50 mL/hr IntraVENous CONTINUOUS    methylPREDNISolone ((Solu-MEDROL) 1,000 mg in 0.9% sodium chloride (MBP/ADV) 100 mL  1 g IntraVENous Q24H       IMPRESSION:RECOMMENDATIONS:  MS exacerbation, 3 days 1gm IV solumedrol, continue Aubagio 14mg daily, no need for imaging here. Mayito Encarnacion. Webster Yvonne JOE  Neurologist    This note will not be viewable in [address]

## 2018-06-22 NOTE — ROUTINE PROCESS
Bedside and Verbal shift change report given to [address]  (oncoming nurse) by Bita Pollard (offgoing nurse). Report included the following information SBAR, Kardex, Intake/Output and MAR.     Zone Phone:   [phone]      Significant changes during shift:  none        Patient Information    Anuradha Damon  44 y.o.  6/21/2018  6:54 PM by Kelly Morel MD. Anuradha Damon was admitted from Home    Problem List    Patient Active Problem List    Diagnosis Date Noted    Acute relapsing multiple sclerosis ([facility] 06/21/2018    Essential hypertension 04/17/2018    S/P endometrial ablation 04/16/2018    BMI 45.0-49.9, adult ([facility] 02/14/2018    Menorrhagia with regular cycle 04/03/2017    Mild intermittent asthma without complication 25/36/9758    Vitamin D deficiency 03/16/2017    Iron deficiency anemia due to chronic blood loss 03/16/2017    Migraine without aura and without status migrainosus, not intractable 03/16/2017    Family history of diabetes mellitus 03/16/2017    H/O tubal ligation 03/16/2017    Gastroesophageal reflux disease without esophagitis 03/16/2017    Obesity, Class III, BMI 40-49.9 (morbid obesity) ([facility] 12/03/2015    Heavy menstrual period 03/27/2013    Anemia 03/27/2013    Multiple sclerosis ([facility] 08/10/2011     Past Medical History:   Diagnosis Date    BMI 45.0-49.9, adult ([facility] 2/14/2018    Essential hypertension 4/17/2018    Gastroesophageal reflux disease without esophagitis 3/16/2017    H/O tubal ligation 3/16/2017    Iron deficiency anemia due to chronic blood loss 3/16/2017    Menorrhagia with regular cycle 4/3/2017    Migraine without aura and without status migrainosus, not intractable 3/16/2017    Mild intermittent asthma without complication 5/78/0508    MS (multiple sclerosis) ([facility] 10/08    using clinical trial medications    Obesity, Class III, BMI 40-49.9 (morbid obesity) ([facility] 12/3/2015    S/P endometrial ablation 4/16/2018    Vitamin D deficiency 3/16/2017 Activity Status:    OOB to Chair Yes  Ambulated this shift Yes   Bed Rest No        DVT prophylaxis:    DVT prophylaxis Med- Yes  DVT prophylaxis SCD or LUPE- No     Wounds: (If Applicable)    Wounds- No    Patient Safety:    Falls Score Total Score: 2  Safety Level_______  Bed Alarm On? No  Sitter?  No    Plan for upcoming shift: safety, monitor HA        Discharge Plan: Yes tomorrow after last solu-medrol dose    Active Consults:  IP CONSULT TO HOSPITALIST  IP CONSULT TO NEUROLOGY

## 2018-06-22 NOTE — H&P
Hospitalist Admission Note    NAME: Bev Morris   :  1975   MRN:  [id number]     Date/Time:  2018 10:02 PM    Patient PCP: Veronica Lewis MD  ________________________________________________________________________    My assessment of this patient's clinical condition and my plan of care is as follows. Assessment / Plan:  Vision changes with the dizziness concern for acute flareup of multiple sclerosis  ER physician contacted tele  neurologist on call who recommended MRI with 1 g IV Solu-Medrol  Patient [facility] a [facility] about a [facility] so [facility] She was told by her primary neurologist that she has 3 new lesions  Start Solu-Medrol 1 g daily  Consult occupational therapy to see if her dizziness could be related to BPPV and acute labyrinthitis  Resume home Aubgio  Consult neuro    Hypokalemia  Replaced with kcl and recheck in am    Hypertension  Resume home hydrochlorothiazide and losartan    Gastroesophageal reflux disease  Resume home omeprazole    History of asthma  Resume home inhalers    Morbid obesity        Code Status: full  Surrogate Decision Maker: Aunt Thania    DVT Prophylaxis: heparin  GI Prophylaxis: not indicated    Baseline: from home independent        Subjective:   CHIEF COMPLAINT: Dizziness and vision changes    HISTORY OF PRESENT ILLNESS:     This is a 60-year-old female with past medical history of hypertension, multiple sclerosis, obesity is coming to the hospital with chief complaints of dizziness and vision changes.  She reports that her symptoms started about a week ago and has been noticing dizziness which she describes as a spinning sensation especially when she closes her eyes.  She is also having vision changes which she describes as blurred vision and not able to see objects properly. Rosetta President was evaluated by her primary neurologist Dr. Wesly Blum about a week ago at [facility] and had an MRI which revealed 3 new lesions and she was able to be changed to new medications but her symptoms really got worse which prompted her to come to the emergency department. We were asked to admit for work up and evaluation of the above problems. Past Medical History:   Diagnosis Date    BMI 45.0-49.9, adult ([facility] 2018    Essential hypertension 2018    Gastroesophageal reflux disease without esophagitis 3/16/2017    H/O tubal ligation 3/16/2017    Iron deficiency anemia due to chronic blood loss 3/16/2017    Menorrhagia with regular cycle 4/3/2017    Migraine without aura and without status migrainosus, not intractable 3/16/2017    Mild intermittent asthma without complication     MS (multiple sclerosis) ([facility] 10/08    using clinical trial medications    Obesity, Class III, BMI 40-49.9 (morbid obesity) ([facility] 12/3/2015    S/P endometrial ablation 2018    Vitamin D deficiency 3/16/2017        Past Surgical History:   Procedure Laterality Date    HX  SECTION      x3; , , &     LAP,TUBAL CAUTERY         Social History   Substance Use Topics    Smoking status: Never Smoker    Smokeless tobacco: Never Used    Alcohol use 1.0 oz/week     1 Glasses of wine, 1 Shots of liquor per week      Comment: socially        Family History   Problem Relation Age of Onset    Diabetes Mother     Hypertension Mother      No Known Allergies     Prior to Admission medications    Medication Sig Start Date End Date Taking? Authorizing Provider   hydroCHLOROthiazide (HYDRODIURIL) 25 mg tablet TAKE 1 TABLET BY MOUTH DAILY 18   Antonio Herrera MD   omeprazole (PRILOSEC) 40 mg capsule TAKE 1 CAPSULE BY MOUTH DAILY 18   Antonio Herrera MD   losartan (COZAAR) 50 mg tablet TAKE 1 TABLET BY MOUTH DAILY 18   Antonio Herrera MD   gabapentin (NEURONTIN) 300 mg capsule Take 1 Cap by mouth nightly.  18   Antonio Herrera MD   predniSONE (STERAPRED DS) 10 mg dose pack See administration instruction per 10mg dose pack 4/16/18   Kadie Encinas MD   Nebulizer Accessories kit Per patient's needs. 3/19/18   Kadie Encinas MD   albuterol (PROVENTIL HFA) 90 mcg/actuation inhaler Take 2 Puffs by inhalation every four (4) hours as needed for Wheezing (and cough). 2/26/18   Kadie Encinas MD   albuterol (PROVENTIL VENTOLIN) 2.5 mg /3 mL (0.083 %) nebulizer solution 3 mL by Nebulization route every four (4) hours as needed for Wheezing. 2/26/18   Kadie Encinas MD   fluticasone-salmeterol (ADVAIR DISKUS) 250-50 mcg/dose diskus inhaler Take 1 Puff by inhalation every twelve (12) hours. 2/26/18   Kadie Encinas MD   ergocalciferol (ERGOCALCIFEROL) 50,000 unit capsule Take 1 Cap by mouth every seven (7) days. 2/14/18   Kadie Encinas MD   fluticasone (FLONASE) 50 mcg/actuation nasal spray 2 Sprays by Both Nostrils route daily. 2/14/18   Kadie Encinas MD   AUBAGIO 14 mg tab  3/7/17   Historical Provider   cetirizine (ZYRTEC) 10 mg tablet TK 1 T PO ONCE DAILY 12/8/16   Historical Provider   butalbital-acetaminophen-caffeine (FIORICET, ESGIC) -40 mg per tablet Take 1 Tab by mouth every six (6) hours as needed for Pain. Max Daily Amount: 4 Tabs. Max 6 /24 hours 7/30/15   Sol Amaya MD       REVIEW OF SYSTEMS:     I am not able to complete the review of systems because:    The patient is intubated and sedated    The patient has altered mental status due to his acute medical problems    The patient has baseline aphasia from prior stroke(s)    The patient has baseline dementia and is not reliable historian    The patient is in acute medical distress and unable to provide information           Total of 12 systems reviewed as follows:       POSITIVE= underlined text  Negative = text not underlined  General:  fever, chills, sweats, generalized weakness, weight loss/gain,      loss of appetite   Eyes:    blurred vision, eye pain, loss of vision, double vision  ENT:    rhinorrhea, pharyngitis   Respiratory:   cough, sputum production, SOB, BROWN, wheezing, pleuritic pain   Cardiology:   chest pain, palpitations, orthopnea, PND, edema, syncope   Gastrointestinal:  abdominal pain , N/V, diarrhea, dysphagia, constipation, bleeding   Genitourinary:  frequency, urgency, dysuria, hematuria, incontinence   Muskuloskeletal :  arthralgia, myalgia, back pain  Hematology:  easy bruising, nose or gum bleeding, lymphadenopathy   Dermatological: rash, ulceration, pruritis, color change / jaundice  Endocrine:   hot flashes or polydipsia   Neurological:  headache, dizziness, confusion, focal weakness, paresthesia,     Speech difficulties, memory loss, gait difficulty  Psychological: Feelings of anxiety, depression, agitation    Objective:   VITALS:    Visit Vitals    BP (!) 152/102 (BP 1 Location: Left arm, BP Patient Position: At rest)    Pulse 64    Temp 98.2 °F (36.8 °C)    Resp 20    Ht 5' 6\" (1.676 m)    Wt 137.3 kg (302 lb 11.1 oz)    SpO2 98%    BMI 48.86 kg/m2       PHYSICAL EXAM:    General:    Alert, cooperative, no distress, appears stated age. HEENT: Atraumatic, anicteric sclerae, pink conjunctivae     No oral ulcers, mucosa moist  Neck:  Supple, symmetrical,  thyroid: non tender  Lungs:   Clear to auscultation bilaterally. No Wheezing or Rhonchi. No rales. Chest wall:  No tenderness  No Accessory muscle use. Heart:   Regular  rhythm,  No  murmur   No edema  Abdomen:   Soft, non-tender. Not distended. Bowel sounds normal  Extremities: No cyanosis. No clubbing,      Skin turgor normal  Skin:     Not pale. Not Jaundiced  No rashes   Psych:  Not anxious or agitated. Neurologic: EOMs intact. No facial asymmetry. No aphasia or slurred speech. Symmetrical strength, Sensation grossly intact. Alert and oriented X 4.  Vision blurred and not able to properly read signs about 10 feet away    _______________________________________________________________________  Care Plan discussed with:    Comments   Patient y    [facility] RN y    Care Manager                    Consultant:      _______________________________________________________________________  Expected  Disposition:   Home with Family y   HH/PT/OT/RN    SNF/LTC    ONEL    ________________________________________________________________________  TOTAL TIME:  48  Minutes    Critical Care Provided     Minutes non procedure based      Comments    y Reviewed previous records   >50% of visit spent in counseling and coordination of care y Discussion with patient and/or family and questions answered       ________________________________________________________________________  Signed: Kurt Marti MD    Procedures: see electronic medical records for all procedures/Xrays and details which were not copied into this note but were reviewed prior to creation of Plan. LAB DATA REVIEWED:    Recent Results (from the past 24 hour(s))   EKG, 12 LEAD, INITIAL    Collection Time: 06/21/18  4:59 PM   Result Value Ref Range    Ventricular Rate 65 BPM    Atrial Rate 65 BPM    P-R Interval 150 ms    QRS Duration 84 ms    Q-T Interval 418 ms    QTC Calculation (Bezet) 434 ms    Calculated P Axis 67 degrees    Calculated R Axis 15 degrees    Calculated T Axis 29 degrees    Diagnosis       Sinus rhythm with occasional premature ventricular complexes  No previous ECGs available     CBC WITH AUTOMATED DIFF    Collection Time: 06/21/18  6:28 PM   Result Value Ref Range    WBC 12.7 (H) 3.6 - 11.0 K/uL    RBC 4.22 3.80 - 5.20 M/uL    HGB 12.1 11.5 - 16.0 g/dL    HCT 37.9 35.0 - 47.0 %    MCV 89.8 80.0 - 99.0 FL    MCH 28.7 26.0 - 34.0 PG    MCHC 31.9 30.0 - 36.5 g/dL    RDW 16.9 (H) 11.5 - 14.5 %    PLATELET 591 443 - 631 K/uL    MPV 10.2 8.9 - 12.9 FL    NRBC 0.0 0  WBC    ABSOLUTE NRBC 0.00 0.00 - 0.01 K/uL    NEUTROPHILS 56 32 - 75 %    LYMPHOCYTES 33 12 - 49 %    MONOCYTES 9 5 - 13 %    EOSINOPHILS 1 0 - 7 %    BASOPHILS 1 0 - 1 %    IMMATURE GRANULOCYTES 0 0.0 - 0.5 %    ABS.  NEUTROPHILS 7.0 1.8 - 8.0 K/UL    ABS. LYMPHOCYTES 4.2 (H) 0.8 - 3.5 K/UL    ABS. MONOCYTES 1.1 (H) 0.0 - 1.0 K/UL    ABS. EOSINOPHILS 0.1 0.0 - 0.4 K/UL    ABS. BASOPHILS 0.1 0.0 - 0.1 K/UL    ABS. IMM. GRANS. 0.1 (H) 0.00 - 0.04 K/UL    DF AUTOMATED     METABOLIC PANEL, COMPREHENSIVE    Collection Time: 06/21/18  6:28 PM   Result Value Ref Range    Sodium 142 136 - 145 mmol/L    Potassium 3.2 (L) 3.5 - 5.1 mmol/L    Chloride 102 97 - 108 mmol/L    CO2 33 (H) 21 - 32 mmol/L    Anion gap 7 5 - 15 mmol/L    Glucose 87 65 - 100 mg/dL    BUN 16 6 - 20 MG/DL    Creatinine 1.10 (H) 0.55 - 1.02 MG/DL    BUN/Creatinine ratio 15 12 - 20      GFR est AA >60 >60 ml/min/1.73m2    GFR est non-AA 54 (L) >60 ml/min/1.73m2    Calcium 9.1 8.5 - 10.1 MG/DL    Bilirubin, total 0.3 0.2 - 1.0 MG/DL    ALT (SGPT) 27 12 - 78 U/L    AST (SGOT) 14 (L) 15 - 37 U/L    Alk.  phosphatase 51 45 - 117 U/L    Protein, total 7.3 6.4 - 8.2 g/dL    Albumin 3.7 3.5 - 5.0 g/dL    Globulin 3.6 2.0 - 4.0 g/dL    A-G Ratio 1.0 (L) 1.1 - 2.2

## 2018-06-22 NOTE — ROUTINE PROCESS
Bedside and Verbal shift change report given to Sara RN (oncoming nurse) by Sandra Knowles nurseLydia.  Report included the following information SBAR, Kardex, Recent Results, Med Rec Status and Cardiac Rhythm SB-SR.      Zone Phone:   [phone]          Significant changes during shift:  New Admit    Patient Information      44 yr old, admitted on 18, patient of Dr. Mechelle Olvera, admitted from home.      Problem List          Past Medical History:   Diagnosis Date    BMI 45.0-49.9, adult ([facility] 2018    Essential hypertension 2018    Gastroesophageal reflux disease without esophagitis 3/16/2017    H/O tubal ligation 3/16/2017    Iron deficiency anemia due to chronic blood loss 3/16/2017    Menorrhagia with regular cycle 4/3/2017    Migraine without aura and without status migrainosus, not intractable 3/16/2017    Mild intermittent asthma without complication     MS (multiple sclerosis) ([facility] 10/08     using clinical trial medications    Obesity, Class III, BMI 40-49.9 (morbid obesity) ([facility] 12/3/2015    S/P endometrial ablation 2018    Vitamin D deficiency 3/16/2017               Past Surgical History:   Procedure Laterality Date    HX  SECTION         x3; , , & 2005   [address] [facility]                    Social History    Substance Use Topics     Smoking status: Never Smoker     Smokeless tobacco: Never Used     Alcohol use 1.0 oz/week       1 Glasses of wine, 1 Shots of liquor per week         Comment: socially                Family History   Problem Relation Age of Onset    Diabetes Mother      Hypertension Mother        No Known Allergies         Core Measures:      CVA: No  CHF: No  PNA: No            Activity Status:      OOB to Chair:  No  Ambulated this shift Yes  Bed Rest No      Supplemental C5: (IO Applicable)      Room air          LINES AND DRAINS:      PIV      DVT prophylaxis:      DVT prophylaxis Med- Yes  DVT prophylaxis SCD or LUPE- No       Wounds: (If Applicable)      Wounds- No    Location      Patient Safety:      Falls Score Total Score:  3  Safety Level_______  Bed Alarm On? No  Sitter?  No      Plan for upcoming shift: 2 D Echo, PT/OT,         Discharge Plan: TBD      Active Consults:  Neuro

## 2018-06-22 NOTE — PROGRESS NOTES
Hospitalist Progress Note    NAME: Cory Mercer   :  1975   MRN:  [id number]       Assessment / Plan:  MS acute flare up w/ visual changes and dizziness  Cont IV steroids  Neuro consult appreciated. Hypokalemia  Repleted. HTN  Cont meds    GERD  Cont PPI    Asthma  Stable    Morbid obesity - BMI 48.86    Plan:  As above. Dc home tomorrow after IV solumedrol. Body mass index is 48.86 kg/(m^2). Code status: Full  Prophylaxis: Hep SQ  Recommended Disposition: Home w/Family     Subjective:     Chief Complaint / Reason for Physician Visit  Follow up for dizziness. No complaints. No further dizziness. No cp/sob/abd pain. Feels much better. Review of Systems:  Symptom Y/N Comments  Symptom Y/N Comments   Fever/Chills    Chest Pain     Poor Appetite    Edema     Cough    Abdominal Pain     Sputum    Joint Pain     SOB/BROWN    Pruritis/Rash     Nausea/vomit    Tolerating PT/OT     Diarrhea    Tolerating Diet     Constipation    Other       Could NOT obtain due to:      Objective:     VITALS:   Last 24hrs VS reviewed since prior progress note. Most recent are:  Patient Vitals for the past 24 hrs:   Temp Pulse Resp BP SpO2   18 1128 97.7 °F (36.5 °C) 61 18 (!) 139/91 97 %   18 0736 97.5 °F (36.4 °C) (!) 59 18 140/88 94 %   18 0250 97.8 °F (36.6 °C) 62 18 122/77 100 %   18 2354 98.1 °F (36.7 °C) 61 18 125/86 98 %   18 2200 - (!) 58 16 (!) 132/93 98 %   18 2103 - 64 20 (!) 152/102 98 %   18 1652 98.2 °F (36.8 °C) 78 18 (!) 161/96 98 %     No intake or output data in the 24 hours ending 18 1245     PHYSICAL EXAM:  General: WD, WN. Alert, cooperative, no acute distress    EENT:  EOMI. Anicteric sclerae. MMM  Resp:  CTA bilaterally, no wheezing or rales.   No accessory muscle use  CV:  Regular  rhythm,  No edema  GI:  Soft, Non distended, Non tender.  +Bowel sounds  Neurologic:  Alert and oriented X 3, normal speech,   Psych:   Good insight. Not anxious nor agitated  Skin:  No rashes. No jaundice    Reviewed most current lab test results and cultures  YES  Reviewed most current radiology test results   YES  Review and summation of old records today    NO  Reviewed patient's current orders and MAR    YES  PMH/SH reviewed - no change compared to H&P  ________________________________________________________________________  Care Plan discussed with:    Comments   Patient x    Family      RN x    Care Manager     Consultant                        Multidiciplinary team rounds were held today with , nursing, pharmacist and clinical coordinator. Patient's plan of care was discussed; medications were reviewed and discharge planning was addressed. ________________________________________________________________________  Total NON critical care TIME:  25  Minutes    Total CRITICAL CARE TIME Spent:   Minutes non procedure based      Comments   >50% of visit spent in counseling and coordination of care     ________________________________________________________________________  Keily Wild MD     Procedures: see electronic medical records for all procedures/Xrays and details which were not copied into this note but were reviewed prior to creation of Plan. LABS:  I reviewed today's most current labs and imaging studies.   Pertinent labs include:  Recent Labs      06/22/18   0320  06/21/18   1828   WBC  7.6  12.7*   HGB  11.8  12.1   HCT  37.3  37.9   PLT  371  387     Recent Labs      06/22/18   0320  06/21/18   1828   NA  142  142   K  3.8  3.2*   CL  105  102   CO2  27  33*   GLU  131*  87   BUN  14  16   CREA  1.00  1.10*   CA  8.8  9.1   ALB   --   3.7   TBILI   --   0.3   SGOT   --   14*   ALT   --   27       Signed: Keily Wild MD

## 2018-06-23 VITALS
HEIGHT: 66 IN | OXYGEN SATURATION: 98 % | WEIGHT: 293 LBS | TEMPERATURE: 98 F | SYSTOLIC BLOOD PRESSURE: 141 MMHG | RESPIRATION RATE: 18 BRPM | DIASTOLIC BLOOD PRESSURE: 87 MMHG | BODY MASS INDEX: 47.09 KG/M2 | HEART RATE: 63 BPM

## 2018-06-23 PROCEDURE — 74011000258 HC RX REV CODE- 258: Performed by: INTERNAL MEDICINE

## 2018-06-23 PROCEDURE — 74011250637 HC RX REV CODE- 250/637: Performed by: INTERNAL MEDICINE

## 2018-06-23 PROCEDURE — 74011250636 HC RX REV CODE- 250/636: Performed by: INTERNAL MEDICINE

## 2018-06-23 RX ORDER — BUTALBITAL, ACETAMINOPHEN AND CAFFEINE 50; 325; 40 MG/1; MG/1; MG/1
1 TABLET ORAL
Qty: 30 TAB | Refills: 0 | Status: SHIPPED | OUTPATIENT
Start: 2018-06-23 | End: 2022-09-28 | Stop reason: SDUPTHER

## 2018-06-23 RX ORDER — MECLIZINE HYDROCHLORIDE 25 MG/1
25 TABLET ORAL
Qty: 30 TAB | Refills: 0 | Status: SHIPPED | OUTPATIENT
Start: 2018-06-23 | End: 2018-08-29 | Stop reason: ALTCHOICE

## 2018-06-23 RX ADMIN — VALSARTAN 80 MG: 80 TABLET, FILM COATED ORAL at 08:10

## 2018-06-23 RX ADMIN — CETIRIZINE HYDROCHLORIDE 10 MG: 10 TABLET, FILM COATED ORAL at 08:10

## 2018-06-23 RX ADMIN — HEPARIN SODIUM 5000 UNITS: 5000 INJECTION, SOLUTION INTRAVENOUS; SUBCUTANEOUS at 07:06

## 2018-06-23 RX ADMIN — SODIUM CHLORIDE 1000 MG: 900 INJECTION, SOLUTION INTRAVENOUS at 11:48

## 2018-06-23 RX ADMIN — BUTALBITAL, ACETAMINOPHEN AND CAFFEINE 2 TABLET: 50; 325; 40 TABLET ORAL at 08:13

## 2018-06-23 RX ADMIN — PANTOPRAZOLE SODIUM 40 MG: 40 TABLET, DELAYED RELEASE ORAL at 08:10

## 2018-06-23 RX ADMIN — Medication 10 ML: at 07:05

## 2018-06-23 RX ADMIN — HYDROCHLOROTHIAZIDE 25 MG: 25 TABLET ORAL at 08:10

## 2018-06-23 NOTE — ROUTINE PROCESS
Bedside and Verbal shift change report given to Sj Marmolejo RN (oncoming nurse) by Malena Valentino RN (offgoing nurse). Report included the following information SBAR, Kardex, Intake/Output and MAR.     Zone Phone:   [phone]      Significant changes during shift:  none        Patient Information    Bakari Villalta  44 y.o.  6/21/2018  6:54 PM by Romana Peek, MD. Bakari Villalta was admitted from Home    Problem List    Patient Active Problem List    Diagnosis Date Noted    Acute relapsing multiple sclerosis ([facility] 06/21/2018    Essential hypertension 04/17/2018    S/P endometrial ablation 04/16/2018    BMI 45.0-49.9, [facility] 02/14/2018    Menorrhagia with regular cycle 04/03/2017    Mild intermittent asthma without complication 10/21/2621    Vitamin D deficiency 03/16/2017    Iron deficiency anemia due to chronic blood loss 03/16/2017    Migraine without aura and without status migrainosus, not intractable 03/16/2017    Family history of diabetes mellitus 03/16/2017    H/O tubal ligation 03/16/2017    Gastroesophageal reflux disease without esophagitis 03/16/2017    Obesity, Class III, BMI 40-49.9 (morbid obesity) ([facility] 12/03/2015    Heavy menstrual period 03/27/2013    Anemia 03/27/2013    Multiple sclerosis ([facility] 08/10/2011     Past Medical History:   Diagnosis Date    BMI 45.0-49.9, adult ([facility] 2/14/2018    Essential hypertension 4/17/2018    Gastroesophageal reflux disease without esophagitis 3/16/2017    H/O tubal ligation 3/16/2017    Iron deficiency anemia due to chronic blood loss 3/16/2017    Menorrhagia with regular cycle 4/3/2017    Migraine without aura and without status migrainosus, not intractable 3/16/2017    Mild intermittent asthma without complication 9/97/5897    MS (multiple sclerosis) ([facility] 10/08    using clinical trial medications    Obesity, Class III, BMI 40-49.9 (morbid obesity) ([facility] 12/3/2015    S/P endometrial ablation 4/16/2018    Vitamin D deficiency 3/16/2017 Activity Status:    OOB to Chair Yes  Ambulated this shift Yes   Bed Rest No        DVT prophylaxis:    DVT prophylaxis Med- Yes  DVT prophylaxis SCD or LUPE- No     Wounds: (If Applicable)    Wounds- No    Patient Safety:    Falls Score Total Score: 1  Safety Level_______  Bed Alarm On? No  Sitter?  No    Plan for upcoming shift: D/C home after meds        Discharge Plan: Yes tomorrow after last solu-medrol dose    Active Consults:  IP CONSULT TO HOSPITALIST  IP CONSULT TO NEUROLOGY

## 2018-06-23 NOTE — PROGRESS NOTES
Neurology Progress Note    Patient ID:  Carleen Reyes  [id number]  69 y.o.  1975    Subjective:      Patient reports improvement with significant less dizziness and no vertiginous spell. Received two doses of 1000 mg IV Solumedrol. No new complaint. Unremarkable CBC and BMP except elevated glucose. Current Facility-Administered Medications   Medication Dose Route Frequency    butalbital-acetaminophen-caffeine (FIORICET, ESGIC) -40 mg per tablet 2 Tab  2 Tab Oral Q4H PRN    teriflunomide tab 14 mg  14 mg Oral QHS    cetirizine (ZYRTEC) tablet 10 mg  10 mg Oral DAILY    fluticasone (FLONASE) 50 mcg/actuation nasal spray 2 Spray  2 Spray Both Nostrils DAILY    fluticasone-vilanterol (BREO ELLIPTA) 100mcg-25mcg/puff  1 Puff Inhalation DAILY    gabapentin (NEURONTIN) capsule 300 mg  300 mg Oral QHS    hydroCHLOROthiazide (HYDRODIURIL) tablet 25 mg  25 mg Oral DAILY    valsartan (DIOVAN) tablet 80 mg  80 mg Oral DAILY    pantoprazole (PROTONIX) tablet 40 mg  40 mg Oral ACB    sodium chloride (NS) flush 5-10 mL  5-10 mL IntraVENous Q8H    sodium chloride (NS) flush 5-10 mL  5-10 mL IntraVENous PRN    acetaminophen (TYLENOL) tablet 650 mg  650 mg Oral Q6H PRN    ondansetron (ZOFRAN) injection 4 mg  4 mg IntraVENous Q6H PRN    docusate sodium (COLACE) capsule 100 mg  100 mg Oral DAILY PRN    heparin (porcine) injection 5,000 Units  5,000 Units SubCUTAneous Q8H    albuterol (PROVENTIL HFA, VENTOLIN HFA, PROAIR HFA) inhaler 2 Puff  2 Puff Inhalation Q4H PRN    methylPREDNISolone ((Solu-MEDROL) 1,000 mg in 0.9% sodium chloride (MBP/ADV) 100 mL  1 g IntraVENous Q24H        Review of Systems:    Pertinent items are noted in HPI. Objective:     Patient Vitals for the past 8 hrs:   BP Temp Pulse Resp SpO2   06/23/18 1120 141/87 98 °F (36.7 °C) 63 18 98 %   06/23/18 0743 138/86 98.1 °F (36.7 °C) 65 18 96 %              NEUROLOGICAL EXAM:    Appearance:   The patient is obese, provides a coherent history and is in no acute distress. Mental Status: Oriented to time, place and person. Fluent, no aphasia or dysarthria. Mood and affect appropriate. Cranial Nerves:   Intact visual fields. JESSENIA, EOM's full, no nystagmus, no ptosis. Facial sensation is normal. Corneal reflexes are intact. Facial movement is symmetric. Hearing is normal bilaterally. Palate is midline with normal sternocleidomastoid and trapezius muscles are normal. Tongue is midline. Motor:  5/5 strength    Reflexes:   Deep tendon reflexes 2+/4 and symmetrical.   Sensory:   Intact. Gait:  Steady. Mild truncal instability and slight problems tandem walking. No Romberg. Tremor:   No tremor noted. Cerebellar:  No cerebellar signs present. Neurovascular:  Normal heart sounds and regular rhythm, peripheral pulses intact, and no carotid bruits. Assessment:     Active Problems:    Acute relapsing multiple sclerosis ([facility] [address] (6/21/2018)        Plan:   Neurological exam only reveals mild truncal instability and slight problems with tandem walking. MS exacerbation. 3rd dose of Solumedrol 1000 mg IV to be given today for a total of 3 grams of Solumedrol. Patient has medrol dosepak at home. Advised to start tomorrow. Please provide patient with as needed Meclizine 25 mg every 6 hours for dizziness on discharge. Patient okay to be discharge after last dose of IV Solumedrol. Follow up with primary neurologist, Dr. King Damian.       Signed:  Steph Myers MD  6/23/2018  12:06 PM

## 2018-06-23 NOTE — PROGRESS NOTES
Discharge instructions/new prescriptions discussed with pt. All questions answered. Iv and tele removed. Pt left ambulatory with all personal belongings and accompanied by this RN.

## 2018-06-23 NOTE — DISCHARGE SUMMARY
Hospitalist Discharge Summary     Patient ID:  Magen Hicks  [id number]  62 y.o.  1975    PCP on record: Delmy Canchola MD    Admit date: 6/21/2018  Discharge date and time: 6/23/2018      DISCHARGE DIAGNOSIS:    MS acute flare up w/ visual changes and dizziness - resolved. Hypokalemia  HTN  GERD  Asthma  Morbid obesity - BMI 48.86  Migraine headaches, POA    CONSULTATIONS:  IP CONSULT TO HOSPITALIST  IP CONSULT TO NEUROLOGY    Excerpted HPI from H&P of Vishal Montelongo MD:    This is a 28-year-old female with past medical history of hypertension, multiple sclerosis, obesity is coming to the hospital with chief complaints of dizziness and vision changes.  She reports that her symptoms started about a week ago and has been noticing dizziness which she describes as a spinning sensation especially when she closes her eyes. She is also having vision changes which she describes as blurred vision and not able to see objects properly. Curry Aguiar was evaluated by her primary neurologist Dr. Jaquelin Sosa about a week ago at [address] [place] [address] and had an MRI which revealed 3 new lesions and she was able to be changed to new medications but her symptoms really got worse which prompted her to come to the emergency department.     We were asked to admit for work up and evaluation of the above problems. ______________________________________________________________________  DISCHARGE SUMMARY/HOSPITAL COURSE:  for full details see H&P, daily progress notes, labs, consult notes. Hospital course uneventful. Neurology consulted. Recommended IV steroids. Received IV solumedrol daily x 3 doses. Patient feeling much better following day after admission. She was stable and eager for dc home.  _______________________________________________________________________  Patient seen and examined by me on discharge day. Pertinent Findings:  Gen:    Not in distress  Chest: Clear lungs  CVS:   Regular rhythm.   No edema  Abd:  Soft, not distended, not tender  Neuro:  Alert, oriented.  _______________________________________________________________________  DISCHARGE MEDICATIONS:   Current Discharge Medication List      START taking these medications    Details   meclizine (ANTIVERT) 25 mg tablet Take 1 Tab by mouth every six (6) hours as needed for up to 30 doses. Qty: 30 Tab, Refills: 0         CONTINUE these medications which have CHANGED    Details   butalbital-acetaminophen-caffeine (FIORICET, ESGIC) -40 mg per tablet Take 1 Tab by mouth every six (6) hours as needed for Pain for up to 30 doses. Max 6 /24 hours  Qty: 30 Tab, Refills: 0    Associated Diagnoses: Back pain with radiation; Migraine without aura and without status migrainosus, not intractable; Other migraine without status migrainosus, intractable         CONTINUE these medications which have NOT CHANGED    Details   hydroCHLOROthiazide (HYDRODIURIL) 25 mg tablet TAKE 1 TABLET BY MOUTH DAILY  Qty: 90 Tab, Refills: 1    Associated Diagnoses: Essential hypertension      omeprazole (PRILOSEC) 40 mg capsule TAKE 1 CAPSULE BY MOUTH DAILY  Qty: 90 Cap, Refills: 1    Associated Diagnoses: Gastroesophageal reflux disease without esophagitis      losartan (COZAAR) 50 mg tablet TAKE 1 TABLET BY MOUTH DAILY  Qty: 90 Tab, Refills: 1    Associated Diagnoses: Essential hypertension      gabapentin (NEURONTIN) 300 mg capsule Take 1 Cap by mouth nightly. Qty: 30 Cap, Refills: 2    Associated Diagnoses: Foraminal stenosis of lumbar region      predniSONE (STERAPRED DS) 10 mg dose pack See administration instruction per 10mg dose pack  Qty: 21 Tab, Refills: 0    Associated Diagnoses: Foraminal stenosis of lumbar region      Nebulizer Accessories kit Per patient's needs.   Qty: 1 Kit, Refills: 0    Associated Diagnoses: Mild intermittent asthma without complication      albuterol (PROVENTIL HFA) 90 mcg/actuation inhaler Take 2 Puffs by inhalation every four (4) hours as needed for Wheezing (and cough). Qty: 1 Inhaler, Refills: 2      albuterol (PROVENTIL VENTOLIN) 2.5 mg /3 mL (0.083 %) nebulizer solution 3 mL by Nebulization route every four (4) hours as needed for Wheezing. Qty: 200 Each, Refills: 5      fluticasone-salmeterol (ADVAIR DISKUS) 250-50 mcg/dose diskus inhaler Take 1 Puff by inhalation every twelve (12) hours. Qty: 1 Each, Refills: 2      ergocalciferol (ERGOCALCIFEROL) 50,000 unit capsule Take 1 Cap by mouth every seven (7) days. Qty: 12 Cap, Refills: 1    Associated Diagnoses: Vitamin D deficiency      fluticasone (FLONASE) 50 mcg/actuation nasal spray 2 Sprays by Both Nostrils route daily. Qty: 1 Bottle, Refills: 2    Associated Diagnoses: Acute non-recurrent maxillary sinusitis      AUBAGIO 14 mg tab       cetirizine (ZYRTEC) 10 mg tablet TK 1 T PO ONCE DAILY  Refills: 1             My Recommended Diet, Activity, Wound Care, and follow-up labs are listed in the patient's Discharge Insturctions which I have personally completed and reviewed.     _______________________________________________________________________  DISPOSITION:    Home with Family: x   Home with HH/PT/OT/RN:    SNF/LTC:    ONEL:    OTHER:        Condition at Discharge:  Stable  _______________________________________________________________________  Follow up with:   PCP : Antonio Herrera MD  Follow-up Information     Follow up With Details Comments Contact Info    Patient's Own Medication is located in the Patient's:  [address] [place] [address] Rickie Babin MD In 2 weeks  [facility] [phone] [facility] Rajesh  [phone]                Total time in minutes spent coordinating this discharge (includes going over instructions, follow-up, prescriptions, and preparing report for sign off to her PCP) :  25  minutes    Signed:  Rossi Haynes MD

## 2018-07-27 RX ORDER — CETIRIZINE HCL 10 MG
TABLET ORAL
Qty: 30 TAB | Refills: 3 | Status: SHIPPED | OUTPATIENT
Start: 2018-07-27 | End: 2019-01-08 | Stop reason: SDUPTHER

## 2018-08-01 DIAGNOSIS — E55.9 VITAMIN D DEFICIENCY: ICD-10-CM

## 2018-08-01 RX ORDER — ERGOCALCIFEROL 1.25 MG/1
50000 CAPSULE ORAL
Qty: 12 CAP | Refills: 1 | Status: SHIPPED | OUTPATIENT
Start: 2018-08-01 | End: 2019-01-22 | Stop reason: SDUPTHER

## 2018-08-29 ENCOUNTER — OFFICE VISIT (OUTPATIENT)
Dept: FAMILY MEDICINE CLINIC | Age: 43
End: 2018-08-29

## 2018-08-29 VITALS
HEIGHT: 66 IN | RESPIRATION RATE: 18 BRPM | OXYGEN SATURATION: 97 % | TEMPERATURE: 98.1 F | WEIGHT: 293 LBS | DIASTOLIC BLOOD PRESSURE: 89 MMHG | SYSTOLIC BLOOD PRESSURE: 136 MMHG | BODY MASS INDEX: 47.09 KG/M2 | HEART RATE: 70 BPM

## 2018-08-29 DIAGNOSIS — I10 ESSENTIAL HYPERTENSION: Primary | ICD-10-CM

## 2018-08-29 DIAGNOSIS — B35.1 ONYCHOMYCOSIS: ICD-10-CM

## 2018-08-29 PROBLEM — Z83.3 FAMILY HISTORY OF DIABETES MELLITUS: Status: RESOLVED | Noted: 2017-03-16 | Resolved: 2018-08-29

## 2018-08-29 NOTE — MR AVS SNAPSHOT
[address] [facility] 
[phone] Patient: Matti Billy MRN: [id number] :1975 Visit Information Date & Time Provider Department Dept. Phone Encounter #  
 2018  8:40 AM Kayleigh Dean MD [facility] at [address] [id number] Follow-up Instructions Return in about 4 months (around 2018) for HTN. Upcoming Health Maintenance Date Due DTaP/Tdap/Td series (1 - Tdap) 3/31/2017 Influenza Age 5 to Adult 2018 PAP AKA CERVICAL CYTOLOGY 3/21/2020 Allergies as of 2018  Review Complete On: 2018 By: Kayleigh Dean MD  
 No Known Allergies Current Immunizations  Reviewed on 2018 Name Date Influenza Vaccine 10/1/2017 Influenza Vaccine Intradermal PF 10/9/2015 Td, Adsorbed PF 3/30/2017 Not reviewed this visit You Were Diagnosed With   
  
 Codes Comments Essential hypertension    -  Primary ICD-10-CM: I10 
ICD-9-CM: 401.9 Vitals BP Pulse Temp Resp Height(growth percentile) Weight(growth percentile) 136/89 (BP 1 Location: Left arm, BP Patient Position: Sitting) 70 98.1 °F (36.7 °C) (Oral) 18 5' 6\" (1.676 m) 305 lb (138.3 kg) SpO2 BMI OB Status Smoking Status 97% 49.23 kg/m2 Ablation Never Smoker BMI and BSA Data Body Mass Index Body Surface Area  
 49.23 kg/m 2 2.54 m 2 Preferred Pharmacy Pharmacy Name Phone [facility] AT [address] [place] [address] AT [address] [place] [address] [phone] Your Updated Medication List  
  
   
This list is accurate as of 18  9:05 AM.  Always use your most recent med list.  
  
  
  
  
 * albuterol 90 mcg/actuation inhaler Commonly known as:  PROVENTIL HFA Take 2 Puffs by inhalation every four (4) hours as needed for Wheezing (and cough). * albuterol 2.5 mg /3 mL (0.083 %) nebulizer solution Commonly known as:  PROVENTIL VENTOLIN  
3 mL by Nebulization route every four (4) hours as needed for Wheezing. butalbital-acetaminophen-caffeine -40 mg per tablet Commonly known as:  Donald Keyes Take 1 Tab by mouth every six (6) hours as needed for Pain for up to 30 doses. Max 6 /24 hours  
  
 cetirizine 10 mg tablet Commonly known as:  ZYRTEC TK 1 T PO ONCE DAILY  
  
 ergocalciferol 50,000 unit capsule Commonly known as:  ERGOCALCIFEROL Take 1 Cap by mouth every seven (7) days. fluticasone 50 mcg/actuation nasal spray Commonly known as:  [place] 2 Sprays by Both Nostrils route daily. fluticasone-salmeterol 250-50 mcg/dose diskus inhaler Commonly known as:  ADVAIR DISKUS Take 1 Puff by inhalation every twelve (12) hours. gabapentin 300 mg capsule Commonly known as:  NEURONTIN Take 1 Cap by mouth nightly. hydroCHLOROthiazide 25 mg tablet Commonly known as:  HYDRODIURIL  
TAKE 1 TABLET BY MOUTH DAILY losartan 50 mg tablet Commonly known as:  COZAAR  
TAKE 1 TABLET BY MOUTH DAILY Nebulizer Accessories Kit Per patient's needs. omeprazole 40 mg capsule Commonly known as:  PRILOSEC  
TAKE 1 CAPSULE BY MOUTH DAILY  
  
 TYSABRI 300 mg/15 mL injection Generic drug:  natalizumab  
300 mg by IntraVENous route once. * Notice: This list has 2 medication(s) that are the same as other medications prescribed for you. Read the directions carefully, and ask your doctor or other care provider to review them with you. We Performed the Following METABOLIC PANEL, COMPREHENSIVE [69168 CPT(R)] Follow-up Instructions Return in about 4 months (around 12/29/2018) for HTN. Introducing [facility]! Dear Laron Estrada: Thank you for requesting a [employer] account.   Our records indicate that you already have an active Tray account. You can access your account anytime at [email or web address] [email or web address] Did you know that you can access your hospital and ER discharge instructions at any time in Tray? You can also review all of your test results from your hospital stay or ER visit. Additional Information If you have questions, please visit the Frequently Asked Questions section of the Tray website at [email or web address] Remember, Tray is NOT to be used for urgent needs. For medical emergencies, dial 911. Now available from your iPhone and Android! Please provide this summary of care documentation to your next provider. Your primary care clinician is listed as Talita Araiza. If you have any questions after today's visit, please call [phone].

## 2018-08-29 NOTE — PROGRESS NOTES
Dharmesh Sim is a 44 y.o. female 
 
 has a past medical history of BMI 45.0-49.9, adult ([facility] [address] (2/14/2018); Essential hypertension (4/17/2018); Gastroesophageal reflux disease without esophagitis (3/16/2017); H/O tubal ligation (3/16/2017); Iron deficiency anemia due to chronic blood loss (3/16/2017); Menorrhagia with regular cycle (4/3/2017); Migraine without aura and without status migrainosus, not intractable (3/16/2017); Mild intermittent asthma without complication (2/22/4209); MS [facility] (10/08); Obesity, Class III, BMI 40-49.9 (morbid obesity) ([facility] (12/3/2015); S/P endometrial ablation (4/16/2018); and Vitamin D deficiency (3/16/2017). Just came home from a cruise, have been drinking a lot on there. HTN: BP at goal. Continue course, labs monitoring MS managed by Neuro at [facility] Onychomycosis bilat great toe nails. Reviewed: active problem list, medication list, allergies, notes from last encounter A comprehensive review of systems was negative except for that written in the HPI. No Known Allergies Current Outpatient Prescriptions on File Prior to Visit Medication Sig Dispense Refill  ergocalciferol (ERGOCALCIFEROL) 50,000 unit capsule Take 1 Cap by mouth every seven (7) days. 12 Cap 1  cetirizine (ZYRTEC) 10 mg tablet TK 1 T PO ONCE DAILY 30 Tab 3  
 hydroCHLOROthiazide (HYDRODIURIL) 25 mg tablet TAKE 1 TABLET BY MOUTH DAILY 90 Tab 1  
 omeprazole (PRILOSEC) 40 mg capsule TAKE 1 CAPSULE BY MOUTH DAILY 90 Cap 1  
 losartan (COZAAR) 50 mg tablet TAKE 1 TABLET BY MOUTH DAILY 90 Tab 1  
 gabapentin (NEURONTIN) 300 mg capsule Take 1 Cap by mouth nightly. 30 Cap 2  
 Nebulizer Accessories kit Per patient's needs. 1 Kit 0  
 albuterol (PROVENTIL HFA) 90 mcg/actuation inhaler Take 2 Puffs by inhalation every four (4) hours as needed for Wheezing (and cough).  1 Inhaler 2  
 albuterol (PROVENTIL VENTOLIN) 2.5 mg /3 mL (0.083 %) nebulizer solution 3 mL by Nebulization route every four (4) hours as needed for Wheezing. 200 Each 5  
 fluticasone (FLONASE) 50 mcg/actuation nasal spray 2 Sprays by Both Nostrils route daily. 1 Bottle 2  
 butalbital-acetaminophen-caffeine (FIORICET, ESGIC) -40 mg per tablet Take 1 Tab by mouth every six (6) hours as needed for Pain for up to 30 doses. Max 6 /24 hours 30 Tab 0  
 fluticasone-salmeterol (ADVAIR DISKUS) 250-50 mcg/dose diskus inhaler Take 1 Puff by inhalation every twelve (12) hours. 1 Each 2 No current facility-administered medications on file prior to visit. Patient Active Problem List  
Diagnosis Code  Multiple sclerosis ([facility]) G35  
 Heavy menstrual period N92.0  Anemia D64.9  Obesity, Class III, BMI 40-49.9 (morbid obesity) ([facility]) E66.01  
 Mild intermittent asthma without complication M89.77  Vitamin D deficiency E55.9  Iron deficiency anemia due to chronic blood loss D50.0  Migraine without aura and without status migrainosus, not intractable G43.009  
 H/O tubal ligation Z98.51  
 Gastroesophageal reflux disease without esophagitis K21.9  Menorrhagia with regular cycle N92.0  BMI 45.0-49.9, adult ([facility]) Z68.42  
 S/P endometrial ablation Z98.890  
 Essential hypertension I10  
 Acute relapsing [facility] G35 Visit Vitals  /89 (BP 1 Location: Left arm, BP Patient Position: Sitting)  Pulse 70  Temp 98.1 °F (36.7 °C) (Oral)  Resp 18  Ht 5' 6\" (1.676 m)  Wt 305 lb (138.3 kg)  SpO2 97%  BMI 49.23 kg/m2 General appearance: alert, cooperative, no distress, appears stated age Neurologic: Alert and oriented X 3, normal strength and tone, symmetric. Normal without focal findings. Cranial nerves 2-12 intact. Normal coordination and gait. Mental status: Alert, oriented, thought content appropriate, affect: stable, mood-congruent. Head: Normocephalic, without obvious abnormality, atraumatic Eyes: conjunctivae/corneas clear. PERRL, EOM's intact. Neck: supple, symmetrical, trachea midline, no JVD Lungs: clear to auscultation bilaterally Heart: regular rate and rhythm, S1, S2 normal, no murmur, click, rub or gallop Abdomen: soft, non-tender. Extremities: extremities normal, atraumatic, no cyanosis or edema Onychomycosis bilat great toes Assessment/Plans: 
 
Diagnoses and all orders for this visit: 1. Essential hypertension -     METABOLIC PANEL, COMPREHENSIVE 2. Onychomycosis -     REFERRAL TO PODIATRY Discussed plans, risk/benefits of treatments/observations. Through the use of shared decision making, above plans were agreed upon. Medication compliance advised. Patient verbalized understanding. Follow-up Disposition: 
Return in about 4 months (around 12/29/2018) for HTN. Latrice Hung MD 
8/29/2018

## 2018-08-29 NOTE — PROGRESS NOTES
Chief Complaint Patient presents with  Hypertension 4 mo check 1. Have you been to the ER, urgent care clinic since your last visit? Hospitalized since your last visit? Yes [address] Have you seen or consulted any other health care providers outside of the [address] [place] [address] since your last visit? Include any pap smears or colon screening. Yes Dr. Nash Zhong

## 2019-01-04 ENCOUNTER — OFFICE VISIT (OUTPATIENT)
Dept: FAMILY MEDICINE CLINIC | Age: 44
End: 2019-01-04

## 2019-01-04 VITALS
HEIGHT: 66 IN | SYSTOLIC BLOOD PRESSURE: 124 MMHG | BODY MASS INDEX: 47.09 KG/M2 | WEIGHT: 293 LBS | HEART RATE: 73 BPM | DIASTOLIC BLOOD PRESSURE: 83 MMHG | TEMPERATURE: 100.3 F | RESPIRATION RATE: 18 BRPM | OXYGEN SATURATION: 93 %

## 2019-01-04 DIAGNOSIS — D50.9 IRON DEFICIENCY ANEMIA, UNSPECIFIED IRON DEFICIENCY ANEMIA TYPE: ICD-10-CM

## 2019-01-04 DIAGNOSIS — I10 ESSENTIAL HYPERTENSION: ICD-10-CM

## 2019-01-04 DIAGNOSIS — J06.9 ACUTE URI: ICD-10-CM

## 2019-01-04 DIAGNOSIS — Z79.899 ENCOUNTER FOR LONG-TERM (CURRENT) USE OF MEDICATIONS: ICD-10-CM

## 2019-01-04 DIAGNOSIS — J09.X2 INFLUENZA A (H5N1): Primary | ICD-10-CM

## 2019-01-04 LAB
QUICKVUE INFLUENZA TEST: POSITIVE
VALID INTERNAL CONTROL?: YES

## 2019-01-04 RX ORDER — OSELTAMIVIR PHOSPHATE 75 MG/1
75 CAPSULE ORAL 2 TIMES DAILY
Qty: 10 CAP | Refills: 0 | Status: SHIPPED | OUTPATIENT
Start: 2019-01-04 | End: 2019-01-04 | Stop reason: SDUPTHER

## 2019-01-04 RX ORDER — OSELTAMIVIR PHOSPHATE 75 MG/1
75 CAPSULE ORAL 2 TIMES DAILY
Qty: 10 CAP | Refills: 0 | Status: SHIPPED | OUTPATIENT
Start: 2019-01-04 | End: 2019-01-09

## 2019-01-04 NOTE — PROGRESS NOTES
Chief Complaint Patient presents with  Hypertension  Cold Symptoms 4 mo check. Sinus infection, stopped up head & cough. 1. Have you been to the ER, urgent care clinic since your last visit? Hospitalized since your last visit? no 
 
2. Have you seen or consulted any other health care providers outside of the [address] [place] [address] since your last visit? Include any pap smears or colon screening. yes

## 2019-01-04 NOTE — LETTER
NOTIFICATION RETURN TO WORK / SCHOOL 
 
1/4/2019 2:09 PM 
 
Ms. Lakeisha Cam [address] To Whom It May Concern: 
 
Lakeisha Cam is currently under the care of Giovanna Galdamez. She will return to work/school on: 01/03 to 01/14/2019 If there are questions or concerns please have the patient contact our office. Sincerely, Elen Figueroa MD

## 2019-01-04 NOTE — PROGRESS NOTES
Deanna Shah is a 44 y.o. female 
 
 has a past medical history of BMI 45.0-49.9, adult ([facility] Essential hypertension, Gastroesophageal reflux disease without esophagitis, H/O tubal ligation, Iron deficiency anemia due to chronic blood loss, Menorrhagia with regular cycle, Migraine without aura and without status migrainosus, not intractable, Mild intermittent asthma without complication, MS (multiple sclerosis) ([facility] Obesity, Class III, BMI 40-49.9 (morbid obesity) ([facility] S/P endometrial ablation, and Vitamin D deficiency. Illness X day 2. Quick onset. Fever 100.3F here. Myalgia, uri symptoms. HTN: BP at goal. Continue course, labs monitoring MS managed by Neuro at [facility] Onychomycosis bilat great toe nails. Reviewed: active problem list, medication list, allergies, notes from last encounter A comprehensive review of systems was negative except for that written in the HPI. No Known Allergies Current Outpatient Medications on File Prior to Visit Medication Sig Dispense Refill  ergocalciferol (ERGOCALCIFEROL) 50,000 unit capsule Take 1 Cap by mouth every seven (7) days. 12 Cap 1  
 hydroCHLOROthiazide (HYDRODIURIL) 25 mg tablet TAKE 1 TABLET BY MOUTH DAILY 90 Tab 1  
 omeprazole (PRILOSEC) 40 mg capsule TAKE 1 CAPSULE BY MOUTH DAILY 90 Cap 1  
 losartan (COZAAR) 50 mg tablet TAKE 1 TABLET BY MOUTH DAILY 90 Tab 1  Nebulizer Accessories kit Per patient's needs. 1 Kit 0  
 albuterol (PROVENTIL HFA) 90 mcg/actuation inhaler Take 2 Puffs by inhalation every four (4) hours as needed for Wheezing (and cough). 1 Inhaler 2  
 albuterol (PROVENTIL VENTOLIN) 2.5 mg /3 mL (0.083 %) nebulizer solution 3 mL by Nebulization route every four (4) hours as needed for Wheezing. 200 Each 5  
 fluticasone-salmeterol (ADVAIR DISKUS) 250-50 mcg/dose diskus inhaler Take 1 Puff by inhalation every twelve (12) hours.  1 Each 2  
  fluticasone (FLONASE) 50 mcg/actuation nasal spray 2 Sprays by Both Nostrils route daily. 1 Bottle 2  
 natalizumab (TYSABRI) 300 mg/15 mL injection 300 mg by IntraVENous route once.  butalbital-acetaminophen-caffeine (FIORICET, ESGIC) -40 mg per tablet Take 1 Tab by mouth every six (6) hours as needed for Pain for up to 30 doses. Max 6 /24 hours 30 Tab 0  
 gabapentin (NEURONTIN) 300 mg capsule Take 1 Cap by mouth nightly. 30 Cap 2 No current facility-administered medications on file prior to visit. Patient Active Problem List  
Diagnosis Code  Multiple sclerosis ([facility]) G35  
 Heavy menstrual period N92.0  Anemia D64.9  Obesity, Class III, BMI 40-49.9 (morbid obesity) ([facility]) E66.01  
 Mild intermittent asthma without complication U87.13  Vitamin D deficiency E55.9  Iron deficiency anemia due to chronic blood loss D50.0  Migraine without aura and without status migrainosus, not intractable G43.009  
 H/O tubal ligation Z98.51  
 Gastroesophageal reflux disease without esophagitis K21.9  Menorrhagia with regular cycle N92.0  BMI 45.0-49.9, adult ([facility]) Z68.42  
 S/P endometrial ablation Z98.890  
 Essential hypertension I10  
 Acute relapsing multiple sclerosis ([facility] Radha 37 Encounter for long-term (current) use of medications Z79.899 Visit Vitals /83 Pulse 73 Temp 100.3 °F (37.9 °C) (Oral) Resp 18 Ht 5' 6\" (1.676 m) Wt 303 lb 6.4 oz (137.6 kg) SpO2 93% BMI 48.97 kg/m² General appearance: alert, cooperative, no distress, appears stated age Neurologic: Alert and oriented X 3, normal strength and tone, symmetric. Normal without focal findings. Cranial nerves 2-12 intact. Normal coordination and gait. Mental status: Alert, oriented, thought content appropriate, affect: stable, mood-congruent. Head: Normocephalic, without obvious abnormality, atraumatic Eyes: conjunctivae/corneas clear. PERRL, EOM's intact.   
Ears: bilat TM normal 
 THroat: no swelling tonsils Neck: bilat cervical adenopathy, small, mobiles, nontender. Supple, symmetrical, trachea midline, no JVD Lungs: clear to auscultation bilaterally Heart: regular rate and rhythm, S1, S2 normal, no murmur, click, rub or gallop Abdomen: soft, non-tender. Extremities: extremities normal, atraumatic, no cyanosis or edema Onychomycosis bilat great toes Results for orders placed or performed in visit on 01/04/19 AMB POC RAPID INFLUENZA TEST Result Value Ref Range VALID INTERNAL CONTROL POC Yes QuickVue Influenza test Positive Negative Assessment/Plans: 
 
Diagnoses and all orders for this visit: 
 
1. Influenza A (H5N1) 
-     oseltamivir (TAMIFLU) 75 mg capsule; Take 1 Cap by mouth two (2) times a day for 5 days. 2. Acute URI 
-     AMB POC RAPID INFLUENZA TEST 3. Essential hypertension -     METABOLIC PANEL, COMPREHENSIVE 4. Iron deficiency anemia, unspecified iron deficiency anemia type 
-     CBC W/O DIFF 5. Encounter for long-term (current) use of medications -     METABOLIC PANEL, COMPREHENSIVE 
-     CBC W/O DIFF Discussed plans, risk/benefits of treatments/observations. Through the use of shared decision making, above plans were agreed upon. Medication compliance advised. Patient verbalized understanding. Follow-up Disposition: 
Return in about 4 months (around 5/4/2019) for annual exam, sooner if not better or labs abnormal. 
 
 
Kadie Encinas MD 
1/8/2019

## 2019-01-22 DIAGNOSIS — K21.9 GASTROESOPHAGEAL REFLUX DISEASE WITHOUT ESOPHAGITIS: ICD-10-CM

## 2019-01-22 DIAGNOSIS — E55.9 VITAMIN D DEFICIENCY: ICD-10-CM

## 2019-01-22 DIAGNOSIS — I10 ESSENTIAL HYPERTENSION: ICD-10-CM

## 2019-01-25 RX ORDER — HYDROCHLOROTHIAZIDE 25 MG/1
TABLET ORAL
Qty: 30 TAB | Refills: 0 | Status: SHIPPED | OUTPATIENT
Start: 2019-01-25 | End: 2019-03-04 | Stop reason: SDUPTHER

## 2019-01-25 RX ORDER — LOSARTAN POTASSIUM 50 MG/1
TABLET ORAL
Qty: 30 TAB | Refills: 0 | Status: SHIPPED | OUTPATIENT
Start: 2019-01-25 | End: 2019-03-04 | Stop reason: SDUPTHER

## 2019-01-25 RX ORDER — OMEPRAZOLE 40 MG/1
CAPSULE, DELAYED RELEASE ORAL
Qty: 30 CAP | Refills: 0 | Status: SHIPPED | OUTPATIENT
Start: 2019-01-25 | End: 2019-03-04 | Stop reason: SDUPTHER

## 2019-01-25 RX ORDER — ERGOCALCIFEROL 1.25 MG/1
CAPSULE ORAL
Qty: 4 CAP | Refills: 0 | Status: SHIPPED | OUTPATIENT
Start: 2019-01-25 | End: 2019-03-04 | Stop reason: SDUPTHER

## 2019-02-16 LAB
ALBUMIN SERPL-MCNC: 4.3 G/DL (ref 3.5–5.5)
ALBUMIN/GLOB SERPL: 1.5 {RATIO} (ref 1.2–2.2)
ALP SERPL-CCNC: 52 IU/L (ref 39–117)
ALT SERPL-CCNC: 25 IU/L (ref 0–32)
AST SERPL-CCNC: 19 IU/L (ref 0–40)
BILIRUB SERPL-MCNC: 0.2 MG/DL (ref 0–1.2)
BUN SERPL-MCNC: 11 MG/DL (ref 6–24)
BUN/CREAT SERPL: 13 (ref 9–23)
CALCIUM SERPL-MCNC: 9.9 MG/DL (ref 8.7–10.2)
CHLORIDE SERPL-SCNC: 98 MMOL/L (ref 96–106)
CO2 SERPL-SCNC: 26 MMOL/L (ref 20–29)
CREAT SERPL-MCNC: 0.88 MG/DL (ref 0.57–1)
ERYTHROCYTE [DISTWIDTH] IN BLOOD BY AUTOMATED COUNT: 15.4 % (ref 12.3–15.4)
GLOBULIN SER CALC-MCNC: 2.8 G/DL (ref 1.5–4.5)
GLUCOSE SERPL-MCNC: 80 MG/DL (ref 65–99)
HCT VFR BLD AUTO: 40.3 % (ref 34–46.6)
HGB BLD-MCNC: 13.3 G/DL (ref 11.1–15.9)
MCH RBC QN AUTO: 29.4 PG (ref 26.6–33)
MCHC RBC AUTO-ENTMCNC: 33 G/DL (ref 31.5–35.7)
MCV RBC AUTO: 89 FL (ref 79–97)
PLATELET # BLD AUTO: 430 X10E3/UL (ref 150–379)
POTASSIUM SERPL-SCNC: 4 MMOL/L (ref 3.5–5.2)
PROT SERPL-MCNC: 7.1 G/DL (ref 6–8.5)
RBC # BLD AUTO: 4.52 X10E6/UL (ref 3.77–5.28)
SODIUM SERPL-SCNC: 141 MMOL/L (ref 134–144)
WBC # BLD AUTO: 8.7 X10E3/UL (ref 3.4–10.8)

## 2019-03-04 DIAGNOSIS — E55.9 VITAMIN D DEFICIENCY: ICD-10-CM

## 2019-03-04 DIAGNOSIS — M48.061 FORAMINAL STENOSIS OF LUMBAR REGION: ICD-10-CM

## 2019-03-04 DIAGNOSIS — K21.9 GASTROESOPHAGEAL REFLUX DISEASE WITHOUT ESOPHAGITIS: ICD-10-CM

## 2019-03-04 DIAGNOSIS — I10 ESSENTIAL HYPERTENSION: ICD-10-CM

## 2019-03-04 NOTE — TELEPHONE ENCOUNTER
Last Visit: 1/4  Next Appt: 3/15  Previous Refill Encounter: 4/16-30+2    Requested Prescriptions     Pending Prescriptions Disp Refills    losartan (COZAAR) 50 mg tablet 30 Tab 0    omeprazole (PRILOSEC) 40 mg capsule 30 Cap 0    ergocalciferol (ERGOCALCIFEROL) 50,000 unit capsule 4 Cap 0    hydroCHLOROthiazide (HYDRODIURIL) 25 mg tablet 30 Tab 0

## 2019-03-04 NOTE — TELEPHONE ENCOUNTER
Last Visit: 1/4  Next Appt: 3/15  Previous Refill Encounter: 4/16-30+2    Requested Prescriptions     Pending Prescriptions Disp Refills    gabapentin (NEURONTIN) 300 mg capsule 90 Cap 1     Sig: Take 1 Cap by mouth nightly.     cetirizine (ZYRTEC) 10 mg tablet 90 Tab 1     Sig: TAKE 1 TABLET BY MOUTH EVERY DAY

## 2019-03-05 RX ORDER — ERGOCALCIFEROL 1.25 MG/1
50000 CAPSULE ORAL
Qty: 12 CAP | Refills: 1 | Status: SHIPPED | OUTPATIENT
Start: 2019-03-05 | End: 2019-08-30 | Stop reason: SDUPTHER

## 2019-03-05 RX ORDER — CETIRIZINE HCL 10 MG
TABLET ORAL
Qty: 90 TAB | Refills: 1 | Status: SHIPPED | OUTPATIENT
Start: 2019-03-05 | End: 2019-09-04 | Stop reason: SDUPTHER

## 2019-03-05 RX ORDER — OMEPRAZOLE 40 MG/1
CAPSULE, DELAYED RELEASE ORAL
Qty: 90 CAP | Refills: 1 | Status: SHIPPED | OUTPATIENT
Start: 2019-03-05 | End: 2019-09-04 | Stop reason: SDUPTHER

## 2019-03-05 RX ORDER — GABAPENTIN 300 MG/1
300 CAPSULE ORAL
Qty: 90 CAP | Refills: 1 | Status: SHIPPED | OUTPATIENT
Start: 2019-03-05 | End: 2019-11-03 | Stop reason: SDUPTHER

## 2019-03-05 RX ORDER — HYDROCHLOROTHIAZIDE 25 MG/1
TABLET ORAL
Qty: 90 TAB | Refills: 1 | Status: SHIPPED | OUTPATIENT
Start: 2019-03-05 | End: 2019-08-30 | Stop reason: SDUPTHER

## 2019-03-05 RX ORDER — LOSARTAN POTASSIUM 50 MG/1
TABLET ORAL
Qty: 90 TAB | Refills: 1 | Status: SHIPPED | OUTPATIENT
Start: 2019-03-05 | End: 2019-08-30 | Stop reason: SDUPTHER

## 2019-03-12 RX ORDER — ACETAMINOPHEN 325 MG/1
650 TABLET ORAL ONCE
Status: COMPLETED | OUTPATIENT
Start: 2019-03-15 | End: 2019-03-15

## 2019-03-12 RX ORDER — DIPHENHYDRAMINE HCL 25 MG
50 CAPSULE ORAL ONCE
Status: DISCONTINUED | OUTPATIENT
Start: 2019-03-15 | End: 2019-03-12

## 2019-03-12 RX ORDER — DIPHENHYDRAMINE HCL 25 MG
50 CAPSULE ORAL ONCE
Status: COMPLETED | OUTPATIENT
Start: 2019-03-15 | End: 2019-03-15

## 2019-03-12 RX ORDER — DIPHENHYDRAMINE HYDROCHLORIDE 50 MG/ML
50 INJECTION, SOLUTION INTRAMUSCULAR; INTRAVENOUS
Status: ACTIVE | OUTPATIENT
Start: 2019-03-15 | End: 2019-03-15

## 2019-03-12 RX ORDER — DIPHENHYDRAMINE HYDROCHLORIDE 50 MG/ML
50 INJECTION, SOLUTION INTRAMUSCULAR; INTRAVENOUS
Status: DISCONTINUED | OUTPATIENT
Start: 2019-03-15 | End: 2019-03-12

## 2019-03-12 RX ORDER — ACETAMINOPHEN 325 MG/1
650 TABLET ORAL
Status: DISCONTINUED | OUTPATIENT
Start: 2019-03-15 | End: 2019-03-12

## 2019-03-12 RX ORDER — ACETAMINOPHEN 325 MG/1
650 TABLET ORAL
Status: ACTIVE | OUTPATIENT
Start: 2019-03-15 | End: 2019-03-15

## 2019-03-12 RX ORDER — ACETAMINOPHEN 325 MG/1
650 TABLET ORAL ONCE
Status: DISCONTINUED | OUTPATIENT
Start: 2019-03-15 | End: 2019-03-12

## 2019-03-15 ENCOUNTER — HOSPITAL ENCOUNTER (OUTPATIENT)
Dept: INFUSION THERAPY | Age: 44
Discharge: HOME | End: 2019-03-15
Payer: COMMERCIAL

## 2019-03-15 VITALS
BODY MASS INDEX: 49.23 KG/M2 | TEMPERATURE: 98.1 F | RESPIRATION RATE: 18 BRPM | DIASTOLIC BLOOD PRESSURE: 71 MMHG | WEIGHT: 293 LBS | HEART RATE: 72 BPM | SYSTOLIC BLOOD PRESSURE: 105 MMHG

## 2019-03-15 PROCEDURE — 96375 TX/PRO/DX INJ NEW DRUG ADDON: CPT

## 2019-03-15 PROCEDURE — 96365 THER/PROPH/DIAG IV INF INIT: CPT

## 2019-03-15 PROCEDURE — 74011250636 HC RX REV CODE- 250/636

## 2019-03-15 PROCEDURE — 74011250637 HC RX REV CODE- 250/637

## 2019-03-15 RX ORDER — DIPHENHYDRAMINE HYDROCHLORIDE 50 MG/ML
INJECTION, SOLUTION INTRAMUSCULAR; INTRAVENOUS
Status: COMPLETED
Start: 2019-03-15 | End: 2019-03-15

## 2019-03-15 RX ORDER — SODIUM CHLORIDE 9 MG/ML
25 INJECTION, SOLUTION INTRAVENOUS CONTINUOUS
Status: DISPENSED | OUTPATIENT
Start: 2019-03-15 | End: 2019-03-16

## 2019-03-15 RX ORDER — SODIUM CHLORIDE 0.9 % (FLUSH) 0.9 %
10-40 SYRINGE (ML) INJECTION AS NEEDED
Status: ACTIVE | OUTPATIENT
Start: 2019-03-15 | End: 2019-03-16

## 2019-03-15 RX ADMIN — METHYLPREDNISOLONE SODIUM SUCCINATE 125 MG: 125 INJECTION, POWDER, FOR SOLUTION INTRAMUSCULAR; INTRAVENOUS at 09:35

## 2019-03-15 RX ADMIN — OCRELIZUMAB 300 MG: 300 INJECTION INTRAVENOUS at 10:30

## 2019-03-15 RX ADMIN — Medication 10 ML: at 09:31

## 2019-03-15 RX ADMIN — Medication 10 ML: at 13:25

## 2019-03-15 RX ADMIN — ACETAMINOPHEN 650 MG: 325 TABLET ORAL at 09:35

## 2019-03-15 RX ADMIN — DIPHENHYDRAMINE HYDROCHLORIDE 25 MG: 50 INJECTION, SOLUTION INTRAMUSCULAR; INTRAVENOUS at 13:20

## 2019-03-15 RX ADMIN — DIPHENHYDRAMINE HYDROCHLORIDE 25 MG: 50 INJECTION INTRAMUSCULAR; INTRAVENOUS at 13:20

## 2019-03-15 RX ADMIN — SODIUM CHLORIDE 25 ML/HR: 900 INJECTION, SOLUTION INTRAVENOUS at 09:31

## 2019-03-15 RX ADMIN — DIPHENHYDRAMINE HYDROCHLORIDE 50 MG: 25 CAPSULE ORAL at 09:35

## 2019-03-15 NOTE — PROGRESS NOTES
Outpatient Infusion Center Progress Note    1014  Pt admit to [facility] for week 0 Ocrevus ambulatory in stable condition. Assessment completed. Pt reports fatigue and tingling in arms/hands. 24 gauge PIV placed in right AC with positive blood return. [place] at Dr. Elan Florze office regarding Hep B results, results received via fax from MD office. Hep B negative on 1/30/19, scanned into [facility]. Medications:  NS  Tylenol PO  Benadryl PO  Solumedrol IV  Ocrevus IV titrated per orders    At completion of Ocrevus, pt reported itching of ears and throat. VSS and patient denied any difficulty swallowing or breathing. Pt given 25 mg Benadryl IV and pt monitored. Patient Vitals for the past 12 hrs:   Temp Pulse Resp BP   03/15/19 1520 98.1 °F (36.7 °C) 72 18 105/71   03/15/19 1420 98.9 °F (37.2 °C) 67 16 98/64   03/15/19 1315  70  113/77   03/15/19 1300 99 °F (37.2 °C) 71 18 120/76   03/15/19 1230 97.9 °F (36.6 °C) 75 16 110/72   03/15/19 1200 98.5 °F (36.9 °C) 66 18 108/74   03/15/19 1130 97.9 °F (36.6 °C) 61 16 109/71   03/15/19 1100 98.4 °F (36.9 °C) 65 16 121/75   03/15/19 0914 99 °F (37.2 °C) 67 18 120/84     Symptoms gradually resolved but patient remained for two hours post infusion due to drowsiness from benadryl. Pt denied any itching prior to discharge and stated she felt fine to drive home. Pt aware to call MD or report to ER if symptoms return. Pt sees her neurologist before her next appt and plans to discuss her symptoms with him at her appointment. 1530  PIV flushed and removed per [facility] policy. D/c home ambulatory in no distress. Pt aware of next appointment scheduled for 3/29/19 at 1300.

## 2019-03-26 RX ORDER — DIPHENHYDRAMINE HYDROCHLORIDE 50 MG/ML
50 INJECTION, SOLUTION INTRAMUSCULAR; INTRAVENOUS
Status: ACTIVE | OUTPATIENT
Start: 2019-03-29 | End: 2019-03-29

## 2019-03-26 RX ORDER — ACETAMINOPHEN 325 MG/1
650 TABLET ORAL ONCE
Status: COMPLETED | OUTPATIENT
Start: 2019-03-29 | End: 2019-03-29

## 2019-03-26 RX ORDER — DIPHENHYDRAMINE HCL 25 MG
50 CAPSULE ORAL ONCE
Status: COMPLETED | OUTPATIENT
Start: 2019-03-29 | End: 2019-03-29

## 2019-03-26 RX ORDER — ACETAMINOPHEN 325 MG/1
650 TABLET ORAL
Status: ACTIVE | OUTPATIENT
Start: 2019-03-29 | End: 2019-03-29

## 2019-03-29 ENCOUNTER — HOSPITAL ENCOUNTER (OUTPATIENT)
Dept: INFUSION THERAPY | Age: 44
Discharge: HOME | End: 2019-03-29
Payer: COMMERCIAL

## 2019-03-29 VITALS
RESPIRATION RATE: 18 BRPM | WEIGHT: 293 LBS | SYSTOLIC BLOOD PRESSURE: 130 MMHG | BODY MASS INDEX: 49.1 KG/M2 | OXYGEN SATURATION: 97 % | TEMPERATURE: 98.4 F | HEART RATE: 72 BPM | DIASTOLIC BLOOD PRESSURE: 79 MMHG

## 2019-03-29 PROCEDURE — 74011250636 HC RX REV CODE- 250/636: Performed by: PSYCHIATRY & NEUROLOGY

## 2019-03-29 PROCEDURE — 96375 TX/PRO/DX INJ NEW DRUG ADDON: CPT

## 2019-03-29 PROCEDURE — 96366 THER/PROPH/DIAG IV INF ADDON: CPT

## 2019-03-29 PROCEDURE — 96365 THER/PROPH/DIAG IV INF INIT: CPT

## 2019-03-29 PROCEDURE — 96360 HYDRATION IV INFUSION INIT: CPT

## 2019-03-29 PROCEDURE — 74011250637 HC RX REV CODE- 250/637: Performed by: PSYCHIATRY & NEUROLOGY

## 2019-03-29 RX ADMIN — ACETAMINOPHEN 650 MG: 325 TABLET ORAL at 09:53

## 2019-03-29 RX ADMIN — METHYLPREDNISOLONE SODIUM SUCCINATE 125 MG: 125 INJECTION, POWDER, FOR SOLUTION INTRAMUSCULAR; INTRAVENOUS at 09:53

## 2019-03-29 RX ADMIN — OCRELIZUMAB 300 MG: 300 INJECTION INTRAVENOUS at 10:46

## 2019-03-29 RX ADMIN — DIPHENHYDRAMINE HYDROCHLORIDE 50 MG: 25 CAPSULE ORAL at 09:52

## 2019-03-29 NOTE — PROGRESS NOTES
0940 Pt arrived at [facility] ambulatory and in no distress for Ocrevus week 2. Assessment completed, no new complaints voiced. IV established. Patient Vitals for the past 12 hrs:   Temp Pulse Resp BP SpO2   03/29/19 1356 98.4 °F (36.9 °C) 72 18 130/79 97 %   03/29/19 1315 98.6 °F (37 °C) 67 18 127/81    03/29/19 1245 98.8 °F (37.1 °C) 70 16 119/79    03/29/19 1215 99 °F (37.2 °C) 62 18 123/80    03/29/19 1145 98.5 °F (36.9 °C) 65 18 125/81    03/29/19 1115 98.5 °F (36.9 °C) (!) 58 18 122/79    03/29/19 0941 99.2 °F (37.3 °C) 61 18 (!) 140/94        Medications received:  Tylenol  Solumedrol  Benadryl  Ocrevus    1355 Tolerated treatment well, no adverse reaction noted. IV d/c'd. D/Cd from [facility] ambulatory and in no distress accompanied by self. Next appt 9/16 @ 0900.

## 2019-06-12 ENCOUNTER — HOSPITAL ENCOUNTER (OUTPATIENT)
Dept: MAMMOGRAPHY | Age: 44
Discharge: HOME | End: 2019-06-12
Attending: FAMILY MEDICINE
Payer: COMMERCIAL

## 2019-06-12 DIAGNOSIS — Z12.31 VISIT FOR SCREENING MAMMOGRAM: ICD-10-CM

## 2019-06-12 PROCEDURE — 77067 SCR MAMMO BI INCL CAD: CPT

## 2019-08-16 ENCOUNTER — TELEPHONE (OUTPATIENT)
Dept: FAMILY MEDICINE CLINIC | Age: 44
End: 2019-08-16

## 2019-08-16 NOTE — TELEPHONE ENCOUNTER
Her BP was 160/120 at back MD. They were going to injected her back but didn't d/t BP. She has been checking since then and it's been 130/60-70. She is keeping record. She wants to know if should increase losartan to 2 tabs daily? She has appt for 8-30-19 & will be out of town next week.

## 2019-08-16 NOTE — TELEPHONE ENCOUNTER
PT wanted to no if she can take Losartan 50 mg can she increase her meds to 2 pills      Best contact number to reach her is [phone]

## 2019-08-16 NOTE — TELEPHONE ENCOUNTER
MD Rema Guardado, YOLETTE   Caller: Unspecified (Today, 12:43 PM)             Sure she can. But she's not been seen for 7 months.  Considered noncompliant to f/u   She needs to be seen     thx     Hugo Carlos MD   8/16/2019      Patient notified

## 2019-08-30 ENCOUNTER — OFFICE VISIT (OUTPATIENT)
Dept: FAMILY MEDICINE CLINIC | Age: 44
End: 2019-08-30

## 2019-08-30 VITALS
BODY MASS INDEX: 47.09 KG/M2 | HEIGHT: 66 IN | RESPIRATION RATE: 16 BRPM | DIASTOLIC BLOOD PRESSURE: 93 MMHG | SYSTOLIC BLOOD PRESSURE: 138 MMHG | WEIGHT: 293 LBS | TEMPERATURE: 98.2 F | OXYGEN SATURATION: 98 % | HEART RATE: 69 BPM

## 2019-08-30 DIAGNOSIS — E55.9 VITAMIN D DEFICIENCY: ICD-10-CM

## 2019-08-30 DIAGNOSIS — M54.41 CHRONIC BILATERAL LOW BACK PAIN WITH BILATERAL SCIATICA: ICD-10-CM

## 2019-08-30 DIAGNOSIS — I10 ESSENTIAL HYPERTENSION: ICD-10-CM

## 2019-08-30 DIAGNOSIS — G89.4 CHRONIC PAIN DISORDER: ICD-10-CM

## 2019-08-30 DIAGNOSIS — F41.8 DEPRESSION WITH ANXIETY: Primary | ICD-10-CM

## 2019-08-30 DIAGNOSIS — Z79.899 ENCOUNTER FOR LONG-TERM (CURRENT) USE OF MEDICATIONS: ICD-10-CM

## 2019-08-30 DIAGNOSIS — M54.42 CHRONIC BILATERAL LOW BACK PAIN WITH BILATERAL SCIATICA: ICD-10-CM

## 2019-08-30 DIAGNOSIS — G89.29 CHRONIC BILATERAL LOW BACK PAIN WITH BILATERAL SCIATICA: ICD-10-CM

## 2019-08-30 RX ORDER — LOSARTAN POTASSIUM 100 MG/1
TABLET ORAL
Qty: 90 TAB | Refills: 1 | Status: SHIPPED | OUTPATIENT
Start: 2019-08-30 | End: 2019-11-11 | Stop reason: SDUPTHER

## 2019-08-30 RX ORDER — ERGOCALCIFEROL 1.25 MG/1
50000 CAPSULE ORAL
Qty: 12 CAP | Refills: 1 | Status: SHIPPED | OUTPATIENT
Start: 2019-08-30 | End: 2020-02-05 | Stop reason: SDUPTHER

## 2019-08-30 RX ORDER — DIAZEPAM 5 MG/1
TABLET ORAL
COMMUNITY
Start: 2019-08-27 | End: 2019-11-13 | Stop reason: ALTCHOICE

## 2019-08-30 RX ORDER — DULOXETIN HYDROCHLORIDE 30 MG/1
30 CAPSULE, DELAYED RELEASE ORAL DAILY
Qty: 30 CAP | Refills: 1 | Status: SHIPPED | OUTPATIENT
Start: 2019-08-30 | End: 2019-10-28 | Stop reason: SDUPTHER

## 2019-08-30 RX ORDER — HYDROCHLOROTHIAZIDE 25 MG/1
TABLET ORAL
Qty: 90 TAB | Refills: 1 | Status: SHIPPED | OUTPATIENT
Start: 2019-08-30 | End: 2020-02-05 | Stop reason: SDUPTHER

## 2019-08-30 NOTE — PROGRESS NOTES
Chief Complaint   Patient presents with    Complete Physical       1. Have you been to the ER, urgent care clinic since your last visit? Hospitalized since your last visit? no    2. Have you seen or consulted any other health care providers outside of the [address] [place] [address] since your last visit? Include any pap smears or colon screening.  yes

## 2019-08-30 NOTE — PROGRESS NOTES
Glory Bach is a 38 y.o. female    Last saw her 8 months ago. has a past medical history of BMI 45.0-49.9, adult ([facility] (2/14/2018), Essential hypertension (4/17/2018), Gastroesophageal reflux disease without esophagitis (3/16/2017), H/O tubal ligation (3/16/2017), Iron deficiency anemia due to chronic blood loss (3/16/2017), Menorrhagia with regular cycle (4/3/2017), Migraine without aura and without status migrainosus, not intractable (3/16/2017), Mild intermittent asthma without complication (2/50/9843), MS (multiple sclerosis) ([facility] (10/08), Obesity, Class III, BMI 40-49.9 (morbid obesity) [facility] (12/3/2015), S/P endometrial ablation (4/16/2018), and Vitamin D deficiency (3/16/2017). depression and anxiety  Her health, chronic pain, weight gain, her mom in the nursing home. Sleep too much, chronic fatigue, cries often. Denies tam, visual, auditory or belief hallucination  Cousin is bipolar, mother had depression. This started about 6 months ago. Denies SI or HI. Neuro have tried to put her on prozac but it just made her blank out. Given her chronic pain and back pain, we'll start cymbalta    HTN: BP Not at goal today. HCTZ 25mg and Losartan 50mg. We'll up losartan to 100mg. Chronic low back pain seeing pain specialist, had steroid injection. Neuro at [facility]  MS managed    Gabapentin done there. Says gabapentin not working. Onychomycosis bilat great toe nails. Reviewed: active problem list, medication list, allergies, notes from last encounter    A comprehensive review of systems was negative except for that written in the HPI. Allergies   Allergen Reactions    Tysabri [Natalizumab] Anaphylaxis     Current Outpatient Medications on File Prior to Visit   Medication Sig Dispense Refill    ocrelizumab (OCREVUS) 30 mg/mL soln injection by IntraVENous route.       omeprazole (PRILOSEC) 40 mg capsule TAKE 1 CAPSULE BY MOUTH DAILY 90 Cap 1    gabapentin (NEURONTIN) 300 mg capsule Take 1 Cap by mouth nightly. 90 Cap 1    cetirizine (ZYRTEC) 10 mg tablet TAKE 1 TABLET BY MOUTH EVERY DAY 90 Tab 1    albuterol (PROVENTIL HFA) 90 mcg/actuation inhaler Take 2 Puffs by inhalation every four (4) hours as needed for Wheezing (and cough). 1 Inhaler 2    fluticasone (FLONASE) 50 mcg/actuation nasal spray 2 Sprays by Both Nostrils route daily. 1 Bottle 2    diazePAM (VALIUM) 5 mg tablet       butalbital-acetaminophen-caffeine (FIORICET, ESGIC) -40 mg per tablet Take 1 Tab by mouth every six (6) hours as needed for Pain for up to 30 doses. Max 6 /24 hours 30 Tab 0    Nebulizer Accessories kit Per patient's needs. 1 Kit 0    albuterol (PROVENTIL VENTOLIN) 2.5 mg /3 mL (0.083 %) nebulizer solution 3 mL by Nebulization route every four (4) hours as needed for Wheezing. 200 Each 5    fluticasone-salmeterol (ADVAIR DISKUS) 250-50 mcg/dose diskus inhaler Take 1 Puff by inhalation every twelve (12) hours. 1 Each 2     No current facility-administered medications on file prior to visit.       Patient Active Problem List   Diagnosis Code    Multiple sclerosis ([facility] G35    Heavy menstrual period N92.0    Anemia D64.9    Obesity, Class III, BMI 40-49.9 (morbid obesity) ([facility]) E66.01    Mild intermittent asthma without complication Z98.93    Vitamin D deficiency E55.9    Iron deficiency anemia due to chronic blood loss D50.0    Migraine without aura and without status migrainosus, not intractable G43.009    H/O tubal ligation Z98.51    Gastroesophageal reflux disease without esophagitis K21.9    Menorrhagia with regular cycle N92.0    BMI 45.0-49.9, adult ([facility]) Z68.42    S/P endometrial ablation Z98.890    Essential hypertension I10    [facility] relapsing [facility] [address]    Encounter for long-term (current) use of medications Z79.899       Visit Vitals  BP (!) 138/93   Pulse 69   Temp 98.2 °F (36.8 °C) (Oral)   Resp 16   Ht 5' 6\" (1.676 m)   Wt 314 lb 6.4 oz (142.6 kg)   SpO2 98%   BMI 50.75 kg/m²     General appearance: alert, cooperative, no distress, appears stated age  Neurologic: Alert and oriented X 3, normal strength and tone, symmetric. Normal without focal findings. Cranial nerves 2-12 intact. Normal coordination and gait. Mental status: Alert, oriented, thought content appropriate, affect: stable, mood-congruent. Head: Normocephalic, without obvious abnormality, atraumatic  Eyes: conjunctivae/corneas clear. PERRL, EOM's intact. Ears: bilat TM normal  THroat: no swelling tonsils  Neck: bilat cervical adenopathy, small, mobiles, nontender. Supple, symmetrical, trachea midline, no JVD  Lungs: clear to auscultation bilaterally  Heart: regular rate and rhythm, S1, S2 normal, no murmur, click, rub or gallop  Abdomen: soft, non-tender. Extremities: extremities normal, atraumatic, no cyanosis or edema   Onychomycosis bilat great toes       Assessment/Plans:    Diagnoses and all orders for this visit:    1. Depression with anxiety  -     DULoxetine (CYMBALTA) 30 mg capsule; Take 1 Cap by mouth daily. 2. Chronic pain disorder  -     DULoxetine (CYMBALTA) 30 mg capsule; Take 1 Cap by mouth daily. 3. Essential hypertension  -     losartan (COZAAR) 100 mg tablet; TAKE 1 TABLET BY MOUTH DAILY  -     hydroCHLOROthiazide (HYDRODIURIL) 25 mg tablet; TAKE 1 TABLET BY MOUTH DAILY    4. Vitamin D deficiency  -     ergocalciferol (ERGOCALCIFEROL) 50,000 unit capsule; Take 1 Cap by mouth every seven (7) days. TAKE 1 CAPSULE BY MOUTH EVERY 7 DAYS    5. Chronic bilateral low back pain with bilateral sciatica    6. Encounter for long-term (current) use of medications  -     METABOLIC PANEL, COMPREHENSIVE  -     TSH 3RD GENERATION      Discussed plans, risk/benefits of treatments/observations. Through the use of shared decision making, above plans were agreed upon. Medication compliance advised. Patient verbalized understanding.      Follow-up and Dispositions    · Return in about 3 weeks (around 9/20/2019) for anxiety, depression.          Beth Sosa MD  8/30/2019

## 2019-08-31 LAB
ALBUMIN SERPL-MCNC: 4.4 G/DL (ref 3.5–5.5)
ALBUMIN/GLOB SERPL: 1.5 {RATIO} (ref 1.2–2.2)
ALP SERPL-CCNC: 49 IU/L (ref 39–117)
ALT SERPL-CCNC: 28 IU/L (ref 0–32)
AST SERPL-CCNC: 24 IU/L (ref 0–40)
BILIRUB SERPL-MCNC: 0.3 MG/DL (ref 0–1.2)
BUN SERPL-MCNC: 10 MG/DL (ref 6–24)
BUN/CREAT SERPL: 11 (ref 9–23)
CALCIUM SERPL-MCNC: 9.9 MG/DL (ref 8.7–10.2)
CHLORIDE SERPL-SCNC: 101 MMOL/L (ref 96–106)
CO2 SERPL-SCNC: 27 MMOL/L (ref 20–29)
CREAT SERPL-MCNC: 0.89 MG/DL (ref 0.57–1)
GLOBULIN SER CALC-MCNC: 2.9 G/DL (ref 1.5–4.5)
GLUCOSE SERPL-MCNC: 75 MG/DL (ref 65–99)
POTASSIUM SERPL-SCNC: 4.4 MMOL/L (ref 3.5–5.2)
PROT SERPL-MCNC: 7.3 G/DL (ref 6–8.5)
SODIUM SERPL-SCNC: 141 MMOL/L (ref 134–144)
TSH SERPL DL<=0.005 MIU/L-ACNC: 1.67 UIU/ML (ref 0.45–4.5)

## 2019-09-04 DIAGNOSIS — K21.9 GASTROESOPHAGEAL REFLUX DISEASE WITHOUT ESOPHAGITIS: ICD-10-CM

## 2019-09-04 DIAGNOSIS — E55.9 VITAMIN D DEFICIENCY: ICD-10-CM

## 2019-09-04 DIAGNOSIS — I10 ESSENTIAL HYPERTENSION: ICD-10-CM

## 2019-09-04 RX ORDER — OMEPRAZOLE 40 MG/1
CAPSULE, DELAYED RELEASE ORAL
Qty: 90 CAP | Refills: 0 | Status: SHIPPED | OUTPATIENT
Start: 2019-09-04 | End: 2019-11-11 | Stop reason: SDUPTHER

## 2019-09-04 RX ORDER — HYDROCHLOROTHIAZIDE 25 MG/1
TABLET ORAL
Qty: 90 TAB | Refills: 0 | Status: SHIPPED | OUTPATIENT
Start: 2019-09-04 | End: 2019-11-13 | Stop reason: SDUPTHER

## 2019-09-04 RX ORDER — CETIRIZINE HCL 10 MG
TABLET ORAL
Qty: 90 TAB | Refills: 0 | Status: SHIPPED | OUTPATIENT
Start: 2019-09-04 | End: 2019-11-11 | Stop reason: SDUPTHER

## 2019-09-04 RX ORDER — ERGOCALCIFEROL 1.25 MG/1
CAPSULE ORAL
Qty: 12 CAP | Refills: 0 | Status: SHIPPED | OUTPATIENT
Start: 2019-09-04 | End: 2019-11-13 | Stop reason: SDUPTHER

## 2019-09-09 RX ORDER — DIPHENHYDRAMINE HCL 25 MG
50 CAPSULE ORAL ONCE
Status: COMPLETED | OUTPATIENT
Start: 2019-09-16 | End: 2019-09-16

## 2019-09-09 RX ORDER — ACETAMINOPHEN 325 MG/1
650 TABLET ORAL ONCE
Status: COMPLETED | OUTPATIENT
Start: 2019-09-16 | End: 2019-09-16

## 2019-09-09 RX ORDER — DIPHENHYDRAMINE HYDROCHLORIDE 50 MG/ML
50 INJECTION, SOLUTION INTRAMUSCULAR; INTRAVENOUS
Status: DISCONTINUED | OUTPATIENT
Start: 2019-09-16 | End: 2019-09-17 | Stop reason: HOSPADM

## 2019-09-09 RX ORDER — ACETAMINOPHEN 325 MG/1
650 TABLET ORAL
Status: DISCONTINUED | OUTPATIENT
Start: 2019-09-16 | End: 2019-09-17 | Stop reason: HOSPADM

## 2019-09-16 ENCOUNTER — HOSPITAL ENCOUNTER (OUTPATIENT)
Dept: INFUSION THERAPY | Age: 44
Discharge: HOME | End: 2019-09-16
Payer: COMMERCIAL

## 2019-09-16 VITALS
TEMPERATURE: 98.2 F | HEART RATE: 65 BPM | WEIGHT: 293 LBS | BODY MASS INDEX: 47.09 KG/M2 | HEIGHT: 66 IN | OXYGEN SATURATION: 96 % | DIASTOLIC BLOOD PRESSURE: 80 MMHG | SYSTOLIC BLOOD PRESSURE: 118 MMHG | RESPIRATION RATE: 16 BRPM

## 2019-09-16 PROCEDURE — 74011250636 HC RX REV CODE- 250/636: Performed by: PSYCHIATRY & NEUROLOGY

## 2019-09-16 PROCEDURE — 96413 CHEMO IV INFUSION 1 HR: CPT

## 2019-09-16 PROCEDURE — 96375 TX/PRO/DX INJ NEW DRUG ADDON: CPT

## 2019-09-16 PROCEDURE — 74011250637 HC RX REV CODE- 250/637: Performed by: PSYCHIATRY & NEUROLOGY

## 2019-09-16 PROCEDURE — 96415 CHEMO IV INFUSION ADDL HR: CPT

## 2019-09-16 RX ORDER — SODIUM CHLORIDE 9 MG/ML
25 INJECTION, SOLUTION INTRAVENOUS AS NEEDED
Status: DISCONTINUED | OUTPATIENT
Start: 2019-09-16 | End: 2019-09-17 | Stop reason: HOSPADM

## 2019-09-16 RX ORDER — SODIUM CHLORIDE 0.9 % (FLUSH) 0.9 %
10-40 SYRINGE (ML) INJECTION AS NEEDED
Status: DISCONTINUED | OUTPATIENT
Start: 2019-09-16 | End: 2019-09-17 | Stop reason: HOSPADM

## 2019-09-16 RX ADMIN — Medication 10 ML: at 09:30

## 2019-09-16 RX ADMIN — Medication 10 ML: at 12:30

## 2019-09-16 RX ADMIN — OCRELIZUMAB 600 MG: 300 INJECTION INTRAVENOUS at 10:00

## 2019-09-16 RX ADMIN — DIPHENHYDRAMINE HYDROCHLORIDE 50 MG: 25 CAPSULE ORAL at 09:31

## 2019-09-16 RX ADMIN — SODIUM CHLORIDE 25 ML/HR: 900 INJECTION, SOLUTION INTRAVENOUS at 09:37

## 2019-09-16 RX ADMIN — METHYLPREDNISOLONE SODIUM SUCCINATE 125 MG: 125 INJECTION, POWDER, FOR SOLUTION INTRAMUSCULAR; INTRAVENOUS at 09:37

## 2019-09-16 RX ADMIN — ACETAMINOPHEN 650 MG: 325 TABLET ORAL at 09:31

## 2019-09-16 NOTE — PROGRESS NOTES
[facility] Progress Note    0915  Pt arrived ambulatory and in no distress for Ocrevus    Assessment unremarkable, no new concerns voiced. 24G PIV established in left A/C with positive blood return noted. Patient Vitals for the past 12 hrs:   Temp Pulse Resp BP SpO2   09/16/19 1223  65  118/80    09/16/19 1200 98.2 °F (36.8 °C) 67 16 121/81    09/16/19 1130 98.3 °F (36.8 °C) 71 16 117/79    09/16/19 1100 97.9 °F (36.6 °C) 71 16 123/86    09/16/19 1030 97.7 °F (36.5 °C) 68 16 123/73    09/16/19 0918 98.2 °F (36.8 °C) 81 16 126/88 96 %      The following medications administered:  Medications Administered     0.9% sodium chloride infusion     Admin Date  09/16/2019 Action  New Bag Dose  25 mL/hr Rate  25 mL/hr Route  IntraVENous Administered By  Loreto Rosado, RN          acetaminophen (TYLENOL) tablet 650 mg     Admin Date  09/16/2019 Action  Given Dose  650 mg Route  Oral Administered By  Loreto Pump, RN          diphenhydrAMINE (BENADRYL) capsule 50 mg     Admin Date  09/16/2019 Action  Given Dose  50 mg Route  Oral Administered By  Loreto Pump, RN          methylPREDNISolone (PF) (Solu-MEDROL) injection 125 mg     Admin Date  09/16/2019 Action  Given Dose  125 mg Route  IntraVENous Administered By  Loreto Rosado, RN          ocrelizumab (OCREVUS) 600 mg in 0.9% sodium chloride 250 mL infusion     Admin Date  09/16/2019 Action  New Bag Dose  600 mg Route  IntraVENous Administered By  Loreto Rosado, RN          sodium chloride (NS) flush 10-40 mL     Admin Date  09/16/2019 Action  Given Dose  10 mL Route  IntraVENous Administered By  Loreto Pump, RN           Admin Date  09/16/2019 Action  Given Dose  10 mL Route  IntraVENous Administered By  Loreto Pump, RN              Pt tolerated treatment well. IV flushed per policy and removed, 2x2 and coban placed. 1230  Pt discharged ambulatory in no acute distress.  Next appointment:    Future Appointments   Date Time Provider Giovanna Cheryl   9/20/2019  2:00 PM Harsha Lema MD [id number]   3/16/2020  9:00 AM CHAIR [address] [id number]

## 2019-11-03 DIAGNOSIS — J01.00 ACUTE NON-RECURRENT MAXILLARY SINUSITIS: ICD-10-CM

## 2019-11-03 DIAGNOSIS — G89.4 CHRONIC PAIN DISORDER: ICD-10-CM

## 2019-11-03 DIAGNOSIS — F41.8 DEPRESSION WITH ANXIETY: ICD-10-CM

## 2019-11-03 DIAGNOSIS — M48.061 FORAMINAL STENOSIS OF LUMBAR REGION: ICD-10-CM

## 2019-11-06 RX ORDER — GABAPENTIN 300 MG/1
300 CAPSULE ORAL
Qty: 30 CAP | Refills: 1 | Status: SHIPPED | OUTPATIENT
Start: 2019-11-06 | End: 2020-09-17

## 2019-11-06 RX ORDER — FLUTICASONE PROPIONATE 50 MCG
2 SPRAY, SUSPENSION (ML) NASAL DAILY
Qty: 1 BOTTLE | Refills: 2 | Status: SHIPPED | OUTPATIENT
Start: 2019-11-06 | End: 2019-11-12 | Stop reason: SDUPTHER

## 2019-11-06 RX ORDER — DULOXETIN HYDROCHLORIDE 30 MG/1
30 CAPSULE, DELAYED RELEASE ORAL DAILY
Qty: 30 CAP | Refills: 2 | Status: SHIPPED | OUTPATIENT
Start: 2019-11-06 | End: 2019-11-13

## 2019-11-06 RX ORDER — FLUTICASONE PROPIONATE AND SALMETEROL 250; 50 UG/1; UG/1
1 POWDER RESPIRATORY (INHALATION) EVERY 12 HOURS
Qty: 1 EACH | Refills: 2 | Status: SHIPPED | OUTPATIENT
Start: 2019-11-06 | End: 2019-11-12 | Stop reason: SDUPTHER

## 2019-11-11 ENCOUNTER — PATIENT MESSAGE (OUTPATIENT)
Dept: FAMILY MEDICINE CLINIC | Age: 44
End: 2019-11-11

## 2019-11-11 DIAGNOSIS — J01.00 ACUTE NON-RECURRENT MAXILLARY SINUSITIS: ICD-10-CM

## 2019-11-11 DIAGNOSIS — G89.4 CHRONIC PAIN DISORDER: ICD-10-CM

## 2019-11-11 DIAGNOSIS — K21.9 GASTROESOPHAGEAL REFLUX DISEASE WITHOUT ESOPHAGITIS: ICD-10-CM

## 2019-11-11 DIAGNOSIS — I10 ESSENTIAL HYPERTENSION: ICD-10-CM

## 2019-11-11 DIAGNOSIS — F41.8 DEPRESSION WITH ANXIETY: ICD-10-CM

## 2019-11-12 NOTE — TELEPHONE ENCOUNTER
----- Message from Sidonie Gilford sent at 11/11/2019  7:48 PM EST -----  Regarding: Prescription Question  Contact: [phone]  Hello,    I have submitted 4 refills for my regular taken meds. Please ensure they are refilled for 90 day supplies (instead of 30) due to my insurance requires this for meds taken every day. Let me know if you have any questions.      Kind regards,   Marlyn Macias   [phone]

## 2019-11-13 ENCOUNTER — OFFICE VISIT (OUTPATIENT)
Dept: FAMILY MEDICINE CLINIC | Age: 44
End: 2019-11-13

## 2019-11-13 VITALS
HEIGHT: 66 IN | WEIGHT: 293 LBS | OXYGEN SATURATION: 97 % | SYSTOLIC BLOOD PRESSURE: 127 MMHG | DIASTOLIC BLOOD PRESSURE: 84 MMHG | RESPIRATION RATE: 16 BRPM | BODY MASS INDEX: 47.09 KG/M2 | HEART RATE: 71 BPM | TEMPERATURE: 97.4 F

## 2019-11-13 DIAGNOSIS — I10 ESSENTIAL HYPERTENSION: ICD-10-CM

## 2019-11-13 DIAGNOSIS — Z91.199 NONCOMPLIANCE: ICD-10-CM

## 2019-11-13 DIAGNOSIS — F41.8 DEPRESSION WITH ANXIETY: Primary | ICD-10-CM

## 2019-11-13 DIAGNOSIS — G47.09 OTHER INSOMNIA: ICD-10-CM

## 2019-11-13 DIAGNOSIS — Z23 ENCOUNTER FOR IMMUNIZATION: ICD-10-CM

## 2019-11-13 PROBLEM — F33.0 DEPRESSION, MAJOR, RECURRENT, MILD (HCC): Status: ACTIVE | Noted: 2019-11-13

## 2019-11-13 RX ORDER — BUPROPION HYDROCHLORIDE 150 MG/1
150 TABLET ORAL
Qty: 30 TAB | Refills: 0 | Status: SHIPPED | OUTPATIENT
Start: 2019-11-13 | End: 2020-03-04 | Stop reason: SDDI

## 2019-11-13 RX ORDER — TRAZODONE HYDROCHLORIDE 50 MG/1
50 TABLET ORAL
Qty: 30 TAB | Refills: 1 | Status: SHIPPED | OUTPATIENT
Start: 2019-11-13 | End: 2020-03-04 | Stop reason: ALTCHOICE

## 2019-11-13 NOTE — PROGRESS NOTES
Manuela Young is a 38 y.o. female    Pt noncompliant to f/u   I last started her on 2 new medications. She was to f/u in 3 weeks. Now it's 3 months later. has a past medical history of BMI 45.0-49.9, adult ([facility] (2/14/2018), Essential hypertension (4/17/2018), Gastroesophageal reflux disease without esophagitis (3/16/2017), H/O tubal ligation (3/16/2017), Iron deficiency anemia due to chronic blood loss (3/16/2017), Menorrhagia with regular cycle (4/3/2017), Migraine without aura and without status migrainosus, not intractable (3/16/2017), Mild intermittent asthma without complication (2/32/7226), MS (multiple sclerosis) ([facility] (10/08), Obesity, Class III, BMI 40-49.9 (morbid obesity) ([facility] (12/3/2015), S/P endometrial ablation (4/16/2018), and Vitamin D deficiency (3/16/2017). depression and anxiety  Contributing cause: Her health, chronic pain, weight gain, her mom in the nursing home. Sleep too much, chronic fatigue, cries often. Denies tam, visual, auditory or belief hallucination  Cousin is bipolar, mother had depression. This started about 6 months ago. Denies SI or HI. Neuro have tried to put her on prozac but it just made her \"blank out\". Last visit; Given her chronic pain and back pain, we started cymbalta; she report feels no difference. All above symptoms still there. Doesn't help with pain. We'll d/c cymbalta. Start wellbutrin    Insomnia; start trazodone prn qhs    HTN: BP at goal today. Last visit we up losartan dose   Continue course, HCTZ 25mg and Losartan 100mg. Chronic low back pain seeing pain specialist, had steroid injection. Neuro at [facility] [place]  Migraine HA  MS managed    Gabapentin filled by her Neurologist      Reviewed: active problem list, medication list, allergies, notes from last encounter    A comprehensive review of systems was negative except for that written in the HPI.       Allergies   Allergen Reactions    Tysabri [Natalizumab] Anaphylaxis Current Outpatient Medications on File Prior to Visit   Medication Sig Dispense Refill    fluticasone propionate (FLONASE) 50 mcg/actuation nasal spray 2 Sprays by Both Nostrils route daily. 1 Bottle 2    gabapentin (NEURONTIN) 300 mg capsule Take 1 Cap by mouth nightly. 30 Cap 1    cetirizine (ZYRTEC) 10 mg tablet TAKE 1 TABLET BY MOUTH EVERY DAY 90 Tab 0    omeprazole (PRILOSEC) 40 mg capsule TAKE 1 CAPSULE BY MOUTH DAILY 90 Cap 0    ocrelizumab (OCREVUS) 30 mg/mL soln injection by IntraVENous route.  losartan (COZAAR) 100 mg tablet TAKE 1 TABLET BY MOUTH DAILY 90 Tab 1    hydroCHLOROthiazide (HYDRODIURIL) 25 mg tablet TAKE 1 TABLET BY MOUTH DAILY 90 Tab 1    ergocalciferol (ERGOCALCIFEROL) 50,000 unit capsule Take 1 Cap by mouth every seven (7) days. TAKE 1 CAPSULE BY MOUTH EVERY 7 DAYS 12 Cap 1    Nebulizer Accessories kit Per patient's needs. 1 Kit 0    albuterol (PROVENTIL HFA) 90 mcg/actuation inhaler Take 2 Puffs by inhalation every four (4) hours as needed for Wheezing (and cough). 1 Inhaler 2    albuterol (PROVENTIL VENTOLIN) 2.5 mg /3 mL (0.083 %) nebulizer solution 3 mL by Nebulization route every four (4) hours as needed for Wheezing. 200 Each 5    fluticasone propion-salmeterol (ADVAIR DISKUS) 250-50 mcg/dose diskus inhaler Take 1 Puff by inhalation every twelve (12) hours. 1 Each 2    butalbital-acetaminophen-caffeine (FIORICET, ESGIC) -40 mg per tablet Take 1 Tab by mouth every six (6) hours as needed for Pain for up to 30 doses. Max 6 /24 hours 30 Tab 0     No current facility-administered medications on file prior to visit.       Patient Active Problem List   Diagnosis Code    Multiple sclerosis ([facility] G35    Heavy menstrual period N92.0    Anemia D64.9    Obesity, Class III, BMI 40-49.9 (morbid obesity) (HCC) E66.01    Mild intermittent asthma without complication F57.50    Vitamin D deficiency E55.9    Iron deficiency anemia due to chronic blood loss D50.0    Migraine without aura and without status migrainosus, not intractable G43.009    H/O tubal ligation Z98.51    Gastroesophageal reflux disease without esophagitis K21.9    Menorrhagia with regular cycle N92.0    BMI 45.0-49.9, adult (HCC) Z68.42    S/P endometrial ablation Z98.890    Essential hypertension I10    Acute relapsing multiple sclerosis ([facility] G35    Encounter for long-term (current) use of medications Z79.899    Depression, major, recurrent, mild (HCC) F33.0       Visit Vitals  /84   Pulse 71   Temp 97.4 °F (36.3 °C) (Oral)   Resp 16   Ht 5' 6\" (1.676 m)   Wt 316 lb 6.4 oz (143.5 kg)   SpO2 97%   BMI 51.07 kg/m²     General appearance: alert, cooperative, no distress, appears stated age  Neurologic: Alert and oriented X 3, normal strength and tone, symmetric. Normal without focal findings. Cranial nerves 2-12 intact. Normal coordination and gait. Mental status: Alert, oriented, thought content appropriate, affect: stable, mood-congruent. Head: Normocephalic, without obvious abnormality, atraumatic  Eyes: conjunctivae/corneas clear. PERRL, EOM's intact. Ears: bilat TM normal  THroat: no swelling tonsils  Neck: bilat cervical adenopathy, small, mobiles, nontender. Supple, symmetrical, trachea midline, no JVD  Lungs: clear to auscultation bilaterally  Heart: regular rate and rhythm, S1, S2 normal, no murmur, click, rub or gallop  Abdomen: soft, non-tender. Extremities: extremities normal, atraumatic, no cyanosis or edema      Assessment/Plans:    Diagnoses and all orders for this visit:    1. Depression with anxiety  -     buPROPion XL (WELLBUTRIN XL) 150 mg tablet; Take 1 Tab by mouth every morning.  -     traZODone (DESYREL) 50 mg tablet; Take 1 Tab by mouth nightly. 2. Other insomnia  -     traZODone (DESYREL) 50 mg tablet; Take 1 Tab by mouth nightly. 3. Essential hypertension    4. Noncompliance    5.  Encounter for immunization  -     INFLUENZA VIRUS VAC QUAD,SPLIT,PRESV FREE SYRINGE IM  -     AR IMMUNIZ ADMIN,1 SINGLE/COMB VAC/TOXOID      Discussed plans, risk/benefits of treatments/observations. Through the use of shared decision making, above plans were agreed upon. Medication compliance advised. Patient verbalized understanding. Follow-up and Dispositions    · Return in about 3 weeks (around 12/4/2019) for anxiety depression.          Luly Lerma MD  11/14/2019

## 2019-11-13 NOTE — PATIENT INSTRUCTIONS
Vaccine Information Statement Influenza (Flu) Vaccine (Inactivated or Recombinant): What You Need to Know Many Vaccine Information Statements are available in [place] and other languages. See www.immunize.org/vis Hojas de información sobre vacunas están disponibles en español y en muchos otros idiomas. Visite www.immunize.org/vis 1. Why get vaccinated? Influenza vaccine can prevent influenza (flu). Flu is a contagious disease that spreads around the [facility] every year, usually between October and May. Anyone can get the flu, but it is more dangerous for some people. Infants and young children, people 72years of age and older, pregnant women, and people with certain health conditions or a weakened immune system are at greatest risk of flu complications. Pneumonia, bronchitis, sinus infections and ear infections are examples of flu-related complications. If you have a medical condition, such as heart disease, cancer or diabetes, flu can make it worse. Flu can cause fever and chills, sore throat, muscle aches, fatigue, cough, headache, and runny or stuffy nose. Some people may have vomiting and diarrhea, though this is more common in children than adults. Each year thousands of people in the [facility] die from flu, and many more are hospitalized. Flu vaccine prevents millions of illnesses and flu-related visits to the doctor each year. 2. Influenza vaccines CDC recommends everyone 10months of age and older get vaccinated every flu season. Children 6 months through 6years of age may need 2 doses during a single flu season. Everyone else needs only 1 dose each flu season. It takes about 2 weeks for protection to develop after vaccination. There are many flu viruses, and they are always changing. Each year a new flu vaccine is made to protect against three or four viruses that are likely to cause disease in the upcoming flu season.  Even when the vaccine doesnt exactly match these viruses, it may still provide some protection. Influenza vaccine does not cause flu. Influenza vaccine may be given at the same time as other vaccines. 3. Talk with your health care provider Tell your vaccine provider if the person getting the vaccine: 
 Has had an allergic reaction after a previous dose of influenza vaccine, or has any severe, life-threatening allergies.  Has ever had Guillain-Barré Syndrome (also called GBS). In some cases, your health care provider may decide to postpone influenza vaccination to a future visit. People with minor illnesses, such as a cold, may be vaccinated. People who are moderately or severely ill should usually wait until they recover before getting influenza vaccine. Your health care provider can give you more information. 4. Risks of a reaction  Soreness, redness, and swelling where shot is given, fever, muscle aches, and headache can happen after influenza vaccine.  There may be a very small increased risk of Guillain-Barré Syndrome (GBS) after inactivated influenza vaccine (the flu shot). [facility] children who get the flu shot along with pneumococcal vaccine (PCV13), and/or DTaP vaccine at the same time might be slightly more likely to have a seizure caused by fever. Tell your health care provider if a child who is getting flu vaccine has ever had a seizure. People sometimes faint after medical procedures, including vaccination. Tell your provider if you feel dizzy or have vision changes or ringing in the ears. As with any medicine, there is a very remote chance of a vaccine causing a severe allergic reaction, other serious injury, or death. 5. What if there is a serious problem? An allergic reaction could occur after the vaccinated person leaves the clinic.  If you see signs of a severe allergic reaction (hives, swelling of the face and throat, difficulty breathing, a fast heartbeat, dizziness, or weakness), call 9-1-1 and get the person to the nearest hospital. 
 
For other signs that concern you, call your health care provider. Adverse reactions should be reported to the Vaccine Adverse Event Reporting System (VAERS). Your health care provider will usually file this report, or you can do it yourself. Visit the VAERS website at www.vaers. hhs.gov or call 8-176.858.2665. VAERS is only for reporting reactions, and VAERS staff do not give medical advice. 6. The National Vaccine Injury Compensation Program 
 
The [facility] Vaccine Injury Compensation Program (VICP) is a federal program that was created to compensate people who may have been injured by certain vaccines. Visit the VICP website at www.hrsa.gov/vaccinecompensation or call 7-849.573.3347 to learn about the program and about filing a claim. There is a time limit to file a claim for compensation. 7. How can I learn more?  Ask your health care provider.  Call your local or state health department.  Contact the Centers for Disease Control and Prevention (CDC): 
- Call 5-425.299.5043 (1-800-CDC-INFO) or 
- Visit CDCs influenza website at www.cdc.gov/flu Vaccine Information Statement (Interim) Inactivated Influenza Vaccine 8/15/2019 
42 MARIE Mistry [id number] Department of Health and [employer] Centers for Disease Control and Prevention Office Use Only

## 2019-11-13 NOTE — PROGRESS NOTES
Chief Complaint   Patient presents with    Anxiety    Depression     Check meds & labs    1. Have you been to the ER, urgent care clinic since your last visit? Hospitalized since your last visit? no    2. Have you seen or consulted any other health care providers outside of the [address] [place] [address] since your last visit? Include any pap smears or colon screening. yes    Order placed for Flulaval, per Verbal Order from Dr. Chuck Esqueda on 11/13/2019 due to need for immunization. Charlie Myers is a 38 y.o. female who presents for routine immunizations. She denies any symptoms , reactions or allergies that would exclude them from being immunized today. Risks and adverse reactions were discussed and the VIS was given to them. All questions were addressed. She was observed for 15 min post injection. There were no reactions observed.     Jammie Alejo LPN

## 2019-11-14 RX ORDER — DULOXETIN HYDROCHLORIDE 30 MG/1
30 CAPSULE, DELAYED RELEASE ORAL DAILY
Qty: 90 CAP | Refills: 0 | OUTPATIENT
Start: 2019-11-14

## 2019-11-14 RX ORDER — HYDROCHLOROTHIAZIDE 25 MG/1
TABLET ORAL
Qty: 90 TAB | Refills: 0 | Status: SHIPPED | OUTPATIENT
Start: 2019-11-14 | End: 2019-12-27 | Stop reason: SDUPTHER

## 2019-11-14 RX ORDER — LOSARTAN POTASSIUM 100 MG/1
TABLET ORAL
Qty: 90 TAB | Refills: 1 | Status: SHIPPED | OUTPATIENT
Start: 2019-11-14 | End: 2020-02-05 | Stop reason: SDUPTHER

## 2019-11-14 RX ORDER — FLUTICASONE PROPIONATE AND SALMETEROL 250; 50 UG/1; UG/1
1 POWDER RESPIRATORY (INHALATION) EVERY 12 HOURS
Qty: 3 EACH | Refills: 0 | Status: SHIPPED | OUTPATIENT
Start: 2019-11-14 | End: 2020-12-10

## 2019-11-14 RX ORDER — OMEPRAZOLE 40 MG/1
CAPSULE, DELAYED RELEASE ORAL
Qty: 90 CAP | Refills: 0 | Status: SHIPPED | OUTPATIENT
Start: 2019-11-14 | End: 2020-02-05 | Stop reason: SDUPTHER

## 2019-11-14 RX ORDER — CETIRIZINE HCL 10 MG
TABLET ORAL
Qty: 90 TAB | Refills: 0 | Status: SHIPPED | OUTPATIENT
Start: 2019-11-14 | End: 2020-02-05 | Stop reason: SDUPTHER

## 2019-11-14 RX ORDER — FLUTICASONE PROPIONATE 50 MCG
2 SPRAY, SUSPENSION (ML) NASAL DAILY
Qty: 3 BOTTLE | Refills: 0 | Status: SHIPPED | OUTPATIENT
Start: 2019-11-14 | End: 2021-04-16 | Stop reason: SDUPTHER

## 2019-12-27 ENCOUNTER — OFFICE VISIT (OUTPATIENT)
Dept: FAMILY MEDICINE CLINIC | Age: 44
End: 2019-12-27

## 2019-12-27 VITALS
TEMPERATURE: 96.5 F | HEIGHT: 66 IN | RESPIRATION RATE: 16 BRPM | BODY MASS INDEX: 47.09 KG/M2 | OXYGEN SATURATION: 99 % | WEIGHT: 293 LBS | DIASTOLIC BLOOD PRESSURE: 87 MMHG | SYSTOLIC BLOOD PRESSURE: 120 MMHG | HEART RATE: 70 BPM

## 2019-12-27 RX ORDER — ALBUTEROL SULFATE 90 UG/1
2 AEROSOL, METERED RESPIRATORY (INHALATION)
Qty: 1 INHALER | Refills: 2 | Status: SHIPPED | OUTPATIENT
Start: 2019-12-27 | End: 2020-06-11 | Stop reason: SDUPTHER

## 2019-12-27 RX ORDER — TIZANIDINE HYDROCHLORIDE 4 MG/1
CAPSULE, GELATIN COATED ORAL
COMMUNITY
Start: 2019-11-05 | End: 2020-07-07 | Stop reason: ALTCHOICE

## 2019-12-27 RX ORDER — IBUPROFEN 800 MG/1
TABLET ORAL
COMMUNITY
Start: 2019-11-22 | End: 2022-07-25 | Stop reason: ALTCHOICE

## 2019-12-27 NOTE — PROGRESS NOTES
Chief Complaint   Patient presents with    Anxiety    Depression     3 week check    1. Have you been to the ER, urgent care clinic since your last visit? Hospitalized since your last visit? no    2. Have you seen or consulted any other health care providers outside of the [address] [place] [address] since your last visit? Include any pap smears or colon screening. yes      Had to leave to  child.  appt scheduled for 1-13-20

## 2019-12-30 NOTE — PROGRESS NOTES
She had to leave early. Her child got out of school. Apt cancelled today.        Kassy Conde MD    12/30/2019

## 2020-01-13 ENCOUNTER — OFFICE VISIT (OUTPATIENT)
Dept: FAMILY MEDICINE CLINIC | Age: 45
End: 2020-01-13

## 2020-01-13 VITALS
WEIGHT: 293 LBS | OXYGEN SATURATION: 96 % | SYSTOLIC BLOOD PRESSURE: 139 MMHG | TEMPERATURE: 98.5 F | BODY MASS INDEX: 47.09 KG/M2 | HEIGHT: 66 IN | RESPIRATION RATE: 16 BRPM | HEART RATE: 67 BPM | DIASTOLIC BLOOD PRESSURE: 85 MMHG

## 2020-01-13 DIAGNOSIS — I10 ESSENTIAL HYPERTENSION: ICD-10-CM

## 2020-01-13 DIAGNOSIS — Z23 ENCOUNTER FOR IMMUNIZATION: ICD-10-CM

## 2020-01-13 DIAGNOSIS — Z13.220 SCREENING CHOLESTEROL LEVEL: ICD-10-CM

## 2020-01-13 DIAGNOSIS — F41.8 DEPRESSION WITH ANXIETY: ICD-10-CM

## 2020-01-13 DIAGNOSIS — J45.20 MILD INTERMITTENT ASTHMA WITHOUT COMPLICATION: ICD-10-CM

## 2020-01-13 DIAGNOSIS — Z91.199 NONCOMPLIANCE: ICD-10-CM

## 2020-01-13 DIAGNOSIS — Z00.00 ANNUAL PHYSICAL EXAM: Primary | ICD-10-CM

## 2020-01-13 DIAGNOSIS — M48.061 FORAMINAL STENOSIS OF LUMBAR REGION: ICD-10-CM

## 2020-01-13 DIAGNOSIS — Z13.1 SCREENING FOR DIABETES MELLITUS: ICD-10-CM

## 2020-01-13 DIAGNOSIS — D84.9 IMMUNOSUPPRESSED STATUS (HCC): ICD-10-CM

## 2020-01-13 DIAGNOSIS — Z79.899 ENCOUNTER FOR LONG-TERM (CURRENT) USE OF MEDICATIONS: ICD-10-CM

## 2020-01-13 NOTE — PROGRESS NOTES
38 y.o. female for annual routine checkup. No LMP recorded. Patient has had an ablation. has a past medical history of BMI 45.0-49.9, adult ([facility] (2/14/2018), Essential hypertension (4/17/2018), Gastroesophageal reflux disease without esophagitis (3/16/2017), H/O tubal ligation (3/16/2017), Iron deficiency anemia due to chronic blood loss (3/16/2017), Menorrhagia with regular cycle (4/3/2017), Migraine without aura and without status migrainosus, not intractable (3/16/2017), Mild intermittent asthma without complication (0/42/1614), [facility] (10/08), Obesity, Class III, BMI 40-49.9 (morbid obesity) ([facility] (12/3/2015), S/P endometrial ablation (4/16/2018), and Vitamin D deficiency (3/16/2017). depression and anxiety  Contributing cause: Her health, chronic pain, weight gain, her mom in the nursing home. Sleep too much, chronic fatigue, cries often. Denies tam, visual, auditory or belief hallucination  Cousin is bipolar, mother had depression. This started about 6 months ago. Neuro have tried to put her on prozac but it just made her \"blank out\". Visit before; Given her chronic pain and back pain, we started cymbalta; she report feels no difference. All above symptoms still there. Doesn't help with pain. Last visit we d/c cymbalta and Started wellbutrin. - she took it for 1 month, didn't think it helped so she stopped on her own. Currently she thinks she's doing alright. Have been working the whole time. Stress is \"ok, been dealing with it\". Denies SI or HI.      Insomnia; last visit we started trazodone prn qhs  However she's not taking it. Says sleep is ok now. HTN: BP at goal today. Last visit we up losartan dose   Continue course, HCTZ 25mg and Losartan 100mg. Chronic low back pain seeing pain specialist, had steroid injection. S/p nerve ablation on one side. So still have burning on the other side. Gabapentin 300mg written by Orthopedic. She up it herself to BID.      Neuro at [facility]  Migraine HA  MS managed               Gabapentin filled by her Neurologist     Saw OBGYN: had papsmear there 05/2019  Mammogram done 06/2019    Reviewed: active problem list, medication list, allergies, notes from last encounter      Patient Active Problem List    Diagnosis Date Noted    Depression, major, recurrent, mild ([facility] 11/13/2019    Encounter for long-term (current) use of medications 01/04/2019    [facility] 06/21/2018    Essential hypertension 04/17/2018    S/P endometrial ablation 04/16/2018    BMI 45.0-49.9, adult ([facility] 02/14/2018    Menorrhagia with regular cycle 04/03/2017    Mild intermittent asthma without complication 85/15/2976    Vitamin D deficiency 03/16/2017    Iron deficiency anemia due to chronic blood loss 03/16/2017    Migraine without aura and without status migrainosus, not intractable 03/16/2017    H/O tubal ligation 03/16/2017    Gastroesophageal reflux disease without esophagitis 03/16/2017    Obesity, Class III, BMI 40-49.9 (morbid obesity) ([facility] 12/03/2015    Heavy menstrual period 03/27/2013    Anemia 03/27/2013    Multiple sclerosis (HCC) 08/10/2011     Current Outpatient Medications   Medication Sig Dispense Refill    ibuprofen (MOTRIN) 800 mg tablet TK 1 T PO TID      tiZANidine (ZANAFLEX) 4 mg capsule TK 1 TO 2 CS PO 2 HOURS PRIOR TO PROCEDURES      albuterol (PROVENTIL HFA) 90 mcg/actuation inhaler Take 2 Puffs by inhalation every four (4) hours as needed for Wheezing (and cough). 1 Inhaler 2    losartan (COZAAR) 100 mg tablet TAKE 1 TABLET BY MOUTH DAILY 90 Tab 1    cetirizine (ZYRTEC) 10 mg tablet TAKE 1 TABLET BY MOUTH EVERY DAY 90 Tab 0    omeprazole (PRILOSEC) 40 mg capsule TAKE 1 CAPSULE BY MOUTH DAILY 90 Cap 0    fluticasone propion-salmeterol (ADVAIR DISKUS) 250-50 mcg/dose diskus inhaler Take 1 Puff by inhalation every twelve (12) hours.  3 Each 0    fluticasone propionate (FLONASE) 50 mcg/actuation nasal spray 2 Sprays by Both Nostrils route daily. 3 Bottle 0    gabapentin (NEURONTIN) 300 mg capsule Take 1 Cap by mouth nightly. 30 Cap 1    ocrelizumab (OCREVUS) 30 mg/mL soln injection by IntraVENous route.  hydroCHLOROthiazide (HYDRODIURIL) 25 mg tablet TAKE 1 TABLET BY MOUTH DAILY 90 Tab 1    ergocalciferol (ERGOCALCIFEROL) 50,000 unit capsule Take 1 Cap by mouth every seven (7) days. TAKE 1 CAPSULE BY MOUTH EVERY 7 DAYS 12 Cap 1    Nebulizer Accessories kit Per patient's needs. 1 Kit 0    buPROPion XL (WELLBUTRIN XL) 150 mg tablet Take 1 Tab by mouth every morning. 30 Tab 0    traZODone (DESYREL) 50 mg tablet Take 1 Tab by mouth nightly. 30 Tab 1    butalbital-acetaminophen-caffeine (FIORICET, ESGIC) -40 mg per tablet Take 1 Tab by mouth every six (6) hours as needed for Pain for up to 30 doses. Max 6 /24 hours 30 Tab 0    albuterol (PROVENTIL VENTOLIN) 2.5 mg /3 mL (0.083 %) nebulizer solution 3 mL by Nebulization route every four (4) hours as needed for Wheezing. 200 Each 5     Allergies   Allergen Reactions    Tysabri [Natalizumab] Anaphylaxis     Past Surgical History:   Procedure Laterality Date    HX  SECTION      x3; , , &     LAP,TUBAL CAUTERY  2005     Family History   Problem Relation Age of Onset    Diabetes Mother     Hypertension Mother      Social History     Tobacco Use    Smoking status: Never Smoker    Smokeless tobacco: Never Used   Substance Use Topics    Alcohol use: Yes     Alcohol/week: 1.7 standard drinks     Types: 1 Glasses of wine, 1 Shots of liquor per week     Comment: socially        ROS:  Feeling well. No dyspnea or chest pain on exertion. No abdominal pain, change in bowel habits, black or bloody stools. No urinary tract symptoms.  GYN ROS: no menses s/p ablation, no abnormal bleeding, pelvic pain or discharge, no breast pain or new or enlarging lumps on self exam. No neurological complaints. Objective:     Visit Vitals  /85   Pulse 67   Temp 98.5 °F (36.9 °C) (Oral)   Resp 16   Ht 5' 6\" (1.676 m)   Wt 321 lb (145.6 kg)   SpO2 96%   BMI 51.81 kg/m²     General:  Alert, cooperative, no distress, appears stated age. Head:  Normocephalic, without obvious abnormality, atraumatic. Eyes:  Conjunctivae/corneas clear. PERRL, EOMs intact. Neck: Supple, symmetrical, trachea midline, no carotid bruit and no JVD. Lungs:   Clear to auscultation bilaterally. Heart:  Regular rate and rhythm, S1, S2 normal, no murmur, click, rub or gallop. Abdomen:   Soft, non-tender. Extremities: Extremities normal, atraumatic, no cyanosis or edema. Pulses: 2+ and symmetric all extremities. Neurologic: CNII-XII intact. Normal strength, sensation and reflexes throughout. Assessment/Plan:     Diagnoses and all orders for this visit:    1. Annual physical exam  -     CBC W/O DIFF  -     HEMOGLOBIN A1C W/O EAG  -     LIPID PANEL  -     METABOLIC PANEL, COMPREHENSIVE  -     TSH 3RD GENERATION  -     URINALYSIS W/ RFLX MICROSCOPIC  -     HIV 1/2 AG/AB, 4TH GENERATION,W RFLX CONFIRM    2. Depression with anxiety  -     METABOLIC PANEL, COMPREHENSIVE  -     TSH 3RD GENERATION    3. Essential hypertension  -     METABOLIC PANEL, COMPREHENSIVE  -     TSH 3RD GENERATION    4. Foraminal stenosis of lumbar region    5. Mild intermittent asthma without complication    6. Noncompliance    7. Screening for diabetes mellitus  -     HEMOGLOBIN A1C W/O EAG    8. Screening cholesterol level  -     LIPID PANEL    9. Encounter for long-term (current) use of medications  -     CBC W/O DIFF  -     HEMOGLOBIN A1C W/O EAG  -     LIPID PANEL  -     METABOLIC PANEL, COMPREHENSIVE  -     TSH 3RD GENERATION  -     URINALYSIS W/ RFLX MICROSCOPIC  -     HIV 1/2 AG/AB, 4TH GENERATION,W RFLX CONFIRM    10.  Immunosuppressed status ([facility]  -     PNEUMOCOCCAL POLYSACCHARIDE VACCINE, 23-VALENT, ADULT OR IMMUNOSUPPRESSED PT DOSE,  -     IA IMMUNIZ ADMIN,1 SINGLE/COMB VAC/TOXOID    11.  Encounter for immunization  -     PNEUMOCOCCAL POLYSACCHARIDE VACCINE, 23-VALENT, ADULT OR IMMUNOSUPPRESSED PT DOSE,  -     IA IMMUNIZ ADMIN,1 SINGLE/COMB VAC/TOXOID      Follow-up and Dispositions    · Return in about 4 months (around 5/13/2020) for depression, HTN, meds, sooner if labs abnormal.     .      Bev Marie MD  1/21/2020

## 2020-01-13 NOTE — PATIENT INSTRUCTIONS
Vaccine Information Statement    Pneumococcal Polysaccharide Vaccine (PPSV23): What You Need to Know    Many Vaccine Information Statements are available in [place] and other languages. See www.immunize.org/vis  Hojas de información sobre vacunas están disponibles en español y en muchos otros idiomas. Visite www.immunize.org/vis    1. Why get vaccinated? Pneumococcal polysaccharide vaccine (PPSV23) can prevent pneumococcal disease. Pneumococcal disease refers to any illness caused by pneumococcal bacteria. These bacteria can cause many types of illnesses, including pneumonia, which is an infection of the lungs. Pneumococcal bacteria are one of the most common causes of pneumonia. Besides pneumonia, pneumococcal bacteria can also cause:   Ear infections   Sinus infections   Meningitis (infection of the tissue covering the brain and spinal cord)   Bacteremia (bloodstream infection)    Anyone can get pneumococcal disease, but children under 3years of age, people with certain medical conditions, adults 72 years or older, and cigarette smokers are at the highest risk. Most pneumococcal infections are mild. However, some can result in long-term problems, such as brain damage or hearing loss. Meningitis, bacteremia, and pneumonia caused by pneumococcal disease can be fatal.     2. PPSV23     PPSV23 protects against 23 types of bacteria that cause pneumococcal disease. PPSV23 is recommended for:   All adults 72 years or older,   Anyone 2 years or older with certain medical conditions that can lead to an increased risk for pneumococcal disease. Most people need only one dose of PPSV23. A second dose of PPSV23, and another type of pneumococcal vaccine called PCV13, are recommended for certain high-risk groups. Your health care provider can give you more information.     People 65 years or older should get a dose of PPSV23 even if they have already gotten one or more doses of the vaccine before they turned 72.    3. Talk with your health care provider    Tell your vaccine provider if the person getting the vaccine:   Has had an allergic reaction after a previous dose of PPSV23, or has any severe, life-threatening allergies. In some cases, your health care provider may decide to postpone PPSV23 vaccination to a future visit. People with minor illnesses, such as a cold, may be vaccinated. People who are moderately or severely ill should usually wait until they recover before getting PPSV23. Your health care provider can give you more information. 4. Risks of a vaccine reaction     Redness or pain where the shot is given, feeling tired, fever, or muscle aches can happen after PPSV23. People sometimes faint after medical procedures, including vaccination. Tell your provider if you feel dizzy or have vision changes or ringing in the ears. As with any medicine, there is a very remote chance of a vaccine causing a severe allergic reaction, other serious injury, or death. 5. What if there is a serious problem? An allergic reaction could occur after the vaccinated person leaves the clinic. If you see signs of a severe allergic reaction (hives, swelling of the face and throat, difficulty breathing, a fast heartbeat, dizziness, or weakness), call 9-1-1 and get the person to the nearest hospital.    For other signs that concern you, call your health care provider. Adverse reactions should be reported to the Vaccine Adverse Event Reporting System (VAERS). Your health care provider will usually file this report, or you can do it yourself. Visit the VAERS website at www.vaers. hhs.gov or call 6-722.436.9002. VAERS is only for reporting reactions, and VAERS staff do not give medical advice. 6. How can I learn more?  Ask your health care provider.  Call your local or state health department.    Contact the Centers for Disease Control and Prevention (CDC):  - Call 9-802.800.7379 (0-788-HKJ-INFO) or  - Visit CDCs website at www.cdc.gov/vaccines    Vaccine Information Statement   PPSV23   10/30/2019    Formerly [facility] and [facility] for Disease Control and Prevention    Office Use Only

## 2020-01-13 NOTE — PROGRESS NOTES
Chief Complaint   Patient presents with    Depression         1. Have you been to the ER, urgent care clinic since your last visit? Hospitalized since your last visit? no    2. Have you seen or consulted any other health care providers outside of the [address] [place] [address] since your last visit? Include any pap smears or colon screening. yes      Order placed for PneumoVax 23, per Verbal Order from Dr. Soares Has on 1/13/2020 due to need for immunization. Burke Asher is a 38 y.o. female who presents for routine immunizations. She denies any symptoms , reactions or allergies that would exclude them from being immunized today. Risks and adverse reactions were discussed and the VIS was given to them. All questions were addressed. She was observed for 15 min post injection. There were no reactions observed.     Dallin Andrade LPN

## 2020-01-14 LAB
ALBUMIN SERPL-MCNC: 4.4 G/DL (ref 3.5–5.5)
ALBUMIN/GLOB SERPL: 1.6 {RATIO} (ref 1.2–2.2)
ALP SERPL-CCNC: 51 IU/L (ref 39–117)
ALT SERPL-CCNC: 34 IU/L (ref 0–32)
APPEARANCE UR: CLEAR
AST SERPL-CCNC: 29 IU/L (ref 0–40)
BILIRUB SERPL-MCNC: 0.3 MG/DL (ref 0–1.2)
BILIRUB UR QL STRIP: NEGATIVE
BUN SERPL-MCNC: 9 MG/DL (ref 6–24)
BUN/CREAT SERPL: 9 (ref 9–23)
CALCIUM SERPL-MCNC: 10 MG/DL (ref 8.7–10.2)
CHLORIDE SERPL-SCNC: 98 MMOL/L (ref 96–106)
CHOLEST SERPL-MCNC: 247 MG/DL (ref 100–199)
CO2 SERPL-SCNC: 27 MMOL/L (ref 20–29)
COLOR UR: YELLOW
CREAT SERPL-MCNC: 1 MG/DL (ref 0.57–1)
ERYTHROCYTE [DISTWIDTH] IN BLOOD BY AUTOMATED COUNT: 13.1 % (ref 11.7–15.4)
GLOBULIN SER CALC-MCNC: 2.7 G/DL (ref 1.5–4.5)
GLUCOSE SERPL-MCNC: 80 MG/DL (ref 65–99)
GLUCOSE UR QL: NEGATIVE
HBA1C MFR BLD: 5.1 % (ref 4.8–5.6)
HCT VFR BLD AUTO: 40.9 % (ref 34–46.6)
HDLC SERPL-MCNC: 58 MG/DL
HGB BLD-MCNC: 13.6 G/DL (ref 11.1–15.9)
HGB UR QL STRIP: NEGATIVE
HIV 1+2 AB+HIV1 P24 AG SERPL QL IA: NON REACTIVE
KETONES UR QL STRIP: NEGATIVE
LDLC SERPL CALC-MCNC: 149 MG/DL (ref 0–99)
LEUKOCYTE ESTERASE UR QL STRIP: NEGATIVE
MCH RBC QN AUTO: 30.5 PG (ref 26.6–33)
MCHC RBC AUTO-ENTMCNC: 33.3 G/DL (ref 31.5–35.7)
MCV RBC AUTO: 92 FL (ref 79–97)
MICRO URNS: NORMAL
NITRITE UR QL STRIP: NEGATIVE
PH UR STRIP: 6.5 [PH] (ref 5–7.5)
PLATELET # BLD AUTO: 397 X10E3/UL (ref 150–450)
POTASSIUM SERPL-SCNC: 4.1 MMOL/L (ref 3.5–5.2)
PROT SERPL-MCNC: 7.1 G/DL (ref 6–8.5)
PROT UR QL STRIP: NEGATIVE
RBC # BLD AUTO: 4.46 X10E6/UL (ref 3.77–5.28)
SODIUM SERPL-SCNC: 141 MMOL/L (ref 134–144)
SP GR UR: 1.01 (ref 1–1.03)
TRIGL SERPL-MCNC: 198 MG/DL (ref 0–149)
TSH SERPL DL<=0.005 MIU/L-ACNC: 2 UIU/ML (ref 0.45–4.5)
UROBILINOGEN UR STRIP-MCNC: 0.2 MG/DL (ref 0.2–1)
VLDLC SERPL CALC-MCNC: 40 MG/DL (ref 5–40)
WBC # BLD AUTO: 8 X10E3/UL (ref 3.4–10.8)

## 2020-02-05 DIAGNOSIS — E55.9 VITAMIN D DEFICIENCY: ICD-10-CM

## 2020-02-05 DIAGNOSIS — M48.061 FORAMINAL STENOSIS OF LUMBAR REGION: ICD-10-CM

## 2020-02-05 DIAGNOSIS — I10 ESSENTIAL HYPERTENSION: ICD-10-CM

## 2020-02-05 DIAGNOSIS — K21.9 GASTROESOPHAGEAL REFLUX DISEASE WITHOUT ESOPHAGITIS: ICD-10-CM

## 2020-02-06 RX ORDER — HYDROCHLOROTHIAZIDE 25 MG/1
TABLET ORAL
Qty: 90 TAB | Refills: 1 | Status: SHIPPED | OUTPATIENT
Start: 2020-02-06 | End: 2020-07-29

## 2020-02-06 RX ORDER — LOSARTAN POTASSIUM 100 MG/1
TABLET ORAL
Qty: 90 TAB | Refills: 1 | Status: SHIPPED | OUTPATIENT
Start: 2020-02-06 | End: 2020-07-29

## 2020-02-06 RX ORDER — ERGOCALCIFEROL 1.25 MG/1
50000 CAPSULE ORAL
Qty: 12 CAP | Refills: 1 | Status: SHIPPED | OUTPATIENT
Start: 2020-02-06 | End: 2020-07-22

## 2020-02-06 RX ORDER — CETIRIZINE HCL 10 MG
TABLET ORAL
Qty: 90 TAB | Refills: 0 | Status: SHIPPED | OUTPATIENT
Start: 2020-02-06 | End: 2020-05-01

## 2020-02-06 RX ORDER — OMEPRAZOLE 40 MG/1
CAPSULE, DELAYED RELEASE ORAL
Qty: 90 CAP | Refills: 0 | Status: SHIPPED | OUTPATIENT
Start: 2020-02-06 | End: 2020-05-04 | Stop reason: SDUPTHER

## 2020-02-06 RX ORDER — GABAPENTIN 300 MG/1
300 CAPSULE ORAL
Qty: 30 CAP | Refills: 1 | OUTPATIENT
Start: 2020-02-06

## 2020-02-06 NOTE — TELEPHONE ENCOUNTER
Marti,   i've refilled all her medication except gabapentin, that was given by Orthopedic and she increased frequency on her own and ran out.      Will not refill gabapentin, for her to call orthopedic    kareen Villegas MD  2/6/2020

## 2020-03-04 ENCOUNTER — OFFICE VISIT (OUTPATIENT)
Dept: FAMILY MEDICINE CLINIC | Age: 45
End: 2020-03-04

## 2020-03-04 VITALS
OXYGEN SATURATION: 97 % | SYSTOLIC BLOOD PRESSURE: 135 MMHG | WEIGHT: 293 LBS | RESPIRATION RATE: 16 BRPM | HEIGHT: 66 IN | DIASTOLIC BLOOD PRESSURE: 88 MMHG | TEMPERATURE: 97.4 F | HEART RATE: 69 BPM | BODY MASS INDEX: 47.09 KG/M2

## 2020-03-04 DIAGNOSIS — E78.00 PURE HYPERCHOLESTEROLEMIA: ICD-10-CM

## 2020-03-04 DIAGNOSIS — F41.8 DEPRESSION WITH ANXIETY: ICD-10-CM

## 2020-03-04 DIAGNOSIS — E66.01 MORBID OBESITY (HCC): ICD-10-CM

## 2020-03-04 DIAGNOSIS — I10 ESSENTIAL HYPERTENSION: ICD-10-CM

## 2020-03-04 DIAGNOSIS — Z71.3 WEIGHT LOSS COUNSELING, ENCOUNTER FOR: Primary | ICD-10-CM

## 2020-03-04 RX ORDER — PRAVASTATIN SODIUM 10 MG/1
10 TABLET ORAL
Qty: 90 TAB | Refills: 1 | Status: SHIPPED | OUTPATIENT
Start: 2020-03-04 | End: 2020-08-20

## 2020-03-04 RX ORDER — TOPIRAMATE 25 MG/1
25 TABLET ORAL 2 TIMES DAILY
Qty: 60 TAB | Refills: 2 | Status: SHIPPED | OUTPATIENT
Start: 2020-03-04 | End: 2020-09-25

## 2020-03-04 NOTE — PROGRESS NOTES
41 y.o. female for annual routine checkup. No LMP recorded. Patient has had an ablation. has a past medical history of BMI 45.0-49.9, adult ([facility] (2/14/2018), Essential hypertension (4/17/2018), Gastroesophageal reflux disease without esophagitis (3/16/2017), H/O tubal ligation (3/16/2017), Iron deficiency anemia due to chronic blood loss (3/16/2017), Menorrhagia with regular cycle (4/3/2017), Migraine without aura and without status migrainosus, not intractable (3/16/2017), Mild intermittent asthma without complication (7/66/0348), MS (multiple sclerosis) ([facility] (10/08), Obesity, Class III, BMI 40-49.9 (morbid obesity) ([facility] (12/3/2015), S/P endometrial ablation (4/16/2018), and Vitamin D deficiency (3/16/2017). HLD: we'll start pravastatin, pt report was on statin before and able to tolerate. Weight loss counseling. Discussed calories count, goal for 1400cal/day. Exercise. Choice of food. topamax 25mg BID w meal    depression and anxiety  We've tried a couple of meds, she was noncompliant with them. Doesn't want anything. Report doing fine  Denies SI or HI. She doesn't want refer to Psych.      Insomnia; last visit we started trazodone prn qhs  However she's not taking it. Says sleep is ok now. HTN: BP at goal today. Last visit we up losartan dose   Continue course, HCTZ 25mg and Losartan 100mg. Chronic low back pain seeing pain specialist, had steroid injection. S/p nerve ablation on one side. So still have burning on the other side. Gabapentin 300mg written by Orthopedic.  She up it herself to BID.      Neuro at [facility]  Migraine HA  MS managed               Gabapentin filled by her Neurologist     Saw OBGYN: had papsmear there 05/2019  Mammogram done 06/2019    Reviewed: active problem list, medication list, allergies, notes from last encounter      Patient Active Problem List    Diagnosis Date Noted    Depression, major, recurrent, mild ([facility] 11/13/2019    Encounter for long-term (current) use of medications 01/04/2019    Acute relapsing multiple sclerosis ([facility] 06/21/2018    Essential hypertension 04/17/2018    S/P endometrial ablation 04/16/2018    BMI 45.0-49.9, adult ([facility] 02/14/2018    Menorrhagia with regular cycle 04/03/2017    Mild intermittent asthma without complication 27/41/5773    Vitamin D deficiency 03/16/2017    Iron deficiency anemia due to chronic blood loss 03/16/2017    Migraine without aura and without status migrainosus, not intractable 03/16/2017    H/O tubal ligation 03/16/2017    Gastroesophageal reflux disease without esophagitis 03/16/2017    Obesity, Class III, BMI 40-49.9 (morbid obesity) ([facility] 12/03/2015    Heavy menstrual period 03/27/2013    Anemia 03/27/2013    Multiple sclerosis ([facility] 08/10/2011     Current Outpatient Medications   Medication Sig Dispense Refill    pravastatin (PRAVACHOL) 10 mg tablet Take 1 Tab by mouth nightly. 90 Tab 1    topiramate (TOPAMAX) 25 mg tablet Take 1 Tab by mouth two (2) times a day. 60 Tab 2    hydroCHLOROthiazide (HYDRODIURIL) 25 mg tablet TAKE 1 TABLET BY MOUTH DAILY 90 Tab 1    ergocalciferol (ERGOCALCIFEROL) 1,250 mcg (50,000 unit) capsule Take 1 Cap by mouth every seven (7) days. TAKE 1 CAPSULE BY MOUTH EVERY 7 DAYS 12 Cap 1    losartan (COZAAR) 100 mg tablet TAKE 1 TABLET BY MOUTH DAILY 90 Tab 1    cetirizine (ZYRTEC) 10 mg tablet TAKE 1 TABLET BY MOUTH EVERY DAY 90 Tab 0    omeprazole (PRILOSEC) 40 mg capsule TAKE 1 CAPSULE BY MOUTH DAILY 90 Cap 0    ibuprofen (MOTRIN) 800 mg tablet TK 1 T PO TID      albuterol (PROVENTIL HFA) 90 mcg/actuation inhaler Take 2 Puffs by inhalation every four (4) hours as needed for Wheezing (and cough). 1 Inhaler 2    fluticasone propion-salmeterol (ADVAIR DISKUS) 250-50 mcg/dose diskus inhaler Take 1 Puff by inhalation every twelve (12) hours.  3 Each 0    fluticasone propionate (FLONASE) 50 mcg/actuation nasal spray 2 Sprays by Both Nostrils route daily. 3 Bottle 0    gabapentin (NEURONTIN) 300 mg capsule Take 1 Cap by mouth nightly. 30 Cap 1    ocrelizumab (OCREVUS) 30 mg/mL soln injection by IntraVENous route.  Nebulizer Accessories kit Per patient's needs. 1 Kit 0    albuterol (PROVENTIL VENTOLIN) 2.5 mg /3 mL (0.083 %) nebulizer solution 3 mL by Nebulization route every four (4) hours as needed for Wheezing. 200 Each 5    tiZANidine (ZANAFLEX) 4 mg capsule TK 1 TO 2 CS PO 2 HOURS PRIOR TO PROCEDURES      butalbital-acetaminophen-caffeine (FIORICET, ESGIC) -40 mg per tablet Take 1 Tab by mouth every six (6) hours as needed for Pain for up to 30 doses. Max 6 /24 hours 30 Tab 0     Allergies   Allergen Reactions    Tysabri [Natalizumab] Anaphylaxis     Past Surgical History:   Procedure Laterality Date    HX  SECTION      x3; , , &     LAP,TUBAL CAUTERY       Family History   Problem Relation Age of Onset    Diabetes Mother     Hypertension Mother      Social History     Tobacco Use    Smoking status: Never Smoker    Smokeless tobacco: Never Used   Substance Use Topics    Alcohol use: Yes     Alcohol/week: 1.7 standard drinks     Types: 1 Glasses of wine, 1 Shots of liquor per week     Comment: socially        ROS:  Feeling well. No dyspnea or chest pain on exertion. No abdominal pain, change in bowel habits, black or bloody stools. No urinary tract symptoms. GYN ROS: no menses s/p ablation, no abnormal bleeding, pelvic pain or discharge, no breast pain or new or enlarging lumps on self exam. No neurological complaints. Objective:     Visit Vitals  /88   Pulse 69   Temp 97.4 °F (36.3 °C) (Oral)   Resp 16   Ht 5' 6\" (1.676 m)   Wt 318 lb 9.6 oz (144.5 kg)   SpO2 97%   BMI 51.42 kg/m²     General:  Alert, cooperative, no distress, appears stated age. Head:  Normocephalic, without obvious abnormality, atraumatic. Eyes:  Conjunctivae/corneas clear. PERRL, EOMs intact.     Neck: Supple, symmetrical, trachea midline, no carotid bruit and no JVD. Lungs:   Clear to auscultation bilaterally. Heart:  Regular rate and rhythm, S1, S2 normal, no murmur, click, rub or gallop. Abdomen:   Soft, non-tender. Extremities: Extremities normal, atraumatic, no cyanosis or edema. Pulses: 2+ and symmetric all extremities. Neurologic: CNII-XII intact. Normal strength, sensation and reflexes throughout. Assessment/Plan:     Diagnoses and all orders for this visit:    1. Weight loss counseling, encounter for    2. Morbid obesity (HCC)  -     topiramate (TOPAMAX) 25 mg tablet; Take 1 Tab by mouth two (2) times a day. 3. Pure hypercholesterolemia  -     pravastatin (PRAVACHOL) 10 mg tablet; Take 1 Tab by mouth nightly. 4. Essential hypertension    5. Depression with anxiety  -     topiramate (TOPAMAX) 25 mg tablet; Take 1 Tab by mouth two (2) times a day. Follow-up and Dispositions    · Return in about 3 months (around 6/4/2020) for Weight loss, HTN, HLD.          Mike Gallardo MD  3/4/2020

## 2020-03-04 NOTE — PROGRESS NOTES
Chief Complaint   Patient presents with    Depression    Results    Weight Management         1. Have you been to the ER, urgent care clinic since your last visit? Hospitalized since your last visit? no    2. Have you seen or consulted any other health care providers outside of the [address] [place] [address] since your last visit? Include any pap smears or colon screening.  no

## 2020-03-13 RX ORDER — ACETAMINOPHEN 325 MG/1
650 TABLET ORAL
Status: DISCONTINUED | OUTPATIENT
Start: 2020-03-16 | End: 2020-03-17 | Stop reason: HOSPADM

## 2020-03-13 RX ORDER — ACETAMINOPHEN 325 MG/1
650 TABLET ORAL ONCE
Status: COMPLETED | OUTPATIENT
Start: 2020-03-16 | End: 2020-03-16

## 2020-03-13 RX ORDER — DIPHENHYDRAMINE HCL 25 MG
50 CAPSULE ORAL ONCE
Status: COMPLETED | OUTPATIENT
Start: 2020-03-16 | End: 2020-03-16

## 2020-03-13 RX ORDER — DIPHENHYDRAMINE HYDROCHLORIDE 50 MG/ML
50 INJECTION, SOLUTION INTRAMUSCULAR; INTRAVENOUS
Status: DISCONTINUED | OUTPATIENT
Start: 2020-03-16 | End: 2020-03-17 | Stop reason: HOSPADM

## 2020-03-16 ENCOUNTER — HOSPITAL ENCOUNTER (OUTPATIENT)
Dept: INFUSION THERAPY | Age: 45
Discharge: HOME | End: 2020-03-16
Payer: COMMERCIAL

## 2020-03-16 VITALS
TEMPERATURE: 99 F | BODY MASS INDEX: 51.6 KG/M2 | SYSTOLIC BLOOD PRESSURE: 116 MMHG | HEART RATE: 74 BPM | RESPIRATION RATE: 16 BRPM | OXYGEN SATURATION: 97 % | WEIGHT: 293 LBS | DIASTOLIC BLOOD PRESSURE: 74 MMHG

## 2020-03-16 PROCEDURE — 96413 CHEMO IV INFUSION 1 HR: CPT

## 2020-03-16 PROCEDURE — 96375 TX/PRO/DX INJ NEW DRUG ADDON: CPT

## 2020-03-16 PROCEDURE — 74011250636 HC RX REV CODE- 250/636: Performed by: PSYCHIATRY & NEUROLOGY

## 2020-03-16 PROCEDURE — 74011250637 HC RX REV CODE- 250/637: Performed by: PSYCHIATRY & NEUROLOGY

## 2020-03-16 PROCEDURE — 96415 CHEMO IV INFUSION ADDL HR: CPT

## 2020-03-16 RX ORDER — SODIUM CHLORIDE 0.9 % (FLUSH) 0.9 %
5-10 SYRINGE (ML) INJECTION AS NEEDED
Status: DISCONTINUED | OUTPATIENT
Start: 2020-03-16 | End: 2020-03-17 | Stop reason: HOSPADM

## 2020-03-16 RX ORDER — SODIUM CHLORIDE 9 MG/ML
25 INJECTION, SOLUTION INTRAVENOUS AS NEEDED
Status: DISCONTINUED | OUTPATIENT
Start: 2020-03-16 | End: 2020-03-17 | Stop reason: HOSPADM

## 2020-03-16 RX ADMIN — SODIUM CHLORIDE 25 ML/HR: 900 INJECTION, SOLUTION INTRAVENOUS at 09:31

## 2020-03-16 RX ADMIN — OCRELIZUMAB 600 MG: 300 INJECTION INTRAVENOUS at 10:10

## 2020-03-16 RX ADMIN — DIPHENHYDRAMINE HYDROCHLORIDE 50 MG: 25 CAPSULE ORAL at 09:29

## 2020-03-16 RX ADMIN — METHYLPREDNISOLONE SODIUM SUCCINATE 125 MG: 125 INJECTION, POWDER, FOR SOLUTION INTRAMUSCULAR; INTRAVENOUS at 09:35

## 2020-03-16 RX ADMIN — ACETAMINOPHEN 650 MG: 325 TABLET ORAL at 09:29

## 2020-03-16 RX ADMIN — Medication 10 ML: at 09:20

## 2020-03-16 NOTE — PROGRESS NOTES
66- Pt arrived to [facility] ambulatory in no acute distress for Ocrevus.  Assessment unremarkable except generalized fatigue and lower back pain. IV established in left AC without issue and positive blood return noted.     The following medications administered:  NS @ KVO  Tylenol 650 mg PO  Benadryl 50 mg PO  Solu-Medrol 125 mg IVP  Ocrevus 600 mg IV titrated starting at 40 ml/hr     Patient's ears began to feel itchy and she felt warm after rate increased to 120 ml/hr. Ocrevus paused and line flushed with normal saline. Patient felt better after this. Vitals stable. Patient comfortable with continuing infusion at rate of 80 ml/hr. Maintained infusion at 80 ml/hr until infusion completed. Patient Vitals for the past 12 hrs:   Temp Pulse Resp BP SpO2   03/16/20 1410 99 °F (37.2 °C) 74 16 116/74 97 %   03/16/20 1340 99.4 °F (37.4 °C) 70 16 105/67    03/16/20 1310 99.4 °F (37.4 °C) 66 16 113/66    03/16/20 1240 99.6 °F (37.6 °C) 64 16 106/67    03/16/20 1210 99.4 °F (37.4 °C) 68 16 112/75    03/16/20 1140 99.3 °F (37.4 °C) 75 16 132/85 98 %   03/16/20 1110 99.3 °F (37.4 °C) 73 16 127/79    03/16/20 1040 98.7 °F (37.1 °C) 74 16 121/79    03/16/20 0921 99.6 °F (37.6 °C) 85 16 116/80 98 %       1415- Pt tolerated treatment well.  IV flushed per policy and removed, 2x2 and coban placed.  Pt discharged ambulatory in no acute distress, accompanied by self. Next appointment 9/16/20.

## 2020-05-01 RX ORDER — CETIRIZINE HCL 10 MG
TABLET ORAL
Qty: 90 TAB | Refills: 0 | Status: SHIPPED | OUTPATIENT
Start: 2020-05-01 | End: 2020-07-29

## 2020-05-04 DIAGNOSIS — K21.9 GASTROESOPHAGEAL REFLUX DISEASE WITHOUT ESOPHAGITIS: ICD-10-CM

## 2020-05-04 RX ORDER — OMEPRAZOLE 40 MG/1
CAPSULE, DELAYED RELEASE ORAL
Qty: 90 CAP | Refills: 0 | Status: SHIPPED | OUTPATIENT
Start: 2020-05-04 | End: 2020-07-29

## 2020-05-04 NOTE — TELEPHONE ENCOUNTER
----- Message from Hugo Rogers sent at 5/4/2020  1:46 PM EDT -----  Regarding: Dr. Fidencio Green refill  Medication Refill    Caller (if not patient): Gabe Kimble       Relationship of caller (if not patient):      Best contact number(s):[phone]      Name of medication and dosage if known:omeprazole (PRILOSEC) 40 mg      Is patient out of this medication (yes/no):yes       Pharmacy name:[facility]     Pharmacy listed in chart? (yes/no):yes   Pharmacy phone number:      Details to clarify the request:      Hugo Rogers

## 2020-05-11 DIAGNOSIS — M48.061 FORAMINAL STENOSIS OF LUMBAR REGION: ICD-10-CM

## 2020-05-12 RX ORDER — GABAPENTIN 300 MG/1
CAPSULE ORAL
Qty: 30 CAP | OUTPATIENT
Start: 2020-05-12

## 2020-05-12 RX ORDER — GABAPENTIN 300 MG/1
CAPSULE ORAL
Qty: 90 CAP | OUTPATIENT
Start: 2020-05-12

## 2020-05-21 DIAGNOSIS — J45.20 MILD INTERMITTENT ASTHMA WITHOUT COMPLICATION: ICD-10-CM

## 2020-05-22 RX ORDER — ALBUTEROL SULFATE 0.83 MG/ML
2.5 SOLUTION RESPIRATORY (INHALATION)
Qty: 200 EACH | Refills: 0 | Status: SHIPPED | OUTPATIENT
Start: 2020-05-22 | End: 2021-11-09 | Stop reason: SDUPTHER

## 2020-06-05 ENCOUNTER — VIRTUAL VISIT (OUTPATIENT)
Dept: FAMILY MEDICINE CLINIC | Age: 45
End: 2020-06-05

## 2020-06-05 VITALS — BODY MASS INDEX: 47.09 KG/M2 | HEIGHT: 66 IN | WEIGHT: 293 LBS

## 2020-06-05 DIAGNOSIS — Z71.89 EDUCATED ABOUT INFECTION DUE TO SEVERE ACUTE RESPIRATORY SYNDROME CORONAVIRUS 2 (SARS-COV-2): ICD-10-CM

## 2020-06-05 DIAGNOSIS — I10 ESSENTIAL HYPERTENSION: ICD-10-CM

## 2020-06-05 DIAGNOSIS — Z71.3 WEIGHT LOSS COUNSELING, ENCOUNTER FOR: ICD-10-CM

## 2020-06-05 DIAGNOSIS — E78.00 PURE HYPERCHOLESTEROLEMIA: ICD-10-CM

## 2020-06-05 DIAGNOSIS — R05.9 COUGH: Primary | ICD-10-CM

## 2020-06-05 NOTE — PROGRESS NOTES
Chief Complaint   Patient presents with    Weight Management    Hypertension    Cholesterol Problem    Cough     wants steroid gets once a year     3 mo check    1. Have you been to the ER, urgent care clinic since your last visit? Hospitalized since your last visit? no    2. Have you seen or consulted any other health care providers outside of the [address] [place] [address] since your last visit? Include any pap smears or colon screening.  yes

## 2020-06-05 NOTE — PROGRESS NOTES
Consent: Gris Cao, who was seen by synchronous (real-time) audio-video technology, and/or her healthcare decision maker, is aware that this patient-initiated, Telehealth encounter on 6/5/2020 is a billable service, with coverage as determined by her insurance carrier. She is aware that she may receive a bill and has provided verbal consent to proceed: Yes. Gris Cao is a 41 y.o. female    No LMP recorded. Patient has had an ablation. Works at home     has a past medical history of BMI 45.0-49.9, adult ([facility] (2/14/2018), Essential hypertension (4/17/2018), Gastroesophageal reflux disease without esophagitis (3/16/2017), H/O tubal ligation (3/16/2017), Iron deficiency anemia due to chronic blood loss (3/16/2017), Menorrhagia with regular cycle (4/3/2017), Migraine without aura and without status migrainosus, not intractable (3/16/2017), Mild intermittent asthma without complication (4/55/2010), MS (multiple sclerosis) ([facility] (10/08), Obesity, Class III, BMI 40-49.9 (morbid obesity) ([facility] (12/3/2015), S/P endometrial ablation (4/16/2018), and Vitamin D deficiency (3/16/2017). Cough X 1 month. Denies fever, chills, myalgia. When she coughs feel SOB. Takes mucinex. She does take zyrtec daily and omeprazole. Advised for COVID 19 testing. To self isolate until known result. HTN: BP was at goal today.    Continue course, HCTZ 25mg and Losartan 100mg. HLD: last visit we started pravastatin, denies any side effect.     Weight loss counseling. Discussed calories count, goal for 1400cal/day. Exercise. Choice of food. topamax 25mg BID w meal     depression and anxiety  We've tried a couple of meds, she was noncompliant with them. Doesn't want anything. Report doing fine  Denies SI or HI. She doesn't want refer to Psych.      No more insomnia     Chronic low back pain seeing pain specialist, had steroid injection. S/p nerve ablation on one side.   So still have burning on the other side. Gabapentin 300mg written by Orthopedic. She up it herself to BID.      Neuro at [facility]  Migraine HA  MS managed               Gabapentin filled by her Neurologist      Reviewed: active problem list, medication list, allergies, notes from last encounter, lab results    A comprehensive review of systems was negative except for that written in the HPI. Assessment & Plan:   Diagnoses and all orders for this visit:    1. Cough    2. Educated about infection due to severe acute respiratory syndrome coronavirus 2 (SARS-CoV-2)    3. Essential hypertension    4. Pure hypercholesterolemia    5. Weight loss counseling, encounter for        Follow-up and Dispositions    · Return for 4 months for HTN, HLD, labs, sooner after COVID testing and if still coughing. I spent at least 15 minutes with this established patient, and >50% of the time was spent counseling and/or coordinating care regarding multiple concerns documented above  712  Subjective:   Eleonora Jalloh is a 41 y.o. female who was seen for Weight Management; Hypertension; Cholesterol Problem; and Cough (wants steroid gets once a year)      Prior to Admission medications    Medication Sig Start Date End Date Taking? Authorizing Provider   albuterol (PROVENTIL VENTOLIN) 2.5 mg /3 mL (0.083 %) nebu 3 mL by Nebulization route every four (4) hours as needed for Wheezing. 5/22/20  Yes Lilli Madera MD   Nebulizer Accessories kit Per patient's needs. 5/22/20  Yes Lilli Madera MD   omeprazole (PRILOSEC) 40 mg capsule TAKE 1 CAPSULE BY MOUTH DAILY 5/4/20  Yes Jasson Babin MD   cetirizine (ZYRTEC) 10 mg tablet TAKE 1 TABLET BY MOUTH EVERY DAY 5/1/20  Yes Lilli Madera MD   pravastatin (PRAVACHOL) 10 mg tablet Take 1 Tab by mouth nightly. 3/4/20  Yes Lilli Madera MD   topiramate (TOPAMAX) 25 mg tablet Take 1 Tab by mouth two (2) times a day.  3/4/20  Yes Lilli Madera MD   hydroCHLOROthiazide (HYDRODIURIL) 25 mg tablet TAKE 1 TABLET BY MOUTH DAILY 2/6/20  Yes Codi Andrade MD   ergocalciferol (ERGOCALCIFEROL) 1,250 mcg (50,000 unit) capsule Take 1 Cap by mouth every seven (7) days. TAKE 1 CAPSULE BY MOUTH EVERY 7 DAYS 2/6/20  Yes Codi Andrade MD   losartan (COZAAR) 100 mg tablet TAKE 1 TABLET BY MOUTH DAILY 2/6/20  Yes Codi Andrade MD   ibuprofen (MOTRIN) 800 mg tablet TK 1 T PO TID 11/22/19  Yes Provider, Historical   albuterol (PROVENTIL HFA) 90 mcg/actuation inhaler Take 2 Puffs by inhalation every four (4) hours as needed for Wheezing (and cough). 12/27/19  Yes Codi Andrade MD   fluticasone propion-salmeterol (ADVAIR DISKUS) 250-50 mcg/dose diskus inhaler Take 1 Puff by inhalation every twelve (12) hours. 11/14/19  Yes Codi Andrade MD   fluticasone propionate (FLONASE) 50 mcg/actuation nasal spray 2 Sprays by Both Nostrils route daily. 11/14/19  Yes Codi Andrade MD   gabapentin (NEURONTIN) 300 mg capsule Take 1 Cap by mouth nightly. 11/6/19  Yes Codi Andrade MD   ocrelizumab (OCREVUS) 30 mg/mL soln injection by IntraVENous route. Yes Provider, Historical   tiZANidine (ZANAFLEX) 4 mg capsule TK 1 TO 2 CS PO 2 HOURS PRIOR TO PROCEDURES 11/5/19   Provider, Historical   butalbital-acetaminophen-caffeine (FIORICET, ESGIC) -40 mg per tablet Take 1 Tab by mouth every six (6) hours as needed for Pain for up to 30 doses.  Max 6 /24 hours 6/23/18   Jeannie Coreas MD     Allergies   Allergen Reactions   Rhetta Cap [Natalizumab] Anaphylaxis             Objective:   Vital Signs: (As obtained by patient/caregiver at home)  Visit Vitals  Ht 5' 6\" (1.676 m)   Wt 321 lb 3.2 oz (145.7 kg)   BMI 51.84 kg/m²        Constitutional: [x] Appears well-developed and well-nourished [x] No apparent distress      [] Abnormal -     Mental status: [x] Alert and awake  [x] Oriented to person/place/time [x] Able to follow commands    [] Abnormal -     Eyes:   EOM    [x]  Normal    [] Abnormal -   Sclera  [x]  Normal    [] Abnormal - Discharge [x]  None visible   [] Abnormal -     HENT: [x] Normocephalic, atraumatic  [] Abnormal -   [] Mouth/Throat: Mucous membranes are moist    External Ears [x] Normal  [] Abnormal -    Neck: [x] No visualized mass [] Abnormal -     Pulmonary/Chest: [x] Respiratory effort normal   [x] No visualized signs of difficulty breathing or respiratory distress        [] Abnormal -      Musculoskeletal:   [x] Normal gait with no signs of ataxia         [x] Normal range of motion of neck        [] Abnormal -     Neurological:        [x] No Facial Asymmetry (Cranial nerve 7 motor function) (limited exam due to video visit)          [x] No gaze palsy        [] Abnormal -          Skin:        [x] No significant exanthematous lesions or discoloration noted on facial skin         [] Abnormal -            Psychiatric:       [x] Normal Affect [] Abnormal -        [x] No Hallucinations      We discussed the expected course, resolution and complications of the diagnosis(es) in detail. Medication risks, benefits, costs, interactions, and alternatives were discussed as indicated. I advised her to contact the office if her condition worsens, changes or fails to improve as anticipated. She expressed understanding with the diagnosis(es) and plan. Marcel Sanders is a 41 y.o. female being evaluated by a video visit encounter for concerns as above. A caregiver was present when appropriate. Due to this being a TeleHealth encounter (During [facility] public health emergency), evaluation of the following organ systems was limited: Vitals/Constitutional/EENT/Resp/CV/GI//MS/Neuro/Skin/Heme-Lymph-Imm.   Pursuant to the emergency declaration under the [address] [place] [address] and the [employer] Act, this Virtual  Visit was conducted, with patient's (and/or legal guardian's) consent, to reduce the patient's risk of exposure to COVID-19 and provide necessary medical care. Services were provided through a video synchronous discussion virtually to substitute for in-person clinic visit. Patient and provider were located at their individual homes.         Matthew Sage MD

## 2020-06-11 RX ORDER — ALBUTEROL SULFATE 90 UG/1
2 AEROSOL, METERED RESPIRATORY (INHALATION)
Qty: 1 INHALER | Refills: 2 | Status: SHIPPED | OUTPATIENT
Start: 2020-06-11 | End: 2021-11-09 | Stop reason: SDUPTHER

## 2020-06-29 DIAGNOSIS — M48.00 CENTRAL STENOSIS OF SPINAL CANAL: Primary | ICD-10-CM

## 2020-07-07 ENCOUNTER — VIRTUAL VISIT (OUTPATIENT)
Dept: FAMILY MEDICINE CLINIC | Age: 45
End: 2020-07-07

## 2020-07-07 DIAGNOSIS — R05.9 COUGH: ICD-10-CM

## 2020-07-07 DIAGNOSIS — J45.41 ASTHMA IN ADULT, MODERATE PERSISTENT, WITH ACUTE EXACERBATION: Primary | ICD-10-CM

## 2020-07-07 RX ORDER — MONTELUKAST SODIUM 10 MG/1
10 TABLET ORAL DAILY
Qty: 30 TAB | Refills: 2 | Status: SHIPPED | OUTPATIENT
Start: 2020-07-07 | End: 2021-11-09 | Stop reason: SDUPTHER

## 2020-07-07 RX ORDER — DOXYCYCLINE 100 MG/1
100 TABLET ORAL 2 TIMES DAILY
Qty: 20 TAB | Refills: 0 | Status: SHIPPED | OUTPATIENT
Start: 2020-07-07 | End: 2020-07-17

## 2020-07-07 RX ORDER — PREDNISONE 10 MG/1
TABLET ORAL
Qty: 24 TAB | Refills: 0 | Status: SHIPPED | OUTPATIENT
Start: 2020-07-07 | End: 2020-09-25 | Stop reason: ALTCHOICE

## 2020-07-07 NOTE — PROGRESS NOTES
Chief Complaint   Patient presents with    Cough     productive, thick clear       1. Have you been to the ER, urgent care clinic since your last visit? Hospitalized since your last visit? Yes [facility]    2. Have you seen or consulted any other health care providers outside of the [address] [place] [address] since your last visit? Include any pap smears or colon screening.  no

## 2020-07-07 NOTE — PROGRESS NOTES
Consent: Jane Snell, who was seen by synchronous (real-time) audio-video technology, and/or her healthcare decision maker, is aware that this patient-initiated, Telehealth encounter on 7/7/2020 is a billable service, with coverage as determined by her insurance carrier. She is aware that she may receive a bill and has provided verbal consent to proceed: Yes. Jane Snell is a 41 y.o. female    No LMP recorded. Patient has had an ablation. Works at home     has a past medical history of BMI 45.0-49.9, adult ([facility] (2/14/2018), Essential hypertension (4/17/2018), Gastroesophageal reflux disease without esophagitis (3/16/2017), H/O tubal ligation (3/16/2017), Iron deficiency anemia due to chronic blood loss (3/16/2017), Menorrhagia with regular cycle (4/3/2017), Migraine without aura and without status migrainosus, not intractable (3/16/2017), Mild intermittent asthma without complication (8/87/4384), [facility]) [facility] (10/08), Obesity, Class III, BMI 40-49.9 (morbid obesity) ([facility] (12/3/2015), S/P endometrial ablation (4/16/2018), and Vitamin D deficiency (3/16/2017). Cough X 2 month. Denies fever, chills, myalgia. Had been tested for COVID 19 on 6/9/2020 and was negative. Report had CXR and [facility] thought it was asthma, and was given 7 days of prednisone. Asthma moderate persistent  Already on advair and albuterol prn  We'll write for longer course of prednisone taper   Add azithromycin    Add singulair       Reviewed: active problem list, medication list, allergies, notes from last encounter, lab results    A comprehensive review of systems was negative except for that written in the HPI. Assessment & Plan:   Diagnoses and all orders for this visit:    1. Asthma in adult, moderate persistent, with acute exacerbation  -     predniSONE (DELTASONE) 10 mg tablet; 3 tabs daily for 4 days, then 2 tabs daily for 4 days, then 1 tab daily for 4days.   -     montelukast (SINGULAIR) 10 mg tablet; Take 1 Tab by mouth daily. -     doxycycline (ADOXA) 100 mg tablet; Take 1 Tab by mouth two (2) times a day for 10 days. 2. Cough  -     doxycycline (ADOXA) 100 mg tablet; Take 1 Tab by mouth two (2) times a day for 10 days. Follow-up and Dispositions    · Return in about 5 days (around 7/12/2020), or if symptoms worsen or fail to improve. I spent at least 15 minutes with this established patient, and >50% of the time was spent counseling and/or coordinating care regarding multiple concerns documented above  712  Subjective:   Libertad Street is a 41 y.o. female who was seen for Cough (productive, thick clear)      Prior to Admission medications    Medication Sig Start Date End Date Taking? Authorizing Provider   predniSONE (DELTASONE) 10 mg tablet 3 tabs daily for 4 days, then 2 tabs daily for 4 days, then 1 tab daily for 4days. 7/7/20  Yes Stephany Belle MD   montelukast (SINGULAIR) 10 mg tablet Take 1 Tab by mouth daily. 7/7/20  Yes Stephany Belle MD   doxycycline (ADOXA) 100 mg tablet Take 1 Tab by mouth two (2) times a day for 10 days. 7/7/20 7/17/20 Yes Stephany Belle MD   albuterol (Proventil HFA) 90 mcg/actuation inhaler Take 2 Puffs by inhalation every four (4) hours as needed for Wheezing (and cough). 6/11/20  Yes Stephany Belle MD   albuterol (PROVENTIL VENTOLIN) 2.5 mg /3 mL (0.083 %) nebu 3 mL by Nebulization route every four (4) hours as needed for Wheezing. 5/22/20  Yes Stephany Belle MD   Nebulizer Accessories kit Per patient's needs. 5/22/20  Yes Stephany Belle MD   omeprazole (PRILOSEC) 40 mg capsule TAKE 1 CAPSULE BY MOUTH DAILY 5/4/20  Yes Mohsen Babin MD   cetirizine (ZYRTEC) 10 mg tablet TAKE 1 TABLET BY MOUTH EVERY DAY 5/1/20  Yes Stephany Belle MD   pravastatin (PRAVACHOL) 10 mg tablet Take 1 Tab by mouth nightly. 3/4/20  Yes tSephany Belle MD   topiramate (TOPAMAX) 25 mg tablet Take 1 Tab by mouth two (2) times a day.  3/4/20  Yes Talya, Violeta Beck MD   hydroCHLOROthiazide (HYDRODIURIL) 25 mg tablet TAKE 1 TABLET BY MOUTH DAILY 2/6/20  Yes July Locke MD   ergocalciferol (ERGOCALCIFEROL) 1,250 mcg (50,000 unit) capsule Take 1 Cap by mouth every seven (7) days. TAKE 1 CAPSULE BY MOUTH EVERY 7 DAYS 2/6/20  Yes July Locke MD   losartan (COZAAR) 100 mg tablet TAKE 1 TABLET BY MOUTH DAILY 2/6/20  Yes July Locke MD   ibuprofen (MOTRIN) 800 mg tablet TK 1 T PO TID 11/22/19  Yes Provider, Historical   fluticasone propion-salmeterol (ADVAIR DISKUS) 250-50 mcg/dose diskus inhaler Take 1 Puff by inhalation every twelve (12) hours. 11/14/19  Yes July Locke MD   fluticasone propionate (FLONASE) 50 mcg/actuation nasal spray 2 Sprays by Both Nostrils route daily. 11/14/19  Yes July Locke MD   gabapentin (NEURONTIN) 300 mg capsule Take 1 Cap by mouth nightly. 11/6/19  Yes July Locke MD   ocrelizumab (OCREVUS) 30 mg/mL soln injection by IntraVENous route. Yes Provider, Historical   butalbital-acetaminophen-caffeine (FIORICET, ESGIC) -40 mg per tablet Take 1 Tab by mouth every six (6) hours as needed for Pain for up to 30 doses. Max 6 /24 hours 6/23/18   Amee Carvalho MD     Allergies   Allergen Reactions   Eluterio Pencil [Natalizumab] Anaphylaxis             Objective:   Vital Signs: (As obtained by patient/caregiver at home)  There were no vitals taken for this visit.      Constitutional: [x] Appears well-developed and well-nourished [x] No apparent distress      [] Abnormal -     Mental status: [x] Alert and awake  [x] Oriented to person/place/time [x] Able to follow commands    [] Abnormal -     Eyes:   EOM    [x]  Normal    [] Abnormal -   Sclera  [x]  Normal    [] Abnormal -          Discharge [x]  None visible   [] Abnormal -     HENT: [x] Normocephalic, atraumatic  [] Abnormal -   [] Mouth/Throat: Mucous membranes are moist    External Ears [x] Normal  [] Abnormal -    Neck: [x] No visualized mass [] Abnormal -     Pulmonary/Chest: [x] Respiratory effort normal   [x] No visualized signs of difficulty breathing or respiratory distress        [] Abnormal -      Musculoskeletal:   [x] Normal gait with no signs of ataxia         [x] Normal range of motion of neck        [] Abnormal -     Neurological:        [x] No Facial Asymmetry (Cranial nerve 7 motor function) (limited exam due to video visit)          [x] No gaze palsy        [] Abnormal -          Skin:        [x] No significant exanthematous lesions or discoloration noted on facial skin         [] Abnormal -            Psychiatric:       [x] Normal Affect [] Abnormal -        [x] No Hallucinations      We discussed the expected course, resolution and complications of the diagnosis(es) in detail. Medication risks, benefits, costs, interactions, and alternatives were discussed as indicated. I advised her to contact the office if her condition worsens, changes or fails to improve as anticipated. She expressed understanding with the diagnosis(es) and plan. Heidi Browne is a 41 y.o. female being evaluated by a video visit encounter for concerns as above. A caregiver was present when appropriate. Due to this being a TeleHealth encounter (During [facility]- public health emergency), evaluation of the following organ systems was limited: Vitals/Constitutional/EENT/Resp/CV/GI//MS/Neuro/Skin/Heme-Lymph-Imm. Pursuant to the emergency declaration under the [facility] [address] [facility] waiver authority and the [employer] and [employer] Act, this Virtual  Visit was conducted, with patient's (and/or legal guardian's) consent, to reduce the patient's risk of exposure to COVID-19 and provide necessary medical care. Services were provided through a video synchronous discussion virtually to substitute for in-person clinic visit. Patient and provider were located at their individual homes.         Hal Price MD

## 2020-07-22 DIAGNOSIS — E55.9 VITAMIN D DEFICIENCY: ICD-10-CM

## 2020-07-22 RX ORDER — ERGOCALCIFEROL 1.25 MG/1
CAPSULE ORAL
Qty: 12 CAP | Refills: 1 | Status: SHIPPED | OUTPATIENT
Start: 2020-07-22 | End: 2020-12-30

## 2020-07-29 DIAGNOSIS — K21.9 GASTROESOPHAGEAL REFLUX DISEASE WITHOUT ESOPHAGITIS: ICD-10-CM

## 2020-07-29 DIAGNOSIS — I10 ESSENTIAL HYPERTENSION: ICD-10-CM

## 2020-07-29 RX ORDER — HYDROCHLOROTHIAZIDE 25 MG/1
TABLET ORAL
Qty: 90 TAB | Refills: 1 | Status: SHIPPED | OUTPATIENT
Start: 2020-07-29 | End: 2021-01-19 | Stop reason: SDUPTHER

## 2020-07-29 RX ORDER — LOSARTAN POTASSIUM 100 MG/1
TABLET ORAL
Qty: 90 TAB | Refills: 1 | Status: SHIPPED | OUTPATIENT
Start: 2020-07-29 | End: 2021-01-18 | Stop reason: SDUPTHER

## 2020-07-29 RX ORDER — OMEPRAZOLE 40 MG/1
CAPSULE, DELAYED RELEASE ORAL
Qty: 90 CAP | Refills: 0 | Status: SHIPPED | OUTPATIENT
Start: 2020-07-29 | End: 2020-08-20

## 2020-07-29 RX ORDER — CETIRIZINE HCL 10 MG
TABLET ORAL
Qty: 90 TAB | Refills: 0 | Status: SHIPPED | OUTPATIENT
Start: 2020-07-29 | End: 2020-08-20

## 2020-08-20 DIAGNOSIS — E78.00 PURE HYPERCHOLESTEROLEMIA: ICD-10-CM

## 2020-08-20 DIAGNOSIS — K21.9 GASTROESOPHAGEAL REFLUX DISEASE WITHOUT ESOPHAGITIS: ICD-10-CM

## 2020-08-20 RX ORDER — OMEPRAZOLE 40 MG/1
CAPSULE, DELAYED RELEASE ORAL
Qty: 90 CAP | Refills: 0 | Status: SHIPPED | OUTPATIENT
Start: 2020-08-20 | End: 2021-01-18 | Stop reason: SDUPTHER

## 2020-08-20 RX ORDER — CETIRIZINE HCL 10 MG
TABLET ORAL
Qty: 90 TAB | Refills: 0 | Status: SHIPPED | OUTPATIENT
Start: 2020-08-20 | End: 2021-04-16 | Stop reason: SDUPTHER

## 2020-08-20 RX ORDER — PRAVASTATIN SODIUM 10 MG/1
TABLET ORAL
Qty: 90 TAB | Refills: 1 | Status: SHIPPED | OUTPATIENT
Start: 2020-08-20 | End: 2021-02-16 | Stop reason: SDUPTHER

## 2020-09-10 ENCOUNTER — APPOINTMENT (OUTPATIENT)
Dept: INFUSION THERAPY | Age: 45
End: 2020-09-10

## 2020-09-11 RX ORDER — DIPHENHYDRAMINE HYDROCHLORIDE 50 MG/ML
50 INJECTION, SOLUTION INTRAMUSCULAR; INTRAVENOUS
Status: DISCONTINUED | OUTPATIENT
Start: 2020-09-17 | End: 2020-09-18 | Stop reason: HOSPADM

## 2020-09-11 RX ORDER — ACETAMINOPHEN 325 MG/1
650 TABLET ORAL
Status: DISCONTINUED | OUTPATIENT
Start: 2020-09-17 | End: 2020-09-18 | Stop reason: HOSPADM

## 2020-09-11 RX ORDER — DIPHENHYDRAMINE HCL 25 MG
50 CAPSULE ORAL ONCE
Status: COMPLETED | OUTPATIENT
Start: 2020-09-17 | End: 2020-09-17

## 2020-09-11 RX ORDER — ACETAMINOPHEN 325 MG/1
650 TABLET ORAL ONCE
Status: COMPLETED | OUTPATIENT
Start: 2020-09-17 | End: 2020-09-17

## 2020-09-16 ENCOUNTER — HOSPITAL ENCOUNTER (OUTPATIENT)
Dept: INFUSION THERAPY | Age: 45
Discharge: HOME | End: 2020-09-16

## 2020-09-17 ENCOUNTER — HOSPITAL ENCOUNTER (OUTPATIENT)
Dept: INFUSION THERAPY | Age: 45
Discharge: HOME | End: 2020-09-17
Payer: COMMERCIAL

## 2020-09-17 VITALS
WEIGHT: 293 LBS | RESPIRATION RATE: 18 BRPM | OXYGEN SATURATION: 97 % | TEMPERATURE: 98.5 F | DIASTOLIC BLOOD PRESSURE: 78 MMHG | BODY MASS INDEX: 53.49 KG/M2 | SYSTOLIC BLOOD PRESSURE: 114 MMHG | HEART RATE: 68 BPM

## 2020-09-17 PROCEDURE — 96375 TX/PRO/DX INJ NEW DRUG ADDON: CPT

## 2020-09-17 PROCEDURE — 96413 CHEMO IV INFUSION 1 HR: CPT

## 2020-09-17 PROCEDURE — 74011250636 HC RX REV CODE- 250/636: Performed by: PSYCHIATRY & NEUROLOGY

## 2020-09-17 PROCEDURE — 96415 CHEMO IV INFUSION ADDL HR: CPT

## 2020-09-17 PROCEDURE — 74011250637 HC RX REV CODE- 250/637: Performed by: PSYCHIATRY & NEUROLOGY

## 2020-09-17 RX ORDER — SODIUM CHLORIDE 0.9 % (FLUSH) 0.9 %
5-10 SYRINGE (ML) INJECTION AS NEEDED
Status: DISCONTINUED | OUTPATIENT
Start: 2020-09-17 | End: 2020-09-18 | Stop reason: HOSPADM

## 2020-09-17 RX ORDER — SODIUM CHLORIDE 9 MG/ML
25 INJECTION, SOLUTION INTRAVENOUS AS NEEDED
Status: DISCONTINUED | OUTPATIENT
Start: 2020-09-17 | End: 2020-09-18 | Stop reason: HOSPADM

## 2020-09-17 RX ORDER — GABAPENTIN 600 MG/1
600 TABLET ORAL 3 TIMES DAILY
COMMUNITY

## 2020-09-17 RX ADMIN — METHYLPREDNISOLONE SODIUM SUCCINATE 125 MG: 125 INJECTION, POWDER, FOR SOLUTION INTRAMUSCULAR; INTRAVENOUS at 09:31

## 2020-09-17 RX ADMIN — OCRELIZUMAB 600 MG: 300 INJECTION INTRAVENOUS at 09:55

## 2020-09-17 RX ADMIN — Medication 10 ML: at 09:20

## 2020-09-17 RX ADMIN — DIPHENHYDRAMINE HYDROCHLORIDE 50 MG: 25 CAPSULE ORAL at 09:27

## 2020-09-17 RX ADMIN — ACETAMINOPHEN 650 MG: 325 TABLET ORAL at 09:27

## 2020-09-17 RX ADMIN — SODIUM CHLORIDE 25 ML/HR: 900 INJECTION, SOLUTION INTRAVENOUS at 09:29

## 2020-09-17 NOTE — PROGRESS NOTES
910- Pt arrived to [address] [facility]. ambulatory in no acute distress for Ocrevus.  Assessment unremarkable except chronic back pain and generalized fatigue. IV established in left AC without issue and positive blood return noted.     The following medications administered:  NS @ KVO  Tylenol 650 mg PO  Benadryl 50 mg PO  Solu-Medrol 125 mg IVP  Ocrevus 600 mg IV titrated starting at 40 ml/hr    1013 - Patient stated she felt like there was a \"cloth\" over her face and was slightly itchy. Ocrevus paused and NS running. 1030 - Patient back to baseline and feels comfortable restarting Ocrevus. Ocrevus restarted at 40 ml/hr. Patient Vitals for the past 12 hrs:   Temp Pulse Resp BP SpO2   09/17/20 1435 98.5 °F (36.9 °C) 68 18 114/78 97 %   09/17/20 1300 98.4 °F (36.9 °C) 65 18 120/78 97 %   09/17/20 1230 99 °F (37.2 °C) 69 18 122/80 97 %   09/17/20 1200 98.8 °F (37.1 °C) 70 18 130/81 95 %   09/17/20 1130 99 °F (37.2 °C) 79 18 126/80 97 %   09/17/20 1100 99 °F (37.2 °C) 72 18 139/88 98 %   09/17/20 1030 98.5 °F (36.9 °C) 70 18 (!) 143/91 97 %   09/17/20 0915 97.9 °F (36.6 °C) 81 18 (!) 153/90 95 %       1440- Pt tolerated treatment remainder of treatment well. Patient monitored for approximately 15 mins after infusion, declined to stay any longer. IV flushed per policy and removed, 2x2 and coban placed.  Pt discharged ambulatory in no acute distress, accompanied by self. Next appointment 3/25/21.

## 2020-09-25 ENCOUNTER — VIRTUAL VISIT (OUTPATIENT)
Dept: FAMILY MEDICINE CLINIC | Age: 45
End: 2020-09-25
Payer: COMMERCIAL

## 2020-09-25 DIAGNOSIS — E78.00 PURE HYPERCHOLESTEROLEMIA: ICD-10-CM

## 2020-09-25 DIAGNOSIS — Z71.3 WEIGHT LOSS COUNSELING, ENCOUNTER FOR: ICD-10-CM

## 2020-09-25 DIAGNOSIS — G89.29 CHRONIC BILATERAL LOW BACK PAIN WITH SCIATICA, SCIATICA LATERALITY UNSPECIFIED: Primary | ICD-10-CM

## 2020-09-25 DIAGNOSIS — M48.061 FORAMINAL STENOSIS OF LUMBAR REGION: ICD-10-CM

## 2020-09-25 DIAGNOSIS — E66.01 MORBID OBESITY (HCC): ICD-10-CM

## 2020-09-25 DIAGNOSIS — I10 ESSENTIAL HYPERTENSION: ICD-10-CM

## 2020-09-25 DIAGNOSIS — Z79.899 ENCOUNTER FOR LONG-TERM (CURRENT) USE OF MEDICATIONS: ICD-10-CM

## 2020-09-25 DIAGNOSIS — M54.40 CHRONIC BILATERAL LOW BACK PAIN WITH SCIATICA, SCIATICA LATERALITY UNSPECIFIED: Primary | ICD-10-CM

## 2020-09-25 PROCEDURE — 99214 OFFICE O/P EST MOD 30 MIN: CPT | Performed by: FAMILY MEDICINE

## 2020-09-25 RX ORDER — DULOXETIN HYDROCHLORIDE 30 MG/1
30 CAPSULE, DELAYED RELEASE ORAL DAILY
Qty: 30 CAP | Refills: 1 | Status: SHIPPED | OUTPATIENT
Start: 2020-09-25 | End: 2020-11-12 | Stop reason: SDUPTHER

## 2020-09-25 RX ORDER — TOPIRAMATE 50 MG/1
50 TABLET, FILM COATED ORAL 2 TIMES DAILY
Qty: 60 TAB | Refills: 1 | Status: SHIPPED | OUTPATIENT
Start: 2020-09-25 | End: 2020-11-12 | Stop reason: SDUPTHER

## 2020-09-25 RX ORDER — LIDOCAINE 50 MG/G
PATCH TOPICAL
Qty: 30 EACH | Refills: 1 | Status: SHIPPED | OUTPATIENT
Start: 2020-09-25 | End: 2022-07-25 | Stop reason: ALTCHOICE

## 2020-09-25 RX ORDER — IBUPROFEN AND FAMOTIDINE 800; 26.6 MG/1; MG/1
TABLET, COATED ORAL
COMMUNITY
End: 2021-07-09 | Stop reason: ALTCHOICE

## 2020-09-25 NOTE — PROGRESS NOTES
Chief Complaint   Patient presents with    Back Pain     lower back       1. Have you been to the ER, urgent care clinic since your last visit? Hospitalized since your last visit? no    2. Have you seen or consulted any other health care providers outside of the [address] [place] [address] since your last visit? Include any pap smears or colon screening.  yes

## 2020-09-25 NOTE — PROGRESS NOTES
Consent: Mohsen Asher, who was seen by synchronous (real-time) audio-video technology, and/or her healthcare decision maker, is aware that this patient-initiated, Telehealth encounter on 9/25/2020 is a billable service, with coverage as determined by her insurance carrier. She is aware that she may receive a bill and has provided verbal consent to proceed: Yes. Mohsen Asher is a 41 y.o. female    Last saw her for chronic medical conditions >6 months ago 03/2020     has a past medical history of BMI 45.0-49.9, adult ([facility] (2/14/2018), Essential hypertension (4/17/2018), Gastroesophageal reflux disease without esophagitis (3/16/2017), H/O tubal ligation (3/16/2017), Iron deficiency anemia due to chronic blood loss (3/16/2017), Menorrhagia with regular cycle (4/3/2017), Migraine without aura and without status migrainosus, not intractable (3/16/2017), Mild intermittent asthma without complication (3/24/5761), [facility] (multiple sclerosis) ([facility] (10/08), Obesity, Class III, BMI 40-49.9 (morbid obesity) ([facility] (12/3/2015), S/P endometrial ablation (4/16/2018), and Vitamin D deficiency (3/16/2017). Chronic low back pain seeing pain specialist, had steroid injection. S/p nerve ablation on one side. So still have burning on the other side. Gabapentin 300mg written by Orthopedic Dr. Ryan Hyde. She up it herself to BID. Saw Dr. Ryan Hyde recently, 7/20/2020  Rosalina Head to pain specialist had a steroid injection, nerve block just 2 days ago. Gabapentin increased to 600mg 4X daily. She's been in PT for 4 weeks. Chronic low back pain. Start cymbalta   Unabe to do prednisone taper b/c she's already on an immunomodulator for her MS   Start lidocaine patch   She sees a pain specialist, I urge her to call them back, I can't give her any narcotic, it would break her contract.      HTN: BP NOT at goal with home cuffs b/c she's in a lot of pain.    Continue course, HCTZ 25mg and Losartan 100mg.      HLD: tolerating pravastatin     Weight loss counseling. Have gained more weight              Discussed calories count, goal for 1400cal/day. Exercise. Choice of food. topamax 25mg BID w meal didn't work so she just stopped it. We'll increase topamax 50mg bid     depression and anxiety  We've tried a couple of meds, she was noncompliant with them. Doesn't want anything. Report doing fine  Denies SI or HI. She doesn't want refer to Psych.      Neuro at [facility]  Migraine HA  MS managed               Gabapentin filled by her Neurologist     Saw OBGYN: had papsmear there 05/2019  Mammogram done 06/2019      Reviewed: active problem list, medication list, allergies, notes from last encounter, lab results    A comprehensive review of systems was negative except for that written in the HPI. Assessment & Plan:   Diagnoses and all orders for this visit:    1. Chronic bilateral low back pain with sciatica, sciatica laterality unspecified  -     DULoxetine (CYMBALTA) 30 mg capsule; Take 1 Cap by mouth daily. -     lidocaine (LIDODERM) 5 %; Apply patch to the affected area for 12 hours a day and remove for 12 hours a day. 2. Foraminal stenosis of lumbar region  -     DULoxetine (CYMBALTA) 30 mg capsule; Take 1 Cap by mouth daily. -     lidocaine (LIDODERM) 5 %; Apply patch to the affected area for 12 hours a day and remove for 12 hours a day. 3. Essential hypertension  -     METABOLIC PANEL, COMPREHENSIVE  -     EKG, 12 LEAD, INITIAL; Future    4. Morbid obesity (HCC)  -     topiramate (TOPAMAX) 50 mg tablet; Take 1 Tab by mouth two (2) times a day. -     EKG, 12 LEAD, INITIAL; Future    5. Weight loss counseling, encounter for  -     EKG, 12 LEAD, INITIAL; Future    6. Pure hypercholesterolemia  -     METABOLIC PANEL, COMPREHENSIVE    7. Encounter for long-term (current) use of medications  -     METABOLIC PANEL, COMPREHENSIVE  -     EKG, 12 LEAD, INITIAL;  Future        Follow-up and Dispositions    · Return in about 4 weeks (around 10/23/2020) for weight loss. I spent at least 25 minutes with this established patient, and >50% of the time was spent counseling and/or coordinating care regarding multiple medical issues above  712  Subjective:   Marco Pérez is a 41 y.o. female who was seen for Back Pain (lower back)      Prior to Admission medications    Medication Sig Start Date End Date Taking? Authorizing Provider   tiZANidine & Irritant Cntr 2 4 mg kit tizanidine 4 mg tablet   Yes Provider, Historical   ibuprofen-famotidine (Duexis) 800-26.6 mg tab Duexis 800 mg-26.6 mg tablet   Take 1 tablet 3 times a day by oral route as needed for 30 days. Yes Provider, Historical   DULoxetine (CYMBALTA) 30 mg capsule Take 1 Cap by mouth daily. 9/25/20  Yes Elsy Reed MD   lidocaine (LIDODERM) 5 % Apply patch to the affected area for 12 hours a day and remove for 12 hours a day. 9/25/20  Yes Elsy Reed MD   topiramate (TOPAMAX) 50 mg tablet Take 1 Tab by mouth two (2) times a day. 9/25/20  Yes Elsy Reed MD   gabapentin (NEURONTIN) 600 mg tablet Take 600 mg by mouth four (4) times daily. Yes Provider, Historical   pravastatin (PRAVACHOL) 10 mg tablet TAKE 1 TABLET BY MOUTH EVERY NIGHT 8/20/20  Yes Jh Babin MD   omeprazole (PRILOSEC) 40 mg capsule TAKE 1 CAPSULE BY MOUTH DAILY 8/20/20  Yes Elsy Reed MD   cetirizine (ZYRTEC) 10 mg tablet TAKE 1 TABLET BY MOUTH EVERY DAY 8/20/20  Yes Elsy Reed MD   hydroCHLOROthiazide (HYDRODIURIL) 25 mg tablet TAKE 1 TABLET BY MOUTH DAILY 7/29/20  Yes Elsy Reed MD   losartan (COZAAR) 100 mg tablet TAKE 1 TABLET BY MOUTH DAILY 7/29/20  Yes Elsy Reed MD   ergocalciferol (ERGOCALCIFEROL) 1,250 mcg (50,000 unit) capsule TAKE 1 CAPSULE BY MOUTH EVERY 7 DAYS 7/22/20  Yes Elsy Reed MD   montelukast (SINGULAIR) 10 mg tablet Take 1 Tab by mouth daily.  7/7/20  Yes Elsy Reed MD   ibuprofen (MOTRIN) 800 mg tablet TK 1 T PO TID 11/22/19  Yes Provider, Historical   ocrelizumab (OCREVUS) 30 mg/mL soln injection by IntraVENous route. Yes Provider, Historical   albuterol (Proventil HFA) 90 mcg/actuation inhaler Take 2 Puffs by inhalation every four (4) hours as needed for Wheezing (and cough). 6/11/20   Anamaria Barron MD   albuterol (PROVENTIL VENTOLIN) 2.5 mg /3 mL (0.083 %) nebu 3 mL by Nebulization route every four (4) hours as needed for Wheezing. 5/22/20   Anamaria Barron MD   fluticasone propion-salmeterol (ADVAIR DISKUS) 250-50 mcg/dose diskus inhaler Take 1 Puff by inhalation every twelve (12) hours. 11/14/19   Anamaria Barron MD   fluticasone propionate (FLONASE) 50 mcg/actuation nasal spray 2 Sprays by Both Nostrils route daily. 11/14/19   Anamaria Barron MD   butalbital-acetaminophen-caffeine (FIORICET, ESGIC) -40 mg per tablet Take 1 Tab by mouth every six (6) hours as needed for Pain for up to 30 doses.  Max 6 /24 hours 6/23/18   Roma Aguilar MD     Allergies   Allergen Reactions   Shantanu Tinajero [Natalizumab] Anaphylaxis       Patient Active Problem List    Diagnosis Date Noted    Depression, major, recurrent, mild ([facility] 11/13/2019    Encounter for long-term (current) use of medications 01/04/2019    Acute relapsing multiple sclerosis ([facility] 06/21/2018    Essential hypertension 04/17/2018    S/P endometrial ablation 04/16/2018    BMI 45.0-49.9, [facility] 02/14/2018    Menorrhagia with regular cycle 04/03/2017    Mild intermittent asthma without complication 70/28/3550    Vitamin D deficiency 03/16/2017    Iron deficiency anemia due to chronic blood loss 03/16/2017    Migraine without aura and without status migrainosus, not intractable 03/16/2017    H/O tubal ligation 03/16/2017    Gastroesophageal reflux disease without esophagitis 03/16/2017    Obesity, Class III, BMI 40-49.9 (morbid obesity) ([facility] 12/03/2015    Heavy menstrual period 03/27/2013    Anemia 03/27/2013    Multiple sclerosis ([facility] 08/10/2011     Past Medical History:   Diagnosis Date    BMI 45.0-49.9, adult ([facility] 2018    Essential hypertension 2018    Gastroesophageal reflux disease without esophagitis 3/16/2017    H/O tubal ligation 3/16/2017    Iron deficiency anemia due to chronic blood loss 3/16/2017    Menorrhagia with regular cycle 4/3/2017    Migraine without aura and without status migrainosus, not intractable 3/16/2017    Mild intermittent asthma without complication     MS (multiple sclerosis) ([facility] 10/08    using clinical trial medications    Obesity, Class III, BMI 40-49.9 (morbid obesity) ([facility] 12/3/2015    S/P endometrial ablation 2018    Vitamin D deficiency 3/16/2017     Past Surgical History:   Procedure Laterality Date    HX  SECTION      x3; , , &     LAP,TUBAL CAUTERY           Objective:   Vital Signs: (As obtained by patient/caregiver at home)  There were no vitals taken for this visit.      Constitutional: [x] Appears well-developed and well-nourished [x] No apparent distress        Mental status: [x] Alert and awake  [x] Oriented to person/place/time [x] Able to follow commands      Eyes:   EOM    [x]  Normal      Sclera  [x]  Normal              Discharge [x]  None visible       HENT: [x] Normocephalic, atraumatic    [] Mouth/Throat: Mucous membranes are moist    External Ears [x] Normal      Neck: [x] No visualized mass     Pulmonary/Chest: [x] Respiratory effort normal   [x] No visualized signs of difficulty breathing or respiratory distress           Musculoskeletal:   [x] Normal gait with no signs of ataxia         [x] Normal range of motion of neck         Neurological:        [x] No Facial Asymmetry (Cranial nerve 7 motor function) (limited exam due to video visit)          [x] No gaze palsy              Skin:        [x] No significant exanthematous lesions or discoloration noted on facial skin                  Psychiatric:       [x] Normal Affect [] Abnormal -        [x] No Hallucinations      We discussed the expected course, resolution and complications of the diagnosis(es) in detail. Medication risks, benefits, costs, interactions, and alternatives were discussed as indicated. I advised her to contact the office if her condition worsens, changes or fails to improve as anticipated. She expressed understanding with the diagnosis(es) and plan. Fabio Hanson is a 41 y.o. female being evaluated by a video visit encounter for concerns as above. A caregiver was present when appropriate. Due to this being a TeleHealth encounter (During [facility]-23 public health emergency), evaluation of the following organ systems was limited: Vitals/Constitutional/EENT/Resp/CV/GI//MS/Neuro/Skin/Heme-Lymph-Imm. Pursuant to the emergency declaration under the [facility] waiver authority and the [email or web address] Act, this Virtual  Visit was conducted, with patient's (and/or legal guardian's) consent, to reduce the patient's risk of exposure to COVID-19 and provide necessary medical care. Services were provided through a video synchronous discussion virtually to substitute for in-person clinic visit. Patient and provider were located at their individual homes.         Scarlett Yeh MD

## 2020-10-09 LAB
ALBUMIN SERPL-MCNC: 4.6 G/DL (ref 3.8–4.8)
ALBUMIN/GLOB SERPL: 1.8 {RATIO} (ref 1.2–2.2)
ALP SERPL-CCNC: 60 IU/L (ref 39–117)
ALT SERPL-CCNC: 23 IU/L (ref 0–32)
AST SERPL-CCNC: 18 IU/L (ref 0–40)
BILIRUB SERPL-MCNC: 0.4 MG/DL (ref 0–1.2)
BUN SERPL-MCNC: 11 MG/DL (ref 6–24)
BUN/CREAT SERPL: 10 (ref 9–23)
CALCIUM SERPL-MCNC: 10.1 MG/DL (ref 8.7–10.2)
CHLORIDE SERPL-SCNC: 100 MMOL/L (ref 96–106)
CO2 SERPL-SCNC: 28 MMOL/L (ref 20–29)
CREAT SERPL-MCNC: 1.11 MG/DL (ref 0.57–1)
GLOBULIN SER CALC-MCNC: 2.5 G/DL (ref 1.5–4.5)
GLUCOSE SERPL-MCNC: 88 MG/DL (ref 65–99)
POTASSIUM SERPL-SCNC: 3.8 MMOL/L (ref 3.5–5.2)
PROT SERPL-MCNC: 7.1 G/DL (ref 6–8.5)
SODIUM SERPL-SCNC: 143 MMOL/L (ref 134–144)

## 2020-10-13 ENCOUNTER — TRANSCRIBE ORDER (OUTPATIENT)
Dept: SCHEDULING | Age: 45
End: 2020-10-13

## 2020-10-13 DIAGNOSIS — Z12.31 VISIT FOR SCREENING MAMMOGRAM: Primary | ICD-10-CM

## 2020-11-12 ENCOUNTER — OFFICE VISIT (OUTPATIENT)
Dept: FAMILY MEDICINE CLINIC | Age: 45
End: 2020-11-12
Payer: COMMERCIAL

## 2020-11-12 VITALS
SYSTOLIC BLOOD PRESSURE: 138 MMHG | RESPIRATION RATE: 20 BRPM | TEMPERATURE: 98.2 F | WEIGHT: 293 LBS | OXYGEN SATURATION: 98 % | HEART RATE: 95 BPM | HEIGHT: 66 IN | BODY MASS INDEX: 47.09 KG/M2 | DIASTOLIC BLOOD PRESSURE: 100 MMHG

## 2020-11-12 DIAGNOSIS — M48.061 FORAMINAL STENOSIS OF LUMBAR REGION: ICD-10-CM

## 2020-11-12 DIAGNOSIS — Z23 NEEDS FLU SHOT: ICD-10-CM

## 2020-11-12 DIAGNOSIS — M54.40 CHRONIC BILATERAL LOW BACK PAIN WITH SCIATICA, SCIATICA LATERALITY UNSPECIFIED: ICD-10-CM

## 2020-11-12 DIAGNOSIS — E66.01 MORBID OBESITY (HCC): ICD-10-CM

## 2020-11-12 DIAGNOSIS — G89.29 CHRONIC BILATERAL LOW BACK PAIN WITH SCIATICA, SCIATICA LATERALITY UNSPECIFIED: ICD-10-CM

## 2020-11-12 DIAGNOSIS — I10 ESSENTIAL HYPERTENSION: ICD-10-CM

## 2020-11-12 DIAGNOSIS — F41.8 DEPRESSION WITH ANXIETY: Primary | ICD-10-CM

## 2020-11-12 PROCEDURE — 99214 OFFICE O/P EST MOD 30 MIN: CPT | Performed by: FAMILY MEDICINE

## 2020-11-12 PROCEDURE — 90686 IIV4 VACC NO PRSV 0.5 ML IM: CPT | Performed by: FAMILY MEDICINE

## 2020-11-12 PROCEDURE — 90471 IMMUNIZATION ADMIN: CPT | Performed by: FAMILY MEDICINE

## 2020-11-12 RX ORDER — IBUPROFEN AND FAMOTIDINE 800; 26.6 MG/1; MG/1
TABLET, COATED ORAL
COMMUNITY
End: 2020-11-12 | Stop reason: SDUPTHER

## 2020-11-12 RX ORDER — DULOXETIN HYDROCHLORIDE 30 MG/1
30 CAPSULE, DELAYED RELEASE ORAL DAILY
Qty: 30 CAP | Refills: 1 | Status: CANCELLED | OUTPATIENT
Start: 2020-11-12

## 2020-11-12 RX ORDER — DULOXETIN HYDROCHLORIDE 30 MG/1
30 CAPSULE, DELAYED RELEASE ORAL DAILY
Qty: 30 CAP | Refills: 2 | Status: SHIPPED | OUTPATIENT
Start: 2020-11-12 | End: 2020-11-17

## 2020-11-12 RX ORDER — TIZANIDINE 2 MG/1
TABLET ORAL
COMMUNITY
Start: 2020-11-05 | End: 2021-02-23 | Stop reason: ALTCHOICE

## 2020-11-12 RX ORDER — TOPIRAMATE 50 MG/1
50 TABLET, FILM COATED ORAL 2 TIMES DAILY
Qty: 60 TAB | Refills: 2 | Status: SHIPPED | OUTPATIENT
Start: 2020-11-12 | End: 2020-11-17

## 2020-11-12 NOTE — PROGRESS NOTES
Chiki Villanueva is a 41 y.o. 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 [employer]       has a past medical history of BMI 45.0-49.9, adult ([facility] (2/14/2018), Essential hypertension (4/17/2018), Gastroesophageal reflux disease without esophagitis (3/16/2017), H/O tubal ligation (3/16/2017), Iron deficiency anemia due to chronic blood loss (3/16/2017), Menorrhagia with regular cycle (4/3/2017), Migraine without aura and without status migrainosus, not intractable (3/16/2017), Mild intermittent asthma without complication (6/67/3362), MS (multiple sclerosis) ([facility] (10/08), Obesity, Class III, BMI 40-49.9 (morbid obesity) ([facility] (12/3/2015), S/P endometrial ablation (4/16/2018), and Vitamin D deficiency (3/16/2017). HTN: BP NOT at goal today but was WNL last records. Monitor outside.     Continue course, HCTZ 25mg and Losartan 100mg.      HLD: tolerating pravastatin     Weight loss counseling. She lost 6 lbs.             Discussed calories count, goal for 1400cal/day.  Exercise. Choice of food.               last visit we increased topamax 50mg bid - does help, appetite lessen     We ordered EKG in anticipation of phentermine - she didn't do them yet. She's happy with topamax for now. depression and anxiety  We've tried a couple of meds, she was noncompliant with them. Doesn't want anything. Report doing fine  Denies SI or HI. Cymbalta helps with pain. Also helped with her mood. She feels happy .     Since our last visit   She saw neurologist - not MS  Saw pain clinic had spine steroid injection. Chronic low back pain seeing pain specialist, had steroid injection. S/p nerve ablation on one side.  So still have burning on the other side. Gabapentin 300mg written by Orthopedic Dr. Brennan hSaw. She up it herself to BID. Saw Dr. Brennan Shaw recently, 7/20/2020  Prosper Rival to pain specialist had a steroid injection, nerve block just 2 days ago. Gabapentin increased to 600mg 4X daily.               She's been in PT for 4 weeks. [facility]  Migraine HA  MS managed               Gabapentin filled by her Neurologist     Saw [facility]: had papsmear there 05/2019  Mammogram done 06/2019      Reviewed: active problem list, medication list, allergies, notes from last encounter, lab results    A comprehensive review of systems was negative except for that written in the HPI. Allergies   Allergen Reactions    Tysabri [Natalizumab] Anaphylaxis     Current Outpatient Medications on File Prior to Visit   Medication Sig Dispense Refill    tiZANidine (ZANAFLEX) 2 mg tablet       tiZANidine & Irritant Cntr 2 4 mg kit tizanidine 4 mg tablet      ibuprofen-famotidine (Duexis) 800-26.6 mg tab Duexis 800 mg-26.6 mg tablet   Take 1 tablet 3 times a day by oral route as needed for 30 days.  lidocaine (LIDODERM) 5 % Apply patch to the affected area for 12 hours a day and remove for 12 hours a day. 30 Each 1    gabapentin (NEURONTIN) 600 mg tablet Take 600 mg by mouth four (4) times daily.  pravastatin (PRAVACHOL) 10 mg tablet TAKE 1 TABLET BY MOUTH EVERY NIGHT 90 Tab 1    omeprazole (PRILOSEC) 40 mg capsule TAKE 1 CAPSULE BY MOUTH DAILY 90 Cap 0    cetirizine (ZYRTEC) 10 mg tablet TAKE 1 TABLET BY MOUTH EVERY DAY 90 Tab 0    hydroCHLOROthiazide (HYDRODIURIL) 25 mg tablet TAKE 1 TABLET BY MOUTH DAILY 90 Tab 1    losartan (COZAAR) 100 mg tablet TAKE 1 TABLET BY MOUTH DAILY 90 Tab 1    ergocalciferol (ERGOCALCIFEROL) 1,250 mcg (50,000 unit) capsule TAKE 1 CAPSULE BY MOUTH EVERY 7 DAYS 12 Cap 1    albuterol (Proventil HFA) 90 mcg/actuation inhaler Take 2 Puffs by inhalation every four (4) hours as needed for Wheezing (and cough). 1 Inhaler 2    albuterol (PROVENTIL VENTOLIN) 2.5 mg /3 mL (0.083 %) nebu 3 mL by Nebulization route every four (4) hours as needed for Wheezing.  200 Each 0    ibuprofen (MOTRIN) 800 mg tablet TK 1 T PO TID      fluticasone propion-salmeterol (ADVAIR DISKUS) 250-50 mcg/dose diskus inhaler Take 1 Puff by inhalation every twelve (12) hours. 3 Each 0    fluticasone propionate (FLONASE) 50 mcg/actuation nasal spray 2 Sprays by Both Nostrils route daily. 3 Bottle 0    ocrelizumab (OCREVUS) 30 mg/mL soln injection by IntraVENous route.  butalbital-acetaminophen-caffeine (FIORICET, ESGIC) -40 mg per tablet Take 1 Tab by mouth every six (6) hours as needed for Pain for up to 30 doses. Max 6 /24 hours 30 Tab 0    montelukast (SINGULAIR) 10 mg tablet Take 1 Tab by mouth daily. 30 Tab 2     No current facility-administered medications on file prior to visit. Patient Active Problem List   Diagnosis Code    Multiple sclerosis ([facility] G35    Heavy menstrual period N92.0    Anemia D64.9    Obesity, Class III, BMI 40-49.9 (morbid obesity) ([facility]) E66.01    Mild intermittent asthma without complication G58.91    Vitamin D deficiency E55.9    Iron deficiency anemia due to chronic blood loss D50.0    Migraine without aura and without status migrainosus, not intractable G43.009    H/O tubal ligation Z98.51    Gastroesophageal reflux disease without esophagitis K21.9    Menorrhagia with regular cycle N92.0    BMI 45.0-49.9, adult ([facility]) Z68.42    S/P endometrial ablation Z98.890    Essential hypertension I10    Acute relapsing multiple sclerosis ([facility] G35    Encounter for long-term (current) use of medications Z79.899    Depression, major, recurrent, mild ([facility]) F33.0       Visit Vitals  BP (!) 138/100   Pulse 95   Temp 98.2 °F (36.8 °C) (Temporal)   Resp 20   Ht 5' 6\" (1.676 m)   Wt 325 lb (147.4 kg)   SpO2 98%   BMI 52.46 kg/m²     General appearance: alert, cooperative, no distress, appears stated age  Neurologic: Alert and oriented X 3, normal strength and tone, symmetric. Normal without focal findings. Cranial nerves 2-12 intact. Normal coordination and gait. Mental status: Alert, oriented, thought content appropriate, affect: stable, mood-congruent.     Head: Normocephalic, without obvious abnormality, atraumatic  Eyes: conjunctivae/corneas clear. PERRL, EOM's intact. Neck: supple, symmetrical, trachea midline, no JVD  Lungs: clear to auscultation bilaterally  Heart: regular rate and rhythm, S1, S2 normal, no murmur, click, rub or gallop  Abdomen: soft, non-tender. Extremities: extremities normal, atraumatic, no cyanosis or edema      Assessment/Plans:    Diagnoses and all orders for this visit:    1. Depression with anxiety    2. Essential hypertension    3. Foraminal stenosis of lumbar region    4. Chronic bilateral low back pain with sciatica, sciatica laterality unspecified    5. Morbid obesity ([address]    6. Needs flu shot  -     INFLUENZA VIRUS VAC QUAD,SPLIT,PRESV FREE SYRINGE IM      Discussed plans, risk/benefits of treatments/observations. Through the use of shared decision making, above plans were agreed upon. Medication compliance advised. Patient verbalized understanding. Follow-up and Dispositions    · Return in about 3 months (around 2/12/2021) for chronic medical conditions.          Lani Escoto MD  11/23/2020

## 2020-11-12 NOTE — PATIENT INSTRUCTIONS
Vaccine Information Statement Influenza (Flu) Vaccine (Inactivated or Recombinant): What You Need to Know Many Vaccine Information Statements are available in [place] and other languages. See www.immunize.org/vis Hojas de información sobre vacunas están disponibles en español y en muchos otros idiomas. Visite www.immunize.org/vis 1. Why get vaccinated? Influenza vaccine can prevent influenza (flu). Flu is a contagious disease that spreads around the [facility] every year, usually between October and May. Anyone can get the flu, but it is more dangerous for some people. Infants and young children, people 72years of age and older, pregnant women, and people with certain health conditions or a weakened immune system are at greatest risk of flu complications. Pneumonia, bronchitis, sinus infections and ear infections are examples of flu-related complications. If you have a medical condition, such as heart disease, cancer or diabetes, flu can make it worse. Flu can cause fever and chills, sore throat, muscle aches, fatigue, cough, headache, and runny or stuffy nose. Some people may have vomiting and diarrhea, though this is more common in children than adults. Each year thousands of people in the [facility] die from flu, and many more are hospitalized. Flu vaccine prevents millions of illnesses and flu-related visits to the doctor each year. 2. Influenza vaccines CDC recommends everyone 10months of age and older get vaccinated every flu season. Children 6 months through 6years of age may need 2 doses during a single flu season. Everyone else needs only 1 dose each flu season. It takes about 2 weeks for protection to develop after vaccination. There are many flu viruses, and they are always changing. Each year a new flu vaccine is made to protect against three or four viruses that are likely to cause disease in the upcoming flu season.  Even when the vaccine doesnt exactly match these viruses, it may still provide some protection. Influenza vaccine does not cause flu. Influenza vaccine may be given at the same time as other vaccines. 3. Talk with your health care provider Tell your vaccine provider if the person getting the vaccine: 
 Has had an allergic reaction after a previous dose of influenza vaccine, or has any severe, life-threatening allergies.  Has ever had Guillain-Barré Syndrome (also called GBS). In some cases, your health care provider may decide to postpone influenza vaccination to a future visit. People with minor illnesses, such as a cold, may be vaccinated. People who are moderately or severely ill should usually wait until they recover before getting influenza vaccine. Your health care provider can give you more information. 4. Risks of a reaction  Soreness, redness, and swelling where shot is given, fever, muscle aches, and headache can happen after influenza vaccine.  There may be a very small increased risk of Guillain-Barré Syndrome (GBS) after inactivated influenza vaccine (the flu shot). Goldie Momin children who get the flu shot along with pneumococcal vaccine (PCV13), and/or DTaP vaccine at the same time might be slightly more likely to have a seizure caused by fever. Tell your health care provider if a child who is getting flu vaccine has ever had a seizure. People sometimes faint after medical procedures, including vaccination. Tell your provider if you feel dizzy or have vision changes or ringing in the ears. As with any medicine, there is a very remote chance of a vaccine causing a severe allergic reaction, other serious injury, or death. 5. What if there is a serious problem? An allergic reaction could occur after the vaccinated person leaves the clinic.  If you see signs of a severe allergic reaction (hives, swelling of the face and throat, difficulty breathing, a fast heartbeat, dizziness, or weakness), call 9-1-1 and get the person to the nearest hospital. 
 
For other signs that concern you, call your health care provider. Adverse reactions should be reported to the Vaccine Adverse Event Reporting System (VAERS). Your health care provider will usually file this report, or you can do it yourself. Visit the VAERS website at www.vaers. hhs.gov or call 4-915.226.9998. VAERS is only for reporting reactions, and VAERS staff do not give medical advice. 6. The National Vaccine Injury Compensation Program 
 
The [facility] Vaccine Injury Compensation Program (VICP) is a federal program that was created to compensate people who may have been injured by certain vaccines. Visit the VICP website at www.hrsa.gov/vaccinecompensation or call 6-187.925.1221 to learn about the program and about filing a claim. There is a time limit to file a claim for compensation. 7. How can I learn more?  Ask your health care provider.  Call your local or state health department.  Contact the Centers for Disease Control and Prevention (CDC): 
- Call 9-526.395.6973 (1-800-CDC-INFO) or 
- Visit CDCs influenza website at www.cdc.gov/flu Vaccine Information Statement (Interim) Inactivated Influenza Vaccine 8/15/2019 
[id number] Department of Health and [employer] Centers for Disease Control and Prevention Office Use Only

## 2020-11-12 NOTE — PROGRESS NOTES
Chief Complaint   Patient presents with    Weight Loss     f/u         1. Have you been to the ER, urgent care clinic since your last visit? Hospitalized since your last visit? No    2. Have you seen or consulted any other health care providers outside of the [address] [place] [address] since your last visit? Include any pap smears or colon screening.  No

## 2020-11-14 ENCOUNTER — HOSPITAL ENCOUNTER (OUTPATIENT)
Dept: MAMMOGRAPHY | Age: 45
Discharge: HOME | End: 2020-11-14
Attending: FAMILY MEDICINE
Payer: COMMERCIAL

## 2020-11-14 DIAGNOSIS — Z12.31 VISIT FOR SCREENING MAMMOGRAM: ICD-10-CM

## 2020-11-14 PROCEDURE — 77067 SCR MAMMO BI INCL CAD: CPT

## 2020-11-17 DIAGNOSIS — M48.061 FORAMINAL STENOSIS OF LUMBAR REGION: ICD-10-CM

## 2020-11-17 DIAGNOSIS — E66.01 MORBID OBESITY (HCC): ICD-10-CM

## 2020-11-17 DIAGNOSIS — G89.29 CHRONIC BILATERAL LOW BACK PAIN WITH SCIATICA, SCIATICA LATERALITY UNSPECIFIED: ICD-10-CM

## 2020-11-17 DIAGNOSIS — M54.40 CHRONIC BILATERAL LOW BACK PAIN WITH SCIATICA, SCIATICA LATERALITY UNSPECIFIED: ICD-10-CM

## 2020-11-17 RX ORDER — DULOXETIN HYDROCHLORIDE 30 MG/1
CAPSULE, DELAYED RELEASE ORAL
Qty: 30 CAP | Refills: 2 | Status: SHIPPED | OUTPATIENT
Start: 2020-11-17 | End: 2021-02-10 | Stop reason: SDUPTHER

## 2020-11-17 RX ORDER — TOPIRAMATE 50 MG/1
TABLET, FILM COATED ORAL
Qty: 60 TAB | Refills: 2 | Status: SHIPPED | OUTPATIENT
Start: 2020-11-17 | End: 2021-03-15 | Stop reason: SDUPTHER

## 2020-12-10 RX ORDER — FLUTICASONE PROPIONATE AND SALMETEROL 50; 250 UG/1; UG/1
POWDER RESPIRATORY (INHALATION)
Qty: 60 EACH | Refills: 2 | Status: SHIPPED | OUTPATIENT
Start: 2020-12-10 | End: 2021-07-09 | Stop reason: SDUPTHER

## 2020-12-28 DIAGNOSIS — E55.9 VITAMIN D DEFICIENCY: ICD-10-CM

## 2020-12-30 RX ORDER — ERGOCALCIFEROL 1.25 MG/1
CAPSULE ORAL
Qty: 12 CAP | Refills: 1 | Status: SHIPPED | OUTPATIENT
Start: 2020-12-30 | End: 2021-06-11 | Stop reason: SDUPTHER

## 2021-01-18 DIAGNOSIS — I10 ESSENTIAL HYPERTENSION: ICD-10-CM

## 2021-01-18 DIAGNOSIS — K21.9 GASTROESOPHAGEAL REFLUX DISEASE WITHOUT ESOPHAGITIS: ICD-10-CM

## 2021-01-19 DIAGNOSIS — I10 ESSENTIAL HYPERTENSION: ICD-10-CM

## 2021-01-19 RX ORDER — OMEPRAZOLE 40 MG/1
CAPSULE, DELAYED RELEASE ORAL
Qty: 90 CAP | Refills: 0 | Status: SHIPPED | OUTPATIENT
Start: 2021-01-19 | End: 2021-04-19 | Stop reason: SDUPTHER

## 2021-01-19 RX ORDER — LOSARTAN POTASSIUM 100 MG/1
TABLET ORAL
Qty: 90 TAB | Refills: 1 | Status: SHIPPED | OUTPATIENT
Start: 2021-01-19 | End: 2021-07-09 | Stop reason: SDUPTHER

## 2021-01-20 RX ORDER — HYDROCHLOROTHIAZIDE 25 MG/1
TABLET ORAL
Qty: 90 TAB | Refills: 1 | Status: SHIPPED | OUTPATIENT
Start: 2021-01-20 | End: 2021-07-09 | Stop reason: SDUPTHER

## 2021-02-04 ENCOUNTER — OFFICE VISIT (OUTPATIENT)
Dept: FAMILY MEDICINE CLINIC | Age: 46
End: 2021-02-04
Payer: COMMERCIAL

## 2021-02-04 VITALS
RESPIRATION RATE: 20 BRPM | HEART RATE: 89 BPM | DIASTOLIC BLOOD PRESSURE: 91 MMHG | BODY MASS INDEX: 47.09 KG/M2 | SYSTOLIC BLOOD PRESSURE: 143 MMHG | TEMPERATURE: 95.7 F | WEIGHT: 293 LBS | HEIGHT: 66 IN | OXYGEN SATURATION: 94 %

## 2021-02-04 DIAGNOSIS — Z71.3 WEIGHT LOSS COUNSELING, ENCOUNTER FOR: ICD-10-CM

## 2021-02-04 DIAGNOSIS — E78.00 PURE HYPERCHOLESTEROLEMIA: ICD-10-CM

## 2021-02-04 DIAGNOSIS — Z00.00 ANNUAL PHYSICAL EXAM: Primary | ICD-10-CM

## 2021-02-04 DIAGNOSIS — N30.01 ACUTE CYSTITIS WITH HEMATURIA: ICD-10-CM

## 2021-02-04 DIAGNOSIS — Z13.1 SCREENING FOR DIABETES MELLITUS: ICD-10-CM

## 2021-02-04 DIAGNOSIS — Z79.899 ENCOUNTER FOR LONG-TERM (CURRENT) USE OF MEDICATIONS: ICD-10-CM

## 2021-02-04 DIAGNOSIS — E55.9 VITAMIN D DEFICIENCY: ICD-10-CM

## 2021-02-04 LAB
BILIRUB UR QL STRIP: NEGATIVE
GLUCOSE UR-MCNC: NEGATIVE MG/DL
KETONES P FAST UR STRIP-MCNC: NEGATIVE MG/DL
PH UR STRIP: 6 [PH] (ref 4.6–8)
PROT UR QL STRIP: NEGATIVE
SP GR UR STRIP: 1.01 (ref 1–1.03)
UA UROBILINOGEN AMB POC: NORMAL (ref 0.2–1)
URINALYSIS CLARITY POC: NORMAL
URINALYSIS COLOR POC: YELLOW
URINE BLOOD POC: NORMAL
URINE LEUKOCYTES POC: NORMAL
URINE NITRITES POC: NEGATIVE

## 2021-02-04 PROCEDURE — 99396 PREV VISIT EST AGE 40-64: CPT | Performed by: FAMILY MEDICINE

## 2021-02-04 PROCEDURE — 81003 URINALYSIS AUTO W/O SCOPE: CPT | Performed by: FAMILY MEDICINE

## 2021-02-04 PROCEDURE — 99212 OFFICE O/P EST SF 10 MIN: CPT | Performed by: FAMILY MEDICINE

## 2021-02-04 RX ORDER — NITROFURANTOIN 25; 75 MG/1; MG/1
100 CAPSULE ORAL 2 TIMES DAILY
Qty: 10 CAP | Refills: 0 | Status: SHIPPED | OUTPATIENT
Start: 2021-02-04 | End: 2021-02-09

## 2021-02-04 RX ORDER — ERGOCALCIFEROL 1.25 MG/1
CAPSULE ORAL
Qty: 12 CAP | Refills: 1 | Status: CANCELLED | OUTPATIENT
Start: 2021-02-04

## 2021-02-04 NOTE — PROGRESS NOTES
Daria Landrum is a 41 y.o. female    S/p uterine ablation. Annual exam     has a past medical history of BMI 45.0-49.9, adult ([facility] [address] (2/14/2018), Essential hypertension (4/17/2018), Gastroesophageal reflux disease without esophagitis (3/16/2017), H/O tubal ligation (3/16/2017), Iron deficiency anemia due to chronic blood loss (3/16/2017), Menorrhagia with regular cycle (4/3/2017), Migraine without aura and without status migrainosus, not intractable (3/16/2017), Mild intermittent asthma without complication (2/52/4317), MS (multiple sclerosis) ([facility] (10/08), Obesity, Class III, BMI 40-49.9 (morbid obesity) ([facility] (12/3/2015), S/P endometrial ablation (4/16/2018), and Vitamin D deficiency (3/16/2017). Screening diabetes    HLD: labs monitoring    Dysuria X 1 week, urgency, frequency, odor. No new flank pain. Denies N/V, fever, chills. Denies STI. Macrobid BID X 5 day    Weight loss counseling:   She's gained more weight. She's been on topamax for 6 months. Does help with appetite, eats only once but still gaining weight. Refer to gastric bypass. Reviewed: active problem list, medication list, allergies, notes from last encounter, lab results    A comprehensive review of systems was negative except for that written in the HPI.       Allergies   Allergen Reactions    Tysabri [Natalizumab] Anaphylaxis     Current Outpatient Medications on File Prior to Visit   Medication Sig Dispense Refill    hydroCHLOROthiazide (HYDRODIURIL) 25 mg tablet TAKE 1 TABLET BY MOUTH DAILY 90 Tab 1    losartan (COZAAR) 100 mg tablet TAKE 1 TABLET BY MOUTH DAILY 90 Tab 1    omeprazole (PRILOSEC) 40 mg capsule TAKE 1 CAPSULE BY MOUTH DAILY 90 Cap 0    ergocalciferol (ERGOCALCIFEROL) 1,250 mcg (50,000 unit) capsule TAKE 1 CAPSULE BY MOUTH EVERY 7 DAYS 12 Cap 1    Advair Diskus 250-50 mcg/dose diskus inhaler INHALE 1 PUFF BY MOUTH EVERY 12 HOURS 60 Each 2    topiramate (TOPAMAX) 50 mg tablet TAKE 1 TABLET BY MOUTH TWICE DAILY 60 Tab 2    DULoxetine (CYMBALTA) 30 mg capsule TAKE 1 CAPSULE BY MOUTH DAILY 30 Cap 2    tiZANidine (ZANAFLEX) 2 mg tablet       tiZANidine & Irritant Cntr 2 4 mg kit tizanidine 4 mg tablet      ibuprofen-famotidine (Duexis) 800-26.6 mg tab Duexis 800 mg-26.6 mg tablet   Take 1 tablet 3 times a day by oral route as needed for 30 days.  lidocaine (LIDODERM) 5 % Apply patch to the affected area for 12 hours a day and remove for 12 hours a day. 30 Each 1    gabapentin (NEURONTIN) 600 mg tablet Take 600 mg by mouth four (4) times daily.  pravastatin (PRAVACHOL) 10 mg tablet TAKE 1 TABLET BY MOUTH EVERY NIGHT 90 Tab 1    cetirizine (ZYRTEC) 10 mg tablet TAKE 1 TABLET BY MOUTH EVERY DAY 90 Tab 0    montelukast (SINGULAIR) 10 mg tablet Take 1 Tab by mouth daily. 30 Tab 2    albuterol (Proventil HFA) 90 mcg/actuation inhaler Take 2 Puffs by inhalation every four (4) hours as needed for Wheezing (and cough). 1 Inhaler 2    albuterol (PROVENTIL VENTOLIN) 2.5 mg /3 mL (0.083 %) nebu 3 mL by Nebulization route every four (4) hours as needed for Wheezing. 200 Each 0    ibuprofen (MOTRIN) 800 mg tablet TK 1 T PO TID      fluticasone propionate (FLONASE) 50 mcg/actuation nasal spray 2 Sprays by Both Nostrils route daily. 3 Bottle 0    ocrelizumab (OCREVUS) 30 mg/mL soln injection by IntraVENous route.  butalbital-acetaminophen-caffeine (FIORICET, ESGIC) -40 mg per tablet Take 1 Tab by mouth every six (6) hours as needed for Pain for up to 30 doses. Max 6 /24 hours 30 Tab 0     No current facility-administered medications on file prior to visit.       Patient Active Problem List   Diagnosis Code    Multiple sclerosis ([facility] G35    Heavy menstrual period N92.0    Anemia D64.9    Obesity, Class III, BMI 40-49.9 (morbid obesity) (HCC) E66.01    Mild intermittent asthma without complication C16.31    Vitamin D deficiency E55.9    Iron deficiency anemia due to chronic blood loss D50.0  Migraine without aura and without status migrainosus, not intractable G43.009    H/O tubal ligation Z98.51    Gastroesophageal reflux disease without esophagitis K21.9    Menorrhagia with regular cycle N92.0    BMI 45.0-49.9, adult (HCC) Z68.42    S/P endometrial ablation Z98.890    Essential hypertension I10    Acute relapsing multiple sclerosis ([facility] G35    Encounter for long-term (current) use of medications Z79.899    Depression, major, recurrent, mild (HCC) F33.0       Visit Vitals  BP (!) 143/91 (BP 1 Location: Left upper arm, BP Patient Position: Sitting, BP Cuff Size: Large adult)   Pulse 89   Temp (!) 95.7 °F (35.4 °C) (Temporal)   Resp 20   Ht 5' 6\" (1.676 m)   Wt 333 lb 6.4 oz (151.2 kg)   SpO2 94%   BMI 53.81 kg/m²       General appearance: alert, cooperative, no distress, appears stated age  Neurologic: Alert and oriented X 3, normal strength and tone, symmetric. Normal without focal findings. Cranial nerves 2-12 intact. Normal coordination and gait. Mental status: Alert, oriented, thought content appropriate, affect: stable, mood-congruent. Head: Normocephalic, without obvious abnormality, atraumatic  Eyes: conjunctivae/corneas clear. PERRL, EOM's intact. Neck: supple, symmetrical, trachea midline, no JVD  Lungs: clear to auscultation bilaterally  Heart: regular rate and rhythm, S1, S2 normal, no murmur, click, rub or gallop  Abdomen: soft, non-tender. Extremities: extremities normal, atraumatic, no cyanosis or edema      Assessment/Plans:    Diagnoses and all orders for this visit:    1. Annual physical exam  -     CBC W/O DIFF  -     HEMOGLOBIN A1C W/O EAG  -     LIPID PANEL  -     METABOLIC PANEL, COMPREHENSIVE  -     TSH 3RD GENERATION  -     HIV 1/2 AG/AB, 4TH GENERATION,W RFLX CONFIRM    2. Acute cystitis with hematuria  -     AMB POC URINALYSIS DIP STICK AUTO W/O MICRO  -     nitrofurantoin, macrocrystal-monohydrate, (Macrobid) 100 mg capsule;  Take 1 Cap by mouth two (2) times a day for 5 days. 3. BMI 50.0-59.9, adult ([facility]    4. Weight loss counseling, encounter for    5. Vitamin D deficiency    6. Encounter for long-term (current) use of medications  -     CBC W/O DIFF  -     HEMOGLOBIN A1C W/O EAG  -     LIPID PANEL  -     METABOLIC PANEL, COMPREHENSIVE  -     TSH 3RD GENERATION  -     HIV 1/2 AG/AB, 4TH GENERATION,W RFLX CONFIRM    7. Pure hypercholesterolemia  -     LIPID PANEL  -     METABOLIC PANEL, COMPREHENSIVE  -     TSH 3RD GENERATION    8. Screening for diabetes mellitus  -     HEMOGLOBIN A1C W/O EAG      Discussed plans, risk/benefits of treatments/observations. Through the use of shared decision making, above plans were agreed upon. Medication compliance advised. Patient verbalized understanding. Follow-up and Dispositions    · Return in about 3 weeks (around 2/25/2021).    Follow-up and Disposition History            Kirby Iniguez MD  2/4/2021

## 2021-02-04 NOTE — PROGRESS NOTES
Chief Complaint   Patient presents with    Urinary Frequency     x 1 week          1. Have you been to the ER, urgent care clinic since your last visit? Hospitalized since your last visit? No    2. Have you seen or consulted any other health care providers outside of the [address] [place] [address] since your last visit? Include any pap smears or colon screening.  No       Pt experiencing urinary urgency and cloudy urine with foul odor

## 2021-02-10 DIAGNOSIS — M48.061 FORAMINAL STENOSIS OF LUMBAR REGION: ICD-10-CM

## 2021-02-10 DIAGNOSIS — G89.29 CHRONIC BILATERAL LOW BACK PAIN WITH SCIATICA, SCIATICA LATERALITY UNSPECIFIED: ICD-10-CM

## 2021-02-10 DIAGNOSIS — M54.40 CHRONIC BILATERAL LOW BACK PAIN WITH SCIATICA, SCIATICA LATERALITY UNSPECIFIED: ICD-10-CM

## 2021-02-11 RX ORDER — DULOXETIN HYDROCHLORIDE 30 MG/1
CAPSULE, DELAYED RELEASE ORAL
Qty: 90 CAP | Refills: 0 | Status: SHIPPED | OUTPATIENT
Start: 2021-02-11 | End: 2021-05-13 | Stop reason: SDUPTHER

## 2021-02-16 DIAGNOSIS — E78.00 PURE HYPERCHOLESTEROLEMIA: ICD-10-CM

## 2021-02-16 RX ORDER — PRAVASTATIN SODIUM 10 MG/1
TABLET ORAL
Qty: 90 TAB | Refills: 1 | Status: SHIPPED | OUTPATIENT
Start: 2021-02-16 | End: 2021-07-09 | Stop reason: ALTCHOICE

## 2021-02-23 ENCOUNTER — VIRTUAL VISIT (OUTPATIENT)
Dept: FAMILY MEDICINE CLINIC | Age: 46
End: 2021-02-23
Payer: COMMERCIAL

## 2021-02-23 DIAGNOSIS — Z79.899 HIGH RISK MEDICATION USE: ICD-10-CM

## 2021-02-23 DIAGNOSIS — Z71.3 WEIGHT LOSS COUNSELING, ENCOUNTER FOR: ICD-10-CM

## 2021-02-23 DIAGNOSIS — E78.00 PURE HYPERCHOLESTEROLEMIA: ICD-10-CM

## 2021-02-23 DIAGNOSIS — F41.8 DEPRESSION WITH ANXIETY: ICD-10-CM

## 2021-02-23 DIAGNOSIS — I10 ESSENTIAL HYPERTENSION: ICD-10-CM

## 2021-02-23 DIAGNOSIS — N30.00 ACUTE CYSTITIS WITHOUT HEMATURIA: Primary | ICD-10-CM

## 2021-02-23 PROCEDURE — 99214 OFFICE O/P EST MOD 30 MIN: CPT | Performed by: FAMILY MEDICINE

## 2021-02-23 RX ORDER — CIPROFLOXACIN 500 MG/1
500 TABLET ORAL 2 TIMES DAILY
Qty: 10 TAB | Refills: 0 | Status: SHIPPED | OUTPATIENT
Start: 2021-02-23 | End: 2021-02-28

## 2021-02-23 NOTE — PROGRESS NOTES
Consent: Janet Draper, who was seen by synchronous (real-time) audio-video technology, and/or her healthcare decision maker, is aware that this patient-initiated, Telehealth encounter on 2/23/2021 is a billable service, with coverage as determined by her insurance carrier. She is aware that she may receive a bill and has provided verbal consent to proceed: Yes. Janet Draper is a 41 y.o. female    She forgot her labs  Last lab was 13 months ago. has a past medical history of BMI 45.0-49.9, adult ([facility] (2/14/2018), Essential hypertension (4/17/2018), Gastroesophageal reflux disease without esophagitis (3/16/2017), H/O tubal ligation (3/16/2017), Iron deficiency anemia due to chronic blood loss (3/16/2017), Menorrhagia with regular cycle (4/3/2017), Migraine without aura and without status migrainosus, not intractable (3/16/2017), Mild intermittent asthma without complication (5/43/0041), MS (multiple sclerosis) ([facility] (10/08), Obesity, Class III, BMI 40-49.9 (morbid obesity) ([facility] (12/3/2015), S/P endometrial ablation (4/16/2018), and Vitamin D deficiency (3/16/2017). HLD: labs monitoring     Dysuria X 1 week, urgency, frequency, odor. No new flank pain. Denies N/V, fever, chills. Denies STI. Macrobid BID X 5 day     Weight loss counseling:   She's gained more weight. She's been on topamax for 6 months. Does help with appetite, eats only once but still gaining weight. Refer to gastric bypass. HLD: tolerating pravastatin     Weight loss counseling.               She lost 6 lbs.             Discussed calories count, goal for 1400cal/day.  Exercise. Choice of food.               last visit we increased topamax 50mg bid - does help, appetite lessen               We ordered EKG in anticipation of phentermine - she didn't do them yet. She's happy with topamax for now.      depression and anxiety  We've tried a couple of meds, she was noncompliant with them.  Doesn't want anything. Report doing fine  Denies SI or HI. Cymbalta helps with pain. Also helped with her mood. She feels happy .       Chronic low back pain seeing pain specialist, had steroid injection. S/p nerve ablation on one side.  So still have burning on the other side. Gabapentin 300mg written by Avery Richey. She up it herself to BID.   Saw Dr. Neetu Rain recently, 7/20/2020  Liz Russo to pain specialist had a steroid injection, nerve block just 2 days ago.               Gabapentin increased to 600mg 4X daily.             She's been in PT for 4 weeks.      Neuro at [facility]  Migraine HA  MS managed               Gabapentin filled by her Neurologist     Saw OBGYN: had papsmear there 05/2019  Mammogram done 06/2019      Reviewed: active problem list, medication list, allergies, notes from last encounter, lab results    A comprehensive review of systems was negative except for that written in the HPI. Assessment & Plan:   Diagnoses and all orders for this visit:    1. Acute cystitis without hematuria  -     URINALYSIS W/MICROSCOPIC  -     CULTURE, URINE  -     ciprofloxacin HCl (CIPRO) 500 mg tablet; Take 1 Tab by mouth two (2) times a day for 5 days. 2. Weight loss counseling, encounter for    3. Depression with anxiety    4. Pure hypercholesterolemia    5. Essential hypertension    6. High risk medication use  -     EKG, 12 LEAD, INITIAL; Future      Follow-up and Dispositions    · Return in about 4 months (around 6/23/2021) for chronic medical conditions, sooner for weight loss or abn labs. Follow-up and Disposition History        I spent at least 25 minutes with this established patient, and >50% of the time was spent counseling and/or coordinating care regarding depression, HTN, acute UTI, labs, weight loss  712  Subjective:   Ronnie Swenson is a 41 y.o. female who was seen for Follow-up      Prior to Admission medications    Medication Sig Start Date End Date Taking?  Authorizing Provider ciprofloxacin HCl (CIPRO) 500 mg tablet Take 1 Tab by mouth two (2) times a day for 5 days. 2/23/21 2/28/21 Yes Melany Babin MD   pravastatin (PRAVACHOL) 10 mg tablet TAKE 1 TABLET BY MOUTH EVERY NIGHT 2/16/21  Yes Melany Babin MD   DULoxetine (CYMBALTA) 30 mg capsule TAKE 1 CAPSULE BY MOUTH DAILY 2/11/21  Yes Melany Babin MD   hydroCHLOROthiazide (HYDRODIURIL) 25 mg tablet TAKE 1 TABLET BY MOUTH DAILY 1/20/21  Yes Melany Babin MD   losartan (COZAAR) 100 mg tablet TAKE 1 TABLET BY MOUTH DAILY 1/19/21  Yes Melany Babin MD   omeprazole (PRILOSEC) 40 mg capsule TAKE 1 CAPSULE BY MOUTH DAILY 1/19/21  Yes Ellen Calloway MD   ergocalciferol (ERGOCALCIFEROL) 1,250 mcg (50,000 unit) capsule TAKE 1 CAPSULE BY MOUTH EVERY 7 DAYS 12/30/20  Yes Ellen Calloway MD   Advair Diskus 250-50 mcg/dose diskus inhaler INHALE 1 PUFF BY MOUTH EVERY 12 HOURS 12/10/20  Yes Ellen Calloway MD   topiramate (TOPAMAX) 50 mg tablet TAKE 1 TABLET BY MOUTH TWICE DAILY 11/17/20  Yes Ellen Calloway MD   ibuprofen-famotidine (Duexis) 800-26.6 mg tab Duexis 800 mg-26.6 mg tablet   Take 1 tablet 3 times a day by oral route as needed for 30 days. Yes Provider, Historical   lidocaine (LIDODERM) 5 % Apply patch to the affected area for 12 hours a day and remove for 12 hours a day. 9/25/20  Yes Ellen Calloway MD   gabapentin (NEURONTIN) 600 mg tablet Take 600 mg by mouth four (4) times daily. Yes Provider, Historical   cetirizine (ZYRTEC) 10 mg tablet TAKE 1 TABLET BY MOUTH EVERY DAY 8/20/20  Yes Melany Babin MD   montelukast (SINGULAIR) 10 mg tablet Take 1 Tab by mouth daily. 7/7/20  Yes Ellen Calloway MD   albuterol (Proventil HFA) 90 mcg/actuation inhaler Take 2 Puffs by inhalation every four (4) hours as needed for Wheezing (and cough).  6/11/20  Yes Ellen Calloway MD   albuterol (PROVENTIL VENTOLIN) 2.5 mg /3 mL (0.083 %) nebu 3 mL by Nebulization route every four (4) hours as needed for Wheezing. 5/22/20  Yes Ellen Calloway MD ibuprofen (MOTRIN) 800 mg tablet TK 1 T PO TID 11/22/19  Yes Provider, Historical   fluticasone propionate (FLONASE) 50 mcg/actuation nasal spray 2 Sprays by Both Nostrils route daily. 11/14/19  Yes Estrella Bui MD   ocrelizumab (OCREVUS) 30 mg/mL soln injection by IntraVENous route. Yes Provider, Historical   butalbital-acetaminophen-caffeine (FIORICET, ESGIC) -40 mg per tablet Take 1 Tab by mouth every six (6) hours as needed for Pain for up to 30 doses.  Max 6 /24 hours 6/23/18   Saira Rodriguez MD     Allergies   Allergen Reactions   Arianna Bourdon [Natalizumab] Anaphylaxis       Patient Active Problem List   Diagnosis Code    Multiple sclerosis ([facility] G35    Heavy menstrual period N92.0    Anemia D64.9    Obesity, Class III, BMI 40-49.9 (morbid obesity) ([facility]) E66.01    Mild intermittent asthma without complication Z81.36    Vitamin D deficiency E55.9    Iron deficiency anemia due to chronic blood loss D50.0    Migraine without aura and without status migrainosus, not intractable G43.009    H/O tubal ligation Z98.51    Gastroesophageal reflux disease without esophagitis K21.9    Menorrhagia with regular cycle N92.0    BMI 45.0-49.9, adult ([facility] Z68.42    S/P endometrial ablation Z98.890    Essential hypertension I10    Acute relapsing multiple sclerosis ([facility] G35    Encounter for long-term (current) use of medications Z79.899    Depression, major, recurrent, mild ([facility]) F33.0     Past Medical History:   Diagnosis Date    [facility] 45.0-49.9, [facility] 2/14/2018    Essential hypertension 4/17/2018    Gastroesophageal reflux disease without esophagitis 3/16/2017    H/O tubal ligation 3/16/2017    Iron deficiency anemia due to chronic blood loss 3/16/2017    Menorrhagia with regular cycle 4/3/2017    Migraine without aura and without status migrainosus, not intractable 3/16/2017    Mild intermittent asthma without complication 8/83/9997    MS (multiple sclerosis) ([facility] 10/08    using clinical trial medications    Obesity, Class III, BMI 40-49.9 (morbid obesity) ([facility] 12/3/2015    S/P endometrial ablation 2018    Vitamin D deficiency 3/16/2017     Past Surgical History:   Procedure Laterality Date    HX  SECTION      x3; , , & 2005    PA LAP,TUBAL CAUTERY       Objective:   Vital Signs: (As obtained by patient/caregiver at home)  There were no vitals taken for this visit. Constitutional: [x] Appears well-developed and well-nourished [x] No apparent distress        Mental status: [x] Alert and awake  [x] Oriented to person/place/time [x] Able to follow commands      Eyes:   EOM    [x]  Normal      Sclera  [x]  Normal              Discharge [x]  None visible       HENT: [x] Normocephalic, atraumatic    [] Mouth/Throat: Mucous membranes are moist    External Ears [x] Normal      Neck: [x] No visualized mass     Pulmonary/Chest: [x] Respiratory effort normal   [x] No visualized signs of difficulty breathing or respiratory distress           Musculoskeletal:   [x] Normal gait with no signs of ataxia         [x] Normal range of motion of neck         Neurological:        [x] No Facial Asymmetry (Cranial nerve 7 motor function) (limited exam due to video visit)          [x] No gaze palsy              Skin:        [x] No significant exanthematous lesions or discoloration noted on facial skin                  Psychiatric:       [x] Normal Affect [] Abnormal -        [x] No Hallucinations      We discussed the expected course, resolution and complications of the diagnosis(es) in detail. Medication risks, benefits, costs, interactions, and alternatives were discussed as indicated. I advised her to contact the office if her condition worsens, changes or fails to improve as anticipated. She expressed understanding with the diagnosis(es) and plan. Sigrid Harris is a 41 y.o. female being evaluated by a video visit encounter for concerns as above.   A caregiver was present when appropriate. Due to this being a TeleHealth encounter (During [facility] public [facility] emergency), evaluation of the following organ systems was limited: Vitals/Constitutional/EENT/Resp/CV/GI//MS/Neuro/Skin/Heme-Lymph-Imm. Pursuant to the emergency declaration under the [address] [place] [address] waiver authority and the listedplaces and [employer] Act, this Virtual  Visit was conducted, with patient's (and/or legal guardian's) consent, to reduce the patient's risk of exposure to COVID-19 and provide necessary medical care. Services were provided through a video synchronous discussion virtually to substitute for in-person clinic visit. Patient and provider were located at their individual homes.         Aroldo Rangel MD

## 2021-02-23 NOTE — PROGRESS NOTES
Chief Complaint   Patient presents with    Follow-up       1. Have you been to the ER, urgent care clinic since your last visit? Hospitalized since your last visit? no    2. Have you seen or consulted any other health care providers outside of the [address] [place] [address] since your last visit? Include any pap smears or colon screening.  no

## 2021-02-27 LAB
ALBUMIN SERPL-MCNC: 4.4 G/DL (ref 3.8–4.8)
ALBUMIN/GLOB SERPL: 1.8 {RATIO} (ref 1.2–2.2)
ALP SERPL-CCNC: 53 IU/L (ref 39–117)
ALT SERPL-CCNC: 28 IU/L (ref 0–32)
AST SERPL-CCNC: 23 IU/L (ref 0–40)
BILIRUB SERPL-MCNC: 0.4 MG/DL (ref 0–1.2)
BUN SERPL-MCNC: 11 MG/DL (ref 6–24)
BUN/CREAT SERPL: 10 (ref 9–23)
CALCIUM SERPL-MCNC: 9.7 MG/DL (ref 8.7–10.2)
CHLORIDE SERPL-SCNC: 97 MMOL/L (ref 96–106)
CHOLEST SERPL-MCNC: 252 MG/DL (ref 100–199)
CO2 SERPL-SCNC: 26 MMOL/L (ref 20–29)
CREAT SERPL-MCNC: 1.06 MG/DL (ref 0.57–1)
ERYTHROCYTE [DISTWIDTH] IN BLOOD BY AUTOMATED COUNT: 12.6 % (ref 11.7–15.4)
GLOBULIN SER CALC-MCNC: 2.4 G/DL (ref 1.5–4.5)
GLUCOSE SERPL-MCNC: 93 MG/DL (ref 65–99)
HBA1C MFR BLD: 5.1 % (ref 4.8–5.6)
HCT VFR BLD AUTO: 42.9 % (ref 34–46.6)
HDLC SERPL-MCNC: 61 MG/DL
HGB BLD-MCNC: 14.7 G/DL (ref 11.1–15.9)
HIV 1+2 AB+HIV1 P24 AG SERPL QL IA: NON REACTIVE
LDLC SERPL CALC-MCNC: 171 MG/DL (ref 0–99)
MCH RBC QN AUTO: 33.3 PG (ref 26.6–33)
MCHC RBC AUTO-ENTMCNC: 34.3 G/DL (ref 31.5–35.7)
MCV RBC AUTO: 97 FL (ref 79–97)
PLATELET # BLD AUTO: 393 X10E3/UL (ref 150–450)
POTASSIUM SERPL-SCNC: 3.7 MMOL/L (ref 3.5–5.2)
PROT SERPL-MCNC: 6.8 G/DL (ref 6–8.5)
RBC # BLD AUTO: 4.42 X10E6/UL (ref 3.77–5.28)
SODIUM SERPL-SCNC: 138 MMOL/L (ref 134–144)
TRIGL SERPL-MCNC: 111 MG/DL (ref 0–149)
TSH SERPL DL<=0.005 MIU/L-ACNC: 1.1 UIU/ML (ref 0.45–4.5)
VLDLC SERPL CALC-MCNC: 20 MG/DL (ref 5–40)
WBC # BLD AUTO: 8.7 X10E3/UL (ref 3.4–10.8)

## 2021-03-04 LAB
APPEARANCE UR: ABNORMAL
BACTERIA #/AREA URNS HPF: ABNORMAL /[HPF]
BACTERIA UR CULT: ABNORMAL
BILIRUB UR QL STRIP: NEGATIVE
CASTS URNS QL MICRO: ABNORMAL /LPF
COLOR UR: YELLOW
EPI CELLS #/AREA URNS HPF: ABNORMAL /HPF (ref 0–10)
GLUCOSE UR QL: NEGATIVE
HGB UR QL STRIP: ABNORMAL
KETONES UR QL STRIP: NEGATIVE
LEUKOCYTE ESTERASE UR QL STRIP: ABNORMAL
MICRO URNS: ABNORMAL
MUCOUS THREADS URNS QL MICRO: PRESENT
NITRITE UR QL STRIP: NEGATIVE
PH UR STRIP: 6 [PH] (ref 5–7.5)
PROT UR QL STRIP: ABNORMAL
RBC #/AREA URNS HPF: ABNORMAL /HPF (ref 0–2)
SP GR UR: 1.01 (ref 1–1.03)
UROBILINOGEN UR STRIP-MCNC: 0.2 MG/DL (ref 0.2–1)
WBC #/AREA URNS HPF: >30 /HPF (ref 0–5)

## 2021-03-15 DIAGNOSIS — E66.01 MORBID OBESITY (HCC): ICD-10-CM

## 2021-03-15 RX ORDER — TOPIRAMATE 50 MG/1
TABLET, FILM COATED ORAL
Qty: 60 TAB | Refills: 2 | Status: SHIPPED | OUTPATIENT
Start: 2021-03-15 | End: 2021-06-08 | Stop reason: SDUPTHER

## 2021-03-16 DIAGNOSIS — E78.00 PURE HYPERCHOLESTEROLEMIA: ICD-10-CM

## 2021-03-16 DIAGNOSIS — N30.00 ACUTE CYSTITIS WITHOUT HEMATURIA: Primary | ICD-10-CM

## 2021-03-16 RX ORDER — ATORVASTATIN CALCIUM 10 MG/1
10 TABLET, FILM COATED ORAL DAILY
Qty: 90 TAB | Refills: 1 | Status: SHIPPED | OUTPATIENT
Start: 2021-03-16 | End: 2021-07-09 | Stop reason: SDUPTHER

## 2021-03-16 RX ORDER — CIPROFLOXACIN 250 MG/1
250 TABLET, FILM COATED ORAL EVERY 12 HOURS
Qty: 10 TAB | Refills: 0 | Status: SHIPPED | OUTPATIENT
Start: 2021-03-16 | End: 2021-03-21

## 2021-03-25 ENCOUNTER — HOSPITAL ENCOUNTER (OUTPATIENT)
Dept: INFUSION THERAPY | Age: 46
Discharge: HOME | End: 2021-03-25
Payer: COMMERCIAL

## 2021-03-25 VITALS
BODY MASS INDEX: 53.07 KG/M2 | TEMPERATURE: 97.3 F | HEART RATE: 80 BPM | OXYGEN SATURATION: 96 % | RESPIRATION RATE: 16 BRPM | DIASTOLIC BLOOD PRESSURE: 70 MMHG | SYSTOLIC BLOOD PRESSURE: 125 MMHG | WEIGHT: 293 LBS

## 2021-03-25 PROCEDURE — 96401 CHEMO ANTI-NEOPL SQ/IM: CPT

## 2021-03-25 PROCEDURE — 74011250636 HC RX REV CODE- 250/636: Performed by: PSYCHIATRY & NEUROLOGY

## 2021-03-25 PROCEDURE — 96413 CHEMO IV INFUSION 1 HR: CPT

## 2021-03-25 PROCEDURE — 74011250637 HC RX REV CODE- 250/637: Performed by: PSYCHIATRY & NEUROLOGY

## 2021-03-25 PROCEDURE — 96361 HYDRATE IV INFUSION ADD-ON: CPT

## 2021-03-25 PROCEDURE — 96375 TX/PRO/DX INJ NEW DRUG ADDON: CPT

## 2021-03-25 RX ORDER — SODIUM CHLORIDE 0.9 % (FLUSH) 0.9 %
10 SYRINGE (ML) INJECTION AS NEEDED
Status: DISCONTINUED | OUTPATIENT
Start: 2021-03-25 | End: 2021-03-26 | Stop reason: HOSPADM

## 2021-03-25 RX ORDER — DIPHENHYDRAMINE HYDROCHLORIDE 50 MG/ML
50 INJECTION, SOLUTION INTRAMUSCULAR; INTRAVENOUS
Status: DISCONTINUED | OUTPATIENT
Start: 2021-03-25 | End: 2021-03-26 | Stop reason: HOSPADM

## 2021-03-25 RX ORDER — ACETAMINOPHEN 325 MG/1
650 TABLET ORAL
Status: DISCONTINUED | OUTPATIENT
Start: 2021-03-25 | End: 2021-03-26 | Stop reason: HOSPADM

## 2021-03-25 RX ORDER — ACETAMINOPHEN 325 MG/1
650 TABLET ORAL ONCE
Status: COMPLETED | OUTPATIENT
Start: 2021-03-25 | End: 2021-03-25

## 2021-03-25 RX ORDER — CIPROFLOXACIN 250 MG/1
250 TABLET, FILM COATED ORAL 2 TIMES DAILY
COMMUNITY
Start: 2021-03-22 | End: 2021-03-26

## 2021-03-25 RX ORDER — DIPHENHYDRAMINE HCL 25 MG
50 CAPSULE ORAL ONCE
Status: COMPLETED | OUTPATIENT
Start: 2021-03-25 | End: 2021-03-25

## 2021-03-25 RX ADMIN — METHYLPREDNISOLONE SODIUM SUCCINATE 125 MG: 125 INJECTION, POWDER, FOR SOLUTION INTRAMUSCULAR; INTRAVENOUS at 10:02

## 2021-03-25 RX ADMIN — DIPHENHYDRAMINE HYDROCHLORIDE 50 MG: 25 CAPSULE ORAL at 10:02

## 2021-03-25 RX ADMIN — OCRELIZUMAB 600 MG: 300 INJECTION INTRAVENOUS at 10:40

## 2021-03-25 RX ADMIN — Medication 10 ML: at 14:42

## 2021-03-25 RX ADMIN — Medication 10 ML: at 09:58

## 2021-03-25 RX ADMIN — ACETAMINOPHEN 650 MG: 325 TABLET ORAL at 10:02

## 2021-03-25 NOTE — PROGRESS NOTES
[facility] VISIT NOTE    0930  Pt arrived at [facility] and in no distress for Ocrevus maintenance. Assessment completed, pt c/o chronic back pain and bilateral leg weakness. She has 2 days left of a 5 day course of Cipro for a recent UTI. This is the second round of abx since the C/S showed that she needed to take Cipro. She's asymptomatic. Called and spoke with Samuel Silvestre, Dr. Shantanu Banerjee nurse who gave an ok to treat today. Blood pressure (!) 149/96, pulse 91, temperature 97.3 °F (36.3 °C), resp. rate 16, weight 149.1 kg (328 lb 12.8 oz), SpO2 96 %, not currently breastfeeding. PIV placed in left AC. Positive blood return noted. Medications received:  Tylenol  mg  Benadryl PO 50 mg  Solumedrol  mg  Ocrevus 600 mg IV titrated starting at 40 ml/hour and adding 40 ml/hour until 200 ml/hour    Patient Vitals for the past 12 hrs:   Temp Pulse Resp BP SpO2   03/25/21 1444 -- 80 16 125/70 --   03/25/21 1240 -- 85 16 127/77 --   03/25/21 1210 -- 70 16 128/77 --   03/25/21 1140 -- 72 16 139/74 --   03/25/21 1110 -- 74 16 130/70 --   03/25/21 0932 97.3 °F (36.3 °C) 91 16 (!) 149/96 96 %     Tolerated treatment well, no adverse reaction noted. PIV flushed and removed. 1445  D/C'd from [facility] and in no distress. Next appointment is 9/30/21 at 0900. Patient denied having any symptoms of COVID-19, i.e. SOB, coughing, fever, or generally not feeling well.   Also denies having been exposed to COVID-19 recently or having had any recent contact with family/friend that has a pending COVID test.

## 2021-04-16 DIAGNOSIS — J01.00 ACUTE NON-RECURRENT MAXILLARY SINUSITIS: ICD-10-CM

## 2021-04-16 DIAGNOSIS — K21.9 GASTROESOPHAGEAL REFLUX DISEASE WITHOUT ESOPHAGITIS: ICD-10-CM

## 2021-04-19 RX ORDER — CETIRIZINE HCL 10 MG
TABLET ORAL
Qty: 90 TAB | Refills: 0 | Status: SHIPPED | OUTPATIENT
Start: 2021-04-19 | End: 2021-08-12

## 2021-04-19 RX ORDER — OMEPRAZOLE 40 MG/1
CAPSULE, DELAYED RELEASE ORAL
Qty: 90 CAP | Refills: 0 | Status: SHIPPED | OUTPATIENT
Start: 2021-04-19 | End: 2021-07-09 | Stop reason: SDUPTHER

## 2021-04-19 RX ORDER — FLUTICASONE PROPIONATE 50 MCG
2 SPRAY, SUSPENSION (ML) NASAL DAILY
Qty: 3 BOTTLE | Refills: 0 | Status: SHIPPED | OUTPATIENT
Start: 2021-04-19 | End: 2021-07-09 | Stop reason: SDUPTHER

## 2021-05-13 DIAGNOSIS — G89.29 CHRONIC BILATERAL LOW BACK PAIN WITH SCIATICA, SCIATICA LATERALITY UNSPECIFIED: ICD-10-CM

## 2021-05-13 DIAGNOSIS — M48.061 FORAMINAL STENOSIS OF LUMBAR REGION: ICD-10-CM

## 2021-05-13 DIAGNOSIS — M54.40 CHRONIC BILATERAL LOW BACK PAIN WITH SCIATICA, SCIATICA LATERALITY UNSPECIFIED: ICD-10-CM

## 2021-05-17 RX ORDER — DULOXETIN HYDROCHLORIDE 30 MG/1
CAPSULE, DELAYED RELEASE ORAL
Qty: 90 CAP | Refills: 0 | Status: SHIPPED | OUTPATIENT
Start: 2021-05-17 | End: 2021-07-09 | Stop reason: SDUPTHER

## 2021-06-08 DIAGNOSIS — E66.01 MORBID OBESITY (HCC): ICD-10-CM

## 2021-06-08 RX ORDER — TOPIRAMATE 50 MG/1
TABLET, FILM COATED ORAL
Qty: 60 TABLET | Refills: 0 | Status: SHIPPED | OUTPATIENT
Start: 2021-06-08 | End: 2021-07-06 | Stop reason: SDUPTHER

## 2021-06-11 DIAGNOSIS — E55.9 VITAMIN D DEFICIENCY: ICD-10-CM

## 2021-06-14 RX ORDER — ERGOCALCIFEROL 1.25 MG/1
CAPSULE ORAL
Qty: 12 CAPSULE | Refills: 0 | Status: SHIPPED | OUTPATIENT
Start: 2021-06-14 | End: 2021-08-12 | Stop reason: SDUPTHER

## 2021-07-06 DIAGNOSIS — E66.01 MORBID OBESITY (HCC): ICD-10-CM

## 2021-07-06 RX ORDER — TOPIRAMATE 50 MG/1
TABLET, FILM COATED ORAL
Qty: 60 TABLET | Refills: 0 | Status: SHIPPED | OUTPATIENT
Start: 2021-07-06 | End: 2021-08-04

## 2021-07-06 NOTE — TELEPHONE ENCOUNTER
Pt Last seen > 5 months ago  Med refill 1 month until f/u  pls call for F/u appointment    thx    Aditya Reyes MD  4/3/2018

## 2021-07-09 ENCOUNTER — VIRTUAL VISIT (OUTPATIENT)
Dept: FAMILY MEDICINE CLINIC | Age: 46
End: 2021-07-09
Payer: COMMERCIAL

## 2021-07-09 DIAGNOSIS — J45.40 MODERATE PERSISTENT ASTHMA WITHOUT COMPLICATION: ICD-10-CM

## 2021-07-09 DIAGNOSIS — Z79.899 ENCOUNTER FOR LONG-TERM (CURRENT) USE OF MEDICATIONS: ICD-10-CM

## 2021-07-09 DIAGNOSIS — M48.061 FORAMINAL STENOSIS OF LUMBAR REGION: ICD-10-CM

## 2021-07-09 DIAGNOSIS — M54.40 CHRONIC BILATERAL LOW BACK PAIN WITH SCIATICA, SCIATICA LATERALITY UNSPECIFIED: ICD-10-CM

## 2021-07-09 DIAGNOSIS — G89.29 CHRONIC BILATERAL LOW BACK PAIN WITH SCIATICA, SCIATICA LATERALITY UNSPECIFIED: ICD-10-CM

## 2021-07-09 DIAGNOSIS — I10 ESSENTIAL HYPERTENSION: Primary | ICD-10-CM

## 2021-07-09 DIAGNOSIS — Z71.3 WEIGHT LOSS COUNSELING, ENCOUNTER FOR: ICD-10-CM

## 2021-07-09 DIAGNOSIS — K21.9 GASTROESOPHAGEAL REFLUX DISEASE WITHOUT ESOPHAGITIS: ICD-10-CM

## 2021-07-09 DIAGNOSIS — E78.00 PURE HYPERCHOLESTEROLEMIA: ICD-10-CM

## 2021-07-09 DIAGNOSIS — F41.8 DEPRESSION WITH ANXIETY: ICD-10-CM

## 2021-07-09 DIAGNOSIS — E66.01 MORBID OBESITY (HCC): ICD-10-CM

## 2021-07-09 PROCEDURE — 99214 OFFICE O/P EST MOD 30 MIN: CPT | Performed by: FAMILY MEDICINE

## 2021-07-09 RX ORDER — DULOXETIN HYDROCHLORIDE 30 MG/1
CAPSULE, DELAYED RELEASE ORAL
Qty: 90 CAPSULE | Refills: 0 | Status: SHIPPED | OUTPATIENT
Start: 2021-07-09 | End: 2021-11-09 | Stop reason: SDUPTHER

## 2021-07-09 RX ORDER — LOSARTAN POTASSIUM 100 MG/1
TABLET ORAL
Qty: 90 TABLET | Refills: 1 | Status: SHIPPED | OUTPATIENT
Start: 2021-07-09 | End: 2021-08-12

## 2021-07-09 RX ORDER — FLUTICASONE PROPIONATE 50 MCG
2 SPRAY, SUSPENSION (ML) NASAL DAILY
Qty: 3 BOTTLE | Refills: 0 | Status: SHIPPED | OUTPATIENT
Start: 2021-07-09 | End: 2021-10-04

## 2021-07-09 RX ORDER — HYDROCHLOROTHIAZIDE 25 MG/1
TABLET ORAL
Qty: 90 TABLET | Refills: 1 | Status: SHIPPED | OUTPATIENT
Start: 2021-07-09 | End: 2021-11-09 | Stop reason: SDUPTHER

## 2021-07-09 RX ORDER — OMEPRAZOLE 40 MG/1
CAPSULE, DELAYED RELEASE ORAL
Qty: 90 CAPSULE | Refills: 0 | Status: SHIPPED | OUTPATIENT
Start: 2021-07-09 | End: 2021-08-12

## 2021-07-09 RX ORDER — FLUTICASONE PROPIONATE AND SALMETEROL 250; 50 UG/1; UG/1
POWDER RESPIRATORY (INHALATION)
Qty: 60 EACH | Refills: 2 | Status: SHIPPED | OUTPATIENT
Start: 2021-07-09 | End: 2021-09-28

## 2021-07-09 RX ORDER — ATORVASTATIN CALCIUM 10 MG/1
10 TABLET, FILM COATED ORAL DAILY
Qty: 90 TABLET | Refills: 1 | Status: SHIPPED | OUTPATIENT
Start: 2021-07-09 | End: 2021-11-09 | Stop reason: SDUPTHER

## 2021-07-09 NOTE — PROGRESS NOTES
Chief Complaint   Patient presents with    Medication Refill       1. Have you been to the ER, urgent care clinic since your last visit? Hospitalized since your last visit? No     2. Have you seen or consulted any other health care providers outside of the [address] [place] [address] since your last visit? Include any pap smears or colon screening.  No

## 2021-07-09 NOTE — PROGRESS NOTES
Consent: Shannan Franklin, who was seen by synchronous (real-time) audio-video technology, and/or her healthcare decision maker, is aware that this patient-initiated, Telehealth encounter on 7/9/2021 is a billable service, with coverage as determined by her insurance carrier. She is aware that she may receive a bill and has provided verbal consent to proceed: Yes. Shannan Franklin is a 40 y.o. female       has a past medical history of BMI 45.0-49.9, adult ([facility] (2/14/2018), Essential hypertension (4/17/2018), Gastroesophageal reflux disease without esophagitis (3/16/2017), H/O tubal ligation (3/16/2017), Iron deficiency anemia due to chronic blood loss (3/16/2017), Menorrhagia with regular cycle (4/3/2017), Migraine without aura and without status migrainosus, not intractable (3/16/2017), Mild intermittent asthma without complication (5/69/8481), MS (multiple sclerosis) ([facility] (10/08), Obesity, Class III, BMI 40-49.9 (morbid obesity) ([facility] (12/3/2015), S/P endometrial ablation (4/16/2018), and Vitamin D deficiency (3/16/2017). Vaccinated for COVID    Asthma:   Advair, albuterol    HLD: LDL worsen  LIpitor 10mg      Weight loss counseling:   She's gained more weight. She's been on topamax for 6 months. Does help with appetite, eats only once but still gaining weight. Refer to gastric bypass. Weight loss counseling.               Discussed calories count, goal for 1400cal/day.  Exercise. Choice of food.               last visit we increased topamax 50mg bid - does help, appetite lessen               We ordered EKG in anticipation of phentermine - she didn't do them yet. She's happy with topamax for now.      depression and anxiety  We've tried a couple of meds, she was noncompliant with them. Doesn't want anything. Report doing fine  Denies SI or HI. Cymbalta helps with pain. Also helped with her mood.   She feels happy .       Chronic low back pain seeing pain specialist, had steroid injection. S/p nerve ablation on one side.  So still have burning on the other side. Gabapentin 300mg written by Radha Woodward. She up it herself to BID.   Saw Dr. David Brown recently, 7/20/2020  Zee Connors to pain specialist had a steroid injection, nerve block just 2 days ago.               Gabapentin increased to 600mg 4X daily.             She's been in PT for 4 weeks.      Neuro at [facility]  Migraine HA  MS managed               Gabapentin filled by her Neurologist     Saw OBGYN: had papsmear there 05/2019  Mammogram done 06/2019      Reviewed: active problem list, medication list, allergies, notes from last encounter, lab results    A comprehensive review of systems was negative except for that written in the HPI. Assessment & Plan:   Diagnoses and all orders for this visit:    1. Essential hypertension  -     hydroCHLOROthiazide (HYDRODIURIL) 25 mg tablet; TAKE 1 TABLET BY MOUTH DAILY  -     losartan (COZAAR) 100 mg tablet; TAKE 1 TABLET BY MOUTH DAILY  -     METABOLIC PANEL, COMPREHENSIVE; Future    2. Moderate persistent asthma without complication  -     fluticasone propion-salmeteroL (Advair Diskus) 250-50 mcg/dose diskus inhaler; INHALE 1 PUFF BY MOUTH EVERY 12 HOURS  -     fluticasone propionate (FLONASE) 50 mcg/actuation nasal spray; 2 Sprays by Both Nostrils route daily. 3. Depression with anxiety  -     METABOLIC PANEL, COMPREHENSIVE; Future    4. Morbid obesity ([facility]  -     REFERRAL TO BARIATRIC SURGERY    5. Weight loss counseling, encounter for  -     REFERRAL TO BARIATRIC SURGERY    6. BMI 50.0-59.9, adult (HCC)  -     REFERRAL TO BARIATRIC SURGERY    7. Pure hypercholesterolemia  -     atorvastatin (LIPITOR) 10 mg tablet; Take 1 Tablet by mouth daily.  -     METABOLIC PANEL, COMPREHENSIVE; Future    8. Gastroesophageal reflux disease without esophagitis  -     omeprazole (PRILOSEC) 40 mg capsule; TAKE 1 CAPSULE BY MOUTH DAILY    9.  Foraminal stenosis of lumbar region  -     DULoxetine (CYMBALTA) 30 mg capsule; TAKE 1 CAPSULE BY MOUTH DAILY    10. Chronic bilateral low back pain with sciatica, sciatica laterality unspecified  -     DULoxetine (CYMBALTA) 30 mg capsule; TAKE 1 CAPSULE BY MOUTH DAILY    11. Encounter for long-term (current) use of medications  -     METABOLIC PANEL, COMPREHENSIVE; Future      Follow-up and Dispositions    · Return in about 4 months (around 11/9/2021) for anxiety, depression, HTN, HLD, meds, sooner if bariatric surgery. 712  Subjective:   Brady Palacios is a 40 y.o. female who was seen for Medication Refill      Prior to Admission medications    Medication Sig Start Date End Date Taking? Authorizing Provider   atorvastatin (LIPITOR) 10 mg tablet Take 1 Tablet by mouth daily. 7/9/21  Yes Rich Gutierrez MD   hydroCHLOROthiazide (HYDRODIURIL) 25 mg tablet TAKE 1 TABLET BY MOUTH DAILY 7/9/21  Yes Anabel Babin MD   losartan (COZAAR) 100 mg tablet TAKE 1 TABLET BY MOUTH DAILY 7/9/21  Yes Anabel Babin MD   fluticasone propion-salmeteroL (Advair Diskus) 250-50 mcg/dose diskus inhaler INHALE 1 PUFF BY MOUTH EVERY 12 HOURS 7/9/21  Yes Anabel Babin MD   omeprazole (PRILOSEC) 40 mg capsule TAKE 1 CAPSULE BY MOUTH DAILY 7/9/21  Yes Anabel aBbin MD   fluticasone propionate (FLONASE) 50 mcg/actuation nasal spray 2 Sprays by Both Nostrils route daily. 7/9/21  Yes Rich Gutierrez MD   DULoxetine (CYMBALTA) 30 mg capsule TAKE 1 CAPSULE BY MOUTH DAILY 7/9/21  Yes Anabel Babin MD   topiramate (TOPAMAX) 50 mg tablet TAKE 1 TABLET BY MOUTH TWICE DAILY 7/6/21  Yes Anabel Babin MD   ergocalciferol (ERGOCALCIFEROL) 1,250 mcg (50,000 unit) capsule TAKE 1 CAPSULE BY MOUTH EVERY 7 DAYS 6/14/21  Yes Rich Gutierrez MD   cetirizine (ZYRTEC) 10 mg tablet TAKE 1 TABLET BY MOUTH EVERY DAY 4/19/21  Yes Rich Gutierrez MD   lidocaine (LIDODERM) 5 % Apply patch to the affected area for 12 hours a day and remove for 12 hours a day.  9/25/20  Yes Anabel Babin, MD   gabapentin (NEURONTIN) 600 mg tablet Take 600 mg by mouth four (4) times daily. Yes Provider, Historical   montelukast (SINGULAIR) 10 mg tablet Take 1 Tab by mouth daily. 7/7/20  Yes Nicolette Loyd MD   albuterol (Proventil HFA) 90 mcg/actuation inhaler Take 2 Puffs by inhalation every four (4) hours as needed for Wheezing (and cough). 6/11/20  Yes Nicolette Loyd MD   albuterol (PROVENTIL VENTOLIN) 2.5 mg /3 mL (0.083 %) nebu 3 mL by Nebulization route every four (4) hours as needed for Wheezing. 5/22/20  Yes Jhony Babin MD   ibuprofen (MOTRIN) 800 mg tablet TK 1 T PO TID 11/22/19  Yes Provider, Historical   ocrelizumab (OCREVUS) 30 mg/mL soln injection by IntraVENous route. Yes Provider, Historical   butalbital-acetaminophen-caffeine (FIORICET, ESGIC) -40 mg per tablet Take 1 Tab by mouth every six (6) hours as needed for Pain for up to 30 doses.  Max 6 /24 hours  Patient not taking: Reported on 7/9/2021 6/23/18   Rawleigh Lesch, MD     Allergies   Allergen Reactions   Lupillo Loyd [Natalizumab] Anaphylaxis       Patient Active Problem List   Diagnosis Code    Multiple sclerosis ([facility] G35    Heavy menstrual period N92.0    Anemia D64.9    Obesity, Class III, BMI 40-49.9 (morbid obesity) ([facility]) E66.01    Mild intermittent asthma without complication J58.67    Vitamin D deficiency E55.9    Iron deficiency anemia due to chronic blood loss D50.0    Migraine without aura and without status migrainosus, not intractable G43.009    H/O tubal ligation Z98.51    Gastroesophageal reflux disease without esophagitis K21.9    Menorrhagia with regular cycle N92.0    BMI 45.0-49.9, adult ([facility]) Z68.42    S/P endometrial ablation Z98.890    Essential hypertension I10    Acute relapsing multiple sclerosis ([facility] G35    Encounter for long-term (current) use of medications Z79.899    Depression, major, recurrent, mild ([facility]) F33.0     Past Medical History:   Diagnosis Date    BMI 45.0-49.9, adult ([facility] 2018    Essential hypertension 2018    Gastroesophageal reflux disease without esophagitis 3/16/2017    H/O tubal ligation 3/16/2017    Iron deficiency anemia due to chronic blood loss 3/16/2017    Menorrhagia with regular cycle 4/3/2017    Migraine without aura and without status migrainosus, not intractable 3/16/2017    Mild intermittent asthma without complication 2097    MS (multiple sclerosis) ([facility] 10/08    using clinical trial medications    Obesity, Class III, BMI 40-49.9 (morbid obesity) ([facility] 12/3/2015    S/P endometrial ablation 2018    Vitamin D deficiency 3/16/2017     Past Surgical History:   Procedure Laterality Date    HX  SECTION      x3; , , &     SC LAP,TUBAL CAUTERY       Objective:   Vital Signs: (As obtained by patient/caregiver at home)  There were no vitals taken for this visit.      Constitutional: [x] Appears well-developed and well-nourished [x] No apparent distress        Mental status: [x] Alert and awake  [x] Oriented to person/place/time [x] Able to follow commands      Eyes:   EOM    [x]  Normal      Sclera  [x]  Normal              Discharge [x]  None visible       HENT: [x] Normocephalic, atraumatic    [] Mouth/Throat: Mucous membranes are moist    External Ears [x] Normal      Neck: [x] No visualized mass     Pulmonary/Chest: [x] Respiratory effort normal   [x] No visualized signs of difficulty breathing or respiratory distress           Musculoskeletal:   [x] Normal gait with no signs of ataxia         [x] Normal range of motion of neck         Neurological:        [x] No Facial Asymmetry (Cranial nerve 7 motor function) (limited exam due to video visit)          [x] No gaze palsy              Skin:        [x] No significant exanthematous lesions or discoloration noted on facial skin                  Psychiatric:       [x] Normal Affect [] Abnormal -        [x] No Hallucinations      We discussed the expected course, resolution and complications of the diagnosis(es) in detail. Medication risks, benefits, costs, interactions, and alternatives were discussed as indicated. I advised her to contact the office if her condition worsens, changes or fails to improve as anticipated. She expressed understanding with the diagnosis(es) and plan. Bayron Ugarte is a 40 y.o. female being evaluated by a video visit encounter for concerns as above. A caregiver was present when appropriate. Due to this being a TeleHealth encounter (During SHKYS-31 public health emergency), evaluation of the following organ systems was limited: Vitals/Constitutional/EENT/Resp/CV/GI//MS/Neuro/Skin/Heme-Lymph-Imm. Pursuant to the emergency declaration under the [facility], [facility] waiver authority and the [employer] Act, this Virtual  Visit was conducted, with patient's (and/or legal guardian's) consent, to reduce the patient's risk of exposure to COVID-19 and provide necessary medical care. Services were provided through a video synchronous discussion virtually to substitute for in-person clinic visit. Patient and provider were located at their individual homes.         Tato Fernandez MD

## 2021-08-02 DIAGNOSIS — E66.01 MORBID OBESITY (HCC): ICD-10-CM

## 2021-08-04 RX ORDER — TOPIRAMATE 50 MG/1
TABLET, FILM COATED ORAL
Qty: 60 TABLET | Refills: 0 | Status: SHIPPED | OUTPATIENT
Start: 2021-08-04 | End: 2021-08-31

## 2021-08-12 DIAGNOSIS — I10 ESSENTIAL HYPERTENSION: ICD-10-CM

## 2021-08-12 DIAGNOSIS — K21.9 GASTROESOPHAGEAL REFLUX DISEASE WITHOUT ESOPHAGITIS: ICD-10-CM

## 2021-08-12 DIAGNOSIS — E55.9 VITAMIN D DEFICIENCY: ICD-10-CM

## 2021-08-12 RX ORDER — CETIRIZINE HCL 10 MG
TABLET ORAL
Qty: 90 TABLET | Refills: 0 | Status: SHIPPED | OUTPATIENT
Start: 2021-08-12 | End: 2021-12-07

## 2021-08-12 RX ORDER — ERGOCALCIFEROL 1.25 MG/1
CAPSULE ORAL
Qty: 12 CAPSULE | Refills: 0 | Status: SHIPPED | OUTPATIENT
Start: 2021-08-12 | End: 2021-11-09 | Stop reason: SDUPTHER

## 2021-08-12 RX ORDER — OMEPRAZOLE 40 MG/1
CAPSULE, DELAYED RELEASE ORAL
Qty: 90 CAPSULE | Refills: 0 | Status: SHIPPED | OUTPATIENT
Start: 2021-08-12 | End: 2021-10-05

## 2021-08-12 RX ORDER — LOSARTAN POTASSIUM 100 MG/1
TABLET ORAL
Qty: 90 TABLET | Refills: 1 | Status: SHIPPED | OUTPATIENT
Start: 2021-08-12 | End: 2021-11-09 | Stop reason: SDUPTHER

## 2021-08-27 LAB
ALBUMIN SERPL-MCNC: 4.5 G/DL (ref 3.8–4.8)
ALBUMIN/GLOB SERPL: 2 {RATIO} (ref 1.2–2.2)
ALP SERPL-CCNC: 58 IU/L (ref 48–121)
ALT SERPL-CCNC: 34 IU/L (ref 0–32)
AST SERPL-CCNC: 29 IU/L (ref 0–40)
BILIRUB SERPL-MCNC: 0.5 MG/DL (ref 0–1.2)
BUN SERPL-MCNC: 10 MG/DL (ref 6–24)
BUN/CREAT SERPL: 9 (ref 9–23)
CALCIUM SERPL-MCNC: 9.9 MG/DL (ref 8.7–10.2)
CHLORIDE SERPL-SCNC: 102 MMOL/L (ref 96–106)
CO2 SERPL-SCNC: 28 MMOL/L (ref 20–29)
CREAT SERPL-MCNC: 1.06 MG/DL (ref 0.57–1)
GLOBULIN SER CALC-MCNC: 2.2 G/DL (ref 1.5–4.5)
GLUCOSE SERPL-MCNC: 107 MG/DL (ref 65–99)
POTASSIUM SERPL-SCNC: 3.8 MMOL/L (ref 3.5–5.2)
PROT SERPL-MCNC: 6.7 G/DL (ref 6–8.5)
SODIUM SERPL-SCNC: 144 MMOL/L (ref 134–144)

## 2021-08-31 DIAGNOSIS — E66.01 MORBID OBESITY (HCC): ICD-10-CM

## 2021-08-31 RX ORDER — TOPIRAMATE 50 MG/1
TABLET, FILM COATED ORAL
Qty: 60 TABLET | Refills: 2 | Status: SHIPPED | OUTPATIENT
Start: 2021-08-31 | End: 2021-11-09 | Stop reason: SDUPTHER

## 2021-09-23 RX ORDER — ACETAMINOPHEN 325 MG/1
650 TABLET ORAL ONCE
Status: COMPLETED | OUTPATIENT
Start: 2021-09-30 | End: 2021-09-30

## 2021-09-23 RX ORDER — DIPHENHYDRAMINE HCL 25 MG
50 CAPSULE ORAL ONCE
Status: COMPLETED | OUTPATIENT
Start: 2021-09-30 | End: 2021-09-30

## 2021-09-28 DIAGNOSIS — J45.40 MODERATE PERSISTENT ASTHMA WITHOUT COMPLICATION: ICD-10-CM

## 2021-09-28 RX ORDER — FLUTICASONE PROPIONATE AND SALMETEROL 250; 50 UG/1; UG/1
POWDER RESPIRATORY (INHALATION)
Qty: 60 EACH | Refills: 2 | Status: SHIPPED | OUTPATIENT
Start: 2021-09-28

## 2021-09-30 ENCOUNTER — HOSPITAL ENCOUNTER (OUTPATIENT)
Dept: INFUSION THERAPY | Age: 46
Discharge: HOME | End: 2021-09-30
Payer: COMMERCIAL

## 2021-09-30 VITALS
SYSTOLIC BLOOD PRESSURE: 139 MMHG | WEIGHT: 293 LBS | OXYGEN SATURATION: 95 % | HEIGHT: 66 IN | TEMPERATURE: 97 F | BODY MASS INDEX: 47.09 KG/M2 | DIASTOLIC BLOOD PRESSURE: 82 MMHG | RESPIRATION RATE: 16 BRPM | HEART RATE: 82 BPM

## 2021-09-30 PROCEDURE — 96375 TX/PRO/DX INJ NEW DRUG ADDON: CPT

## 2021-09-30 PROCEDURE — 74011250636 HC RX REV CODE- 250/636: Performed by: PSYCHIATRY & NEUROLOGY

## 2021-09-30 PROCEDURE — 96415 CHEMO IV INFUSION ADDL HR: CPT

## 2021-09-30 PROCEDURE — 74011250637 HC RX REV CODE- 250/637: Performed by: PSYCHIATRY & NEUROLOGY

## 2021-09-30 PROCEDURE — 96413 CHEMO IV INFUSION 1 HR: CPT

## 2021-09-30 RX ORDER — SODIUM CHLORIDE 0.9 % (FLUSH) 0.9 %
10-40 SYRINGE (ML) INJECTION AS NEEDED
Status: DISCONTINUED | OUTPATIENT
Start: 2021-09-30 | End: 2021-10-01 | Stop reason: HOSPADM

## 2021-09-30 RX ADMIN — Medication 10 ML: at 14:16

## 2021-09-30 RX ADMIN — DIPHENHYDRAMINE HYDROCHLORIDE 50 MG: 25 CAPSULE ORAL at 09:58

## 2021-09-30 RX ADMIN — ACETAMINOPHEN 650 MG: 325 TABLET ORAL at 09:58

## 2021-09-30 RX ADMIN — OCRELIZUMAB 600 MG: 300 INJECTION INTRAVENOUS at 10:30

## 2021-09-30 RX ADMIN — Medication 10 ML: at 09:53

## 2021-09-30 RX ADMIN — METHYLPREDNISOLONE SODIUM SUCCINATE 125 MG: 125 INJECTION, POWDER, FOR SOLUTION INTRAMUSCULAR; INTRAVENOUS at 09:56

## 2021-09-30 NOTE — PROGRESS NOTES
\[facility] [place] [facility]\"" Progress Note    Date: 2021    Name: Page Teague    MRN: [id number]         : 1975    Ms. Lopez arrived (ambulation) at 0935 and in no distress for Ocrevus. Assessment was completed, no acute issues at this time, no new complaints voiced. Covid Screening    Patient denied having any symptoms of COVID-19, i.e. SOB, coughing, fever, or generally not feeling well. Also denies having been exposed to COVID-19 recently or having had any recent contact with family/friend that has a pending COVID test.    #24 PIV established in left AC, flushed and + blood return    Ms. Mimi Parra vitals were reviewed. Patient Vitals for the past 12 hrs:   Temp Pulse Resp BP SpO2   21 1415 -- 82 16 139/82 95 %   21 1230 -- 74 16 (!) 142/93 96 %   21 1200 -- 78 16 138/86 96 %   21 1130 -- 74 16 (!) 140/92 96 %   21 1100 -- 73 18 (!) 148/97 97 %   21 0935 97 °F (36.1 °C) 90 20 (!) 143/103 94 %       No labs ordered    Pt denied taking any antibiotics or having any active infections at this time. Pre-medications  were administered as ordered and Aurelio Evans was initiated.      Medication:  Medications Administered     acetaminophen (TYLENOL) tablet 650 mg     Admin Date  2021 Action  Given Dose  650 mg Route  Oral Administered By  Welby Scheuermann          diphenhydrAMINE (BENADRYL) capsule 50 mg     Admin Date  2021 Action  Given Dose  50 mg Route  Oral Administered By  Welby Scheuermann          methylPREDNISolone (PF) (Solu-MEDROL) injection 125 mg     Admin Date  2021 Action  Given Dose  125 mg Route  IntraVENous Administered By  Kyla Cabot R          ocrelizumab (OCREVUS) 600 mg in 0.9% sodium chloride 500 mL infusion     Admin Date  2021 Action  New Bag Dose  600 mg Route  IntraVENous Administered By  Kyla Cabot R          sodium chloride (NS) flush 10-40 mL     Admin Date  2021 Action  Given Dose  10 mL Route  IntraVENous Administered By  Jong Beams Date  09/30/2021 Action  Given Dose  10 mL Route  IntraVENous Administered By  Tomasz ZAPATA              Pt tolerated treatment well. Declined 60 minute observation period. Ms. Celestina Chase tolerated treatment well and was discharged from [place] [id number] in stable condition at 1420. She is aware of when to return for her next appointment.     Future Appointments   Date Time Provider Giovanna Luna   11/9/2021 12:40 PM Felix Jama MD [facility]   3/31/2022  9:00 AM STORMY ARRIETA [facility] Lilo Meza  September 30, 2021

## 2021-10-05 DIAGNOSIS — K21.9 GASTROESOPHAGEAL REFLUX DISEASE WITHOUT ESOPHAGITIS: ICD-10-CM

## 2021-10-05 RX ORDER — OMEPRAZOLE 40 MG/1
CAPSULE, DELAYED RELEASE ORAL
Qty: 90 CAPSULE | Refills: 0 | Status: SHIPPED | OUTPATIENT
Start: 2021-10-05 | End: 2021-12-30

## 2021-11-09 ENCOUNTER — VIRTUAL VISIT (OUTPATIENT)
Dept: FAMILY MEDICINE CLINIC | Age: 46
End: 2021-11-09
Payer: COMMERCIAL

## 2021-11-09 DIAGNOSIS — E78.00 PURE HYPERCHOLESTEROLEMIA: ICD-10-CM

## 2021-11-09 DIAGNOSIS — Z71.3 WEIGHT LOSS COUNSELING, ENCOUNTER FOR: ICD-10-CM

## 2021-11-09 DIAGNOSIS — E55.9 VITAMIN D DEFICIENCY: ICD-10-CM

## 2021-11-09 DIAGNOSIS — E66.01 MORBID OBESITY (HCC): ICD-10-CM

## 2021-11-09 DIAGNOSIS — I10 ESSENTIAL HYPERTENSION: Primary | ICD-10-CM

## 2021-11-09 DIAGNOSIS — Z79.899 LONG-TERM USE OF HIGH-RISK MEDICATION: ICD-10-CM

## 2021-11-09 DIAGNOSIS — J45.41 ASTHMA IN ADULT, MODERATE PERSISTENT, WITH ACUTE EXACERBATION: ICD-10-CM

## 2021-11-09 DIAGNOSIS — G89.29 CHRONIC BILATERAL LOW BACK PAIN WITH SCIATICA, SCIATICA LATERALITY UNSPECIFIED: ICD-10-CM

## 2021-11-09 DIAGNOSIS — M54.40 CHRONIC BILATERAL LOW BACK PAIN WITH SCIATICA, SCIATICA LATERALITY UNSPECIFIED: ICD-10-CM

## 2021-11-09 DIAGNOSIS — M48.061 FORAMINAL STENOSIS OF LUMBAR REGION: ICD-10-CM

## 2021-11-09 DIAGNOSIS — J31.0 CHRONIC RHINITIS: ICD-10-CM

## 2021-11-09 PROCEDURE — 99214 OFFICE O/P EST MOD 30 MIN: CPT | Performed by: FAMILY MEDICINE

## 2021-11-09 RX ORDER — ALBUTEROL SULFATE 0.83 MG/ML
2.5 SOLUTION RESPIRATORY (INHALATION)
Qty: 200 EACH | Refills: 0 | Status: SHIPPED | OUTPATIENT
Start: 2021-11-09

## 2021-11-09 RX ORDER — LOSARTAN POTASSIUM 100 MG/1
100 TABLET ORAL DAILY
Qty: 90 TABLET | Refills: 1 | Status: SHIPPED | OUTPATIENT
Start: 2021-11-09 | End: 2022-01-05

## 2021-11-09 RX ORDER — FLUTICASONE PROPIONATE 50 MCG
SPRAY, SUSPENSION (ML) NASAL
Qty: 48 G | Refills: 1 | Status: SHIPPED | OUTPATIENT
Start: 2021-11-09 | End: 2022-04-19

## 2021-11-09 RX ORDER — ATORVASTATIN CALCIUM 10 MG/1
10 TABLET, FILM COATED ORAL DAILY
Qty: 90 TABLET | Refills: 1 | Status: SHIPPED | OUTPATIENT
Start: 2021-11-09 | End: 2022-03-08 | Stop reason: SDUPTHER

## 2021-11-09 RX ORDER — TOPIRAMATE 50 MG/1
50 TABLET, FILM COATED ORAL 2 TIMES DAILY
Qty: 180 TABLET | Refills: 1 | Status: SHIPPED | OUTPATIENT
Start: 2021-11-09 | End: 2022-03-08 | Stop reason: SDUPTHER

## 2021-11-09 RX ORDER — ALBUTEROL SULFATE 90 UG/1
2 AEROSOL, METERED RESPIRATORY (INHALATION)
Qty: 1 EACH | Refills: 5 | Status: SHIPPED | OUTPATIENT
Start: 2021-11-09 | End: 2022-03-30

## 2021-11-09 RX ORDER — HYDROCHLOROTHIAZIDE 25 MG/1
TABLET ORAL
Qty: 90 TABLET | Refills: 1 | Status: SHIPPED | OUTPATIENT
Start: 2021-11-09 | End: 2021-12-31

## 2021-11-09 RX ORDER — ERGOCALCIFEROL 1.25 MG/1
CAPSULE ORAL
Qty: 12 CAPSULE | Refills: 1 | Status: SHIPPED | OUTPATIENT
Start: 2021-11-09 | End: 2022-03-08 | Stop reason: SDUPTHER

## 2021-11-09 RX ORDER — IBUPROFEN AND FAMOTIDINE 26.6; 8 MG/1; MG/1
TABLET, FILM COATED ORAL
COMMUNITY

## 2021-11-09 RX ORDER — DULOXETIN HYDROCHLORIDE 30 MG/1
CAPSULE, DELAYED RELEASE ORAL
Qty: 90 CAPSULE | Refills: 1 | Status: SHIPPED | OUTPATIENT
Start: 2021-11-09 | End: 2021-12-11 | Stop reason: SDUPTHER

## 2021-11-09 RX ORDER — MONTELUKAST SODIUM 10 MG/1
10 TABLET ORAL DAILY
Qty: 30 TABLET | Refills: 5 | Status: SHIPPED | OUTPATIENT
Start: 2021-11-09 | End: 2022-05-09

## 2021-11-09 NOTE — PROGRESS NOTES
Chief Complaint   Patient presents with    Anxiety    Depression    Hypertension    Cholesterol Problem     Med refill    1. Have you been to the ER, urgent care clinic since your last visit? Hospitalized since your last visit? No     2. Have you seen or consulted any other health care providers outside of the [address] [place] [address] since your last visit? Include any pap smears or colon screening.  Yes

## 2021-11-09 NOTE — PROGRESS NOTES
Consent: Annette Sheffield, who was seen by synchronous (real-time) audio-video technology, and/or her healthcare decision maker, is aware that this patient-initiated, Telehealth encounter on 11/9/2021 is a billable service, with coverage as determined by her insurance carrier. She is aware that she may receive a bill and has provided verbal consent to proceed: Yes. Annette Sheffield is a 40 y.o. female    Vaccinated for [place]     but  in [facility]     has a past medical history of BMI 45.0-49.9, adult ([facility] (2/14/2018), Essential hypertension (4/17/2018), Gastroesophageal reflux disease without esophagitis (3/16/2017), H/O tubal ligation (3/16/2017), Iron deficiency anemia due to chronic blood loss (3/16/2017), Menorrhagia with regular cycle (4/3/2017), Migraine without aura and without status migrainosus, not intractable (3/16/2017), Mild intermittent asthma without complication (6/61/3373), MS (multiple sclerosis) ([facility] (10/08), Obesity, Class III, BMI 40-49.9 (morbid obesity) ([facility] (12/3/2015), S/P endometrial ablation (4/16/2018), and Vitamin D deficiency (3/16/2017). Asthma: mild-moderate persistent w/o complication  Advair, albuterol    HTN: 131/92 w home BP cuff today  HCTZ, losartan    HLD: LDL worsen  LIpitor 10mg      Weight loss counseling:   She's gained more weight. She's been on topamax for 6 months. Does help with appetite, eats only once but still gaining weight. Refer to gastric bypass. She never did call. Again reminded need to see us 3X for letter or support. Weight loss counseling.               Discussed calories count, goal for 1400cal/day.  Exercise.  Choice of food.               last visit we increased topamax 50mg bid - does help, appetite lessen               We ordered EKG in anticipation of phentermine - she didn't do them yet.               she'll let us know when EKG done so we can start phentermine     depression and anxiety  We've tried a couple of meds, she was noncompliant with them. Doesn't want anything. Report doing fine  Denies SI or HI. Cymbalta helps with pain. Also helped with her mood. She feels happy .     GERD: prilosec 40mg    GARRY on CPAP    Chronic low back pain seeing pain specialist, had steroid injection. S/p nerve ablation on one side.  So still have burning on the other side. Gabapentin 300mg written by Alfredo Snow. She up it herself to BID.   Saw Dr. Omaira Vallejo recently, 7/20/2020  Meredith Sparks to pain specialist had a steroid injection, nerve block just 2 days ago.               Gabapentin increased to 600mg 4X daily.             She's been in PT for 4 weeks.      Neuro at [facility]  Migraine HA  MS managed               Gabapentin filled by her Neurologist     Saw OBGYN: had papsmear there 05/2019  Mammogram done 06/2019      Reviewed: active problem list, medication list, allergies, notes from last encounter, lab results    A comprehensive review of systems was negative except for that written in the HPI. Assessment & Plan:   Diagnoses and all orders for this visit:    1. Essential hypertension  -     hydroCHLOROthiazide (HYDRODIURIL) 25 mg tablet; TAKE 1 TABLET BY MOUTH DAILY  -     losartan (COZAAR) 100 mg tablet; Take 1 Tablet by mouth daily.  -     METABOLIC PANEL, COMPREHENSIVE; Future  -     TSH 3RD GENERATION; Future    2. Foraminal stenosis of lumbar region  -     DULoxetine (CYMBALTA) 30 mg capsule; TAKE 1 CAPSULE BY MOUTH DAILY    3. Chronic bilateral low back pain with sciatica, sciatica laterality unspecified  -     DULoxetine (CYMBALTA) 30 mg capsule; TAKE 1 CAPSULE BY MOUTH DAILY    4. Morbid obesity (HCC)  -     topiramate (TOPAMAX) 50 mg tablet; Take 1 Tablet by mouth two (2) times a day. -     EKG, 12 LEAD, INITIAL; Future  -     METABOLIC PANEL, COMPREHENSIVE; Future  -     TSH 3RD GENERATION; Future    5. Weight loss counseling, encounter for  -     EKG, 12 LEAD, INITIAL;  Future  -     METABOLIC PANEL, COMPREHENSIVE; Future  -     TSH 3RD GENERATION; Future    6. Asthma in adult, moderate persistent, with acute exacerbation  -     albuterol (PROVENTIL VENTOLIN) 2.5 mg /3 mL (0.083 %) nebu; 3 mL by Nebulization route every four (4) hours as needed for Wheezing or Shortness of Breath. -     albuterol (Proventil HFA) 90 mcg/actuation inhaler; Take 2 Puffs by inhalation every four (4) hours as needed for Wheezing or Shortness of Breath (and cough). -     montelukast (SINGULAIR) 10 mg tablet; Take 1 Tablet by mouth daily. 7. Pure hypercholesterolemia  -     atorvastatin (LIPITOR) 10 mg tablet; Take 1 Tablet by mouth daily.  -     METABOLIC PANEL, COMPREHENSIVE; Future  -     TSH 3RD GENERATION; Future  -     LIPID PANEL; Future    8. Vitamin D deficiency  -     ergocalciferol (ERGOCALCIFEROL) 1,250 mcg (50,000 unit) capsule; TAKE 1 CAPSULE BY MOUTH EVERY 7 DAYS    9. Chronic rhinitis  -     fluticasone propionate (FLONASE) 50 mcg/actuation nasal spray; SHAKE LIQUID AND USE 2 SPRAYS IN EACH NOSTRIL DAILY    10. Long-term use of high-risk medication  -     EKG, 12 LEAD, INITIAL; Future  -     METABOLIC PANEL, COMPREHENSIVE; Future  -     TSH 3RD GENERATION; Future  -     LIPID PANEL; Future      Follow-up and Dispositions    · Return in about 4 months (around 3/9/2022) for annual exam, sooner as needed. 712  Subjective:   Nguyen Aguayo is a 40 y.o. female who was seen for Anxiety, Depression, Hypertension, and Cholesterol Problem      Prior to Admission medications    Medication Sig Start Date End Date Taking? Authorizing Provider   ibuprofen-famotidine (Duexis) 800-26.6 mg tab Duexis 800 mg-26.6 mg tablet   Yes Provider, Historical   albuterol (PROVENTIL VENTOLIN) 2.5 mg /3 mL (0.083 %) nebu 3 mL by Nebulization route every four (4) hours as needed for Wheezing or Shortness of Breath.  11/9/21  Yes Roya Crockett MD   albuterol (Proventil HFA) 90 mcg/actuation inhaler Take 2 Puffs by inhalation every four (4) hours as needed for Wheezing or Shortness of Breath (and cough). 11/9/21  Yes Debbie Mccray MD   montelukast (SINGULAIR) 10 mg tablet Take 1 Tablet by mouth daily. 11/9/21  Yes Debbie Mccray MD   DULoxetine (CYMBALTA) 30 mg capsule TAKE 1 CAPSULE BY MOUTH DAILY 11/9/21  Yes Tatianna Babin MD   hydroCHLOROthiazide (HYDRODIURIL) 25 mg tablet TAKE 1 TABLET BY MOUTH DAILY 11/9/21  Yes Tatianna Babin MD   atorvastatin (LIPITOR) 10 mg tablet Take 1 Tablet by mouth daily. 11/9/21  Yes Debbie Mccray MD   ergocalciferol (ERGOCALCIFEROL) 1,250 mcg (50,000 unit) capsule TAKE 1 CAPSULE BY MOUTH EVERY 7 DAYS 11/9/21  Yes Tatianna Babin MD   losartan (COZAAR) 100 mg tablet Take 1 Tablet by mouth daily. 11/9/21  Yes Debbie Mccray MD   fluticasone propionate (FLONASE) 50 mcg/actuation nasal spray SHAKE LIQUID AND USE 2 SPRAYS IN EACH NOSTRIL DAILY 11/9/21  Yes Debbie Mccray MD   topiramate (TOPAMAX) 50 mg tablet Take 1 Tablet by mouth two (2) times a day. 11/9/21  Yes Debbie Mccray MD   omeprazole (PRILOSEC) 40 mg capsule TAKE 1 CAPSULE BY MOUTH DAILY 10/5/21  Yes Tatianna Babin MD   fluticasone propion-salmeteroL (Advair Diskus) 250-50 mcg/dose diskus inhaler USE 1 INHALATION BY MOUTH EVERY 12 HOURS 9/28/21  Yes Tatianna Babin MD   cetirizine (ZYRTEC) 10 mg tablet TAKE 1 TABLET BY MOUTH DAILY 8/12/21  Yes Debbie Mccray MD   lidocaine (LIDODERM) 5 % Apply patch to the affected area for 12 hours a day and remove for 12 hours a day. 9/25/20  Yes Debbie Mccray MD   gabapentin (NEURONTIN) 600 mg tablet Take 600 mg by mouth four (4) times daily. Yes Provider, Historical   ocrelizumab (OCREVUS) 30 mg/mL soln injection by IntraVENous route.    Yes Provider, Historical   ibuprofen (MOTRIN) 800 mg tablet TK 1 T PO TID  Patient not taking: Reported on 11/9/2021 11/22/19   Provider, Historical   butalbital-acetaminophen-caffeine (FIORICET, ESGIC) -40 mg per tablet Take 1 Tab by mouth every six (6) hours as needed for Pain for up to 30 doses.  Max 6 /24 hours  Patient not taking: Reported on 2021   Sherwin Fontana MD     Allergies   Allergen Reactions   Arland Habermann [Natalizumab] Anaphylaxis       Patient Active Problem List   Diagnosis Code    Multiple sclerosis ([facility] G35    Heavy menstrual period N92.0    Anemia D64.9    Obesity, Class III, BMI 40-49.9 (morbid obesity) ([facility] E66.01    Mild intermittent asthma without complication V39.84    Vitamin D deficiency E55.9    Iron deficiency anemia due to chronic blood loss D50.0    Migraine without aura and without status migrainosus, not intractable G43.009    H/O tubal ligation Z98.51    Gastroesophageal reflux disease without esophagitis K21.9    Menorrhagia with regular cycle N92.0    BMI 45.0-49.9, adult ([facility] Z68.42    S/P endometrial ablation Z98.890    Essential hypertension I10    Acute relapsing multiple sclerosis ([facility] [address]    Encounter for long-term (current) use of medications Z79.899    Depression, major, recurrent, mild (HCC) F33.0     Past Medical History:   Diagnosis Date    BMI 45.0-49.9, adult ([facility] 2018    Essential hypertension 2018    Gastroesophageal reflux disease without esophagitis 3/16/2017    H/O tubal ligation 3/16/2017    Iron deficiency anemia due to chronic blood loss 3/16/2017    Menorrhagia with regular cycle 4/3/2017    Migraine without aura and without status migrainosus, not intractable 3/16/2017    Mild intermittent asthma without complication 4368    MS (multiple sclerosis) ([facility] 10/08    using clinical trial medications    Obesity, Class III, BMI 40-49.9 (morbid obesity) ([facility] 12/3/2015    S/P endometrial ablation 2018    Vitamin D deficiency 3/16/2017     Past Surgical History:   Procedure Laterality Date    HX  SECTION      x3; , , &     AK LAP,TUBAL CAUTERY       Objective:   Vital Signs: (As obtained by patient/caregiver at home)  There were no vitals taken for this visit. Constitutional: [x] Appears well-developed and well-nourished [x] No apparent distress        Mental status: [x] Alert and awake  [x] Oriented to person/place/time [x] Able to follow commands      Eyes:   EOM    [x]  Normal      Sclera  [x]  Normal              Discharge [x]  None visible       HENT: [x] Normocephalic, atraumatic    [] Mouth/Throat: Mucous membranes are moist    External Ears [x] Normal      Neck: [x] No visualized mass     Pulmonary/Chest: [x] Respiratory effort normal   [x] No visualized signs of difficulty breathing or respiratory distress           Musculoskeletal:   [x] Normal gait with no signs of ataxia         [x] Normal range of motion of neck         Neurological:        [x] No Facial Asymmetry (Cranial nerve 7 motor function) (limited exam due to video visit)          [x] No gaze palsy              Skin:        [x] No significant exanthematous lesions or discoloration noted on facial skin                  Psychiatric:       [x] Normal Affect [] Abnormal -        [x] No Hallucinations      We discussed the expected course, resolution and complications of the diagnosis(es) in detail. Medication risks, benefits, costs, interactions, and alternatives were discussed as indicated. I advised her to contact the office if her condition worsens, changes or fails to improve as anticipated. She expressed understanding with the diagnosis(es) and plan. Page Teague is a 40 y.o. female being evaluated by a video visit encounter for concerns as above. A caregiver was present when appropriate. Due to this being a TeleHealth encounter (During [facility]- public health emergency), evaluation of the following organ systems was limited: Vitals/Constitutional/EENT/Resp/CV/GI//MS/Neuro/Skin/Heme-Lymph-Imm.   Pursuant to the emergency declaration under the [facility] waiver authority and the [employer] Supplemental Appropriations Act, this Virtual  Visit was conducted, with patient's (and/or legal guardian's) consent, to reduce the patient's risk of exposure to COVID-19 and provide necessary medical care. Services were provided through a video synchronous discussion virtually to substitute for in-person clinic visit. Patient and provider were located at their individual homes.         Tsering Persaud MD

## 2021-11-30 ENCOUNTER — TRANSCRIBE ORDER (OUTPATIENT)
Dept: SCHEDULING | Age: 46
End: 2021-11-30

## 2021-11-30 DIAGNOSIS — Z12.31 VISIT FOR SCREENING MAMMOGRAM: Primary | ICD-10-CM

## 2021-12-07 RX ORDER — CETIRIZINE HCL 10 MG
TABLET ORAL
Qty: 90 TABLET | Refills: 0 | Status: SHIPPED | OUTPATIENT
Start: 2021-12-07 | End: 2022-03-28

## 2021-12-11 DIAGNOSIS — M48.061 FORAMINAL STENOSIS OF LUMBAR REGION: ICD-10-CM

## 2021-12-11 DIAGNOSIS — G89.29 CHRONIC BILATERAL LOW BACK PAIN WITH SCIATICA, SCIATICA LATERALITY UNSPECIFIED: ICD-10-CM

## 2021-12-11 DIAGNOSIS — M54.40 CHRONIC BILATERAL LOW BACK PAIN WITH SCIATICA, SCIATICA LATERALITY UNSPECIFIED: ICD-10-CM

## 2021-12-15 RX ORDER — DULOXETIN HYDROCHLORIDE 30 MG/1
CAPSULE, DELAYED RELEASE ORAL
Qty: 90 CAPSULE | Refills: 1 | Status: SHIPPED | OUTPATIENT
Start: 2021-12-15 | End: 2021-12-27 | Stop reason: SDUPTHER

## 2021-12-27 RX ORDER — DULOXETIN HYDROCHLORIDE 60 MG/1
CAPSULE, DELAYED RELEASE ORAL
Qty: 90 CAPSULE | Refills: 1 | Status: SHIPPED | OUTPATIENT
Start: 2021-12-27 | End: 2022-03-30

## 2021-12-28 ENCOUNTER — VIRTUAL VISIT (OUTPATIENT)
Dept: FAMILY MEDICINE CLINIC | Age: 46
End: 2021-12-28
Payer: COMMERCIAL

## 2021-12-28 DIAGNOSIS — F41.8 DEPRESSION WITH ANXIETY: Primary | ICD-10-CM

## 2021-12-28 PROCEDURE — 99213 OFFICE O/P EST LOW 20 MIN: CPT | Performed by: FAMILY MEDICINE

## 2021-12-28 NOTE — PROGRESS NOTES
Consent: Dawood Escobedo, who was seen by synchronous (real-time) audio-video technology, and/or her healthcare decision maker, is aware that this patient-initiated, Telehealth encounter on 12/28/2021 is a billable service, with coverage as determined by her insurance carrier. She is aware that she may receive a bill and has provided verbal consent to proceed: Yes. Dawood Escobedo is a 40 y.o. female    Vaccinated for [place]     but  in [facility]     has a past medical history of BMI 45.0-49.9, adult ([facility] (2/14/2018), Essential hypertension (4/17/2018), Gastroesophageal reflux disease without esophagitis (3/16/2017), H/O tubal ligation (3/16/2017), Iron deficiency anemia due to chronic blood loss (3/16/2017), Menorrhagia with regular cycle (4/3/2017), Migraine without aura and without status migrainosus, not intractable (3/16/2017), Mild intermittent asthma without complication (2/24/4675), MS (multiple sclerosis) ([facility] (10/08), Obesity, Class III, BMI 40-49.9 (morbid obesity) ([facility] (12/3/2015), S/P endometrial ablation (4/16/2018), and Vitamin D deficiency (3/16/2017). depression and anxiety  Over the holidays was feeling down. She increased her cymbalta to 60mg. Then ran out and insurance wouldn't approve more. She was out for a couple of weeks, started having crying spells, depressed. Finally got her meds again for past week at 30mg and is doing better. However she would like to increase dose to 60mg. Denies SI or HI. Cymbalta helps with pain. Also helped with her mood. She feels happy. Increase dose to 60mg. Asthma: mild-moderate persistent w/o complication  Advair, albuterol    HTN: 131/92 w home BP cuff today  HCTZ, losartan  Still haven't done labs. We discussed needing labs in between. But next visit is annual exam.     HLD: LDL worsen  LIpitor 10mg      Weight loss counseling:   She's gained more weight. She's been on topamax for 6 months.  Does help with appetite, eats only once but still gaining weight. Refer to gastric bypass. She never did call. Again reminded need to see us 3X for letter or support. Weight loss counseling.               Discussed calories count, goal for 1400cal/day.  Exercise. Choice of food.               last visit we increased topamax 50mg bid - does help, appetite lessen               We ordered EKG in anticipation of phentermine - she didn't do them yet.               she'll let us know when EKG done so we can start phentermine     GERD: prilosec 40mg    GARRY on CPAP    Chronic low back pain seeing pain specialist, had steroid injection. S/p nerve ablation on one side.  So still have burning on the other side. Gabapentin 300mg written by Ok Castellon. She up it herself to BID.   Saw Dr. Thomas Laughter recently, 7/20/2020  Orchato Lingsukhwindersavanahel to pain specialist had a steroid injection, nerve block just 2 days ago.               Gabapentin increased to 600mg 4X daily.             She's been in PT for 4 weeks.      Neuro at [facility]  Migraine HA  MS managed               Gabapentin filled by her Neurologist     Saw OBGYN: had papsmear there 05/2019  Mammogram done 06/2019      Reviewed: active problem list, medication list, allergies, notes from last encounter, lab results    A comprehensive review of systems was negative except for that written in the HPI. Assessment & Plan:   Diagnoses and all orders for this visit:    1. Depression with anxiety      Follow-up and Dispositions    · Return in about 10 weeks (around 3/8/2022) for annual exam.       712  Subjective:   Sigrid Harris is a 40 y.o. female who was seen for Medication Evaluation      Prior to Admission medications    Medication Sig Start Date End Date Taking?  Authorizing Provider   DULoxetine (CYMBALTA) 60 mg capsule TAKE 1 CAPSULE BY MOUTH DAILY 12/27/21  Yes Madison Babin MD   cetirizine (ZYRTEC) 10 mg tablet TAKE 1 TABLET BY MOUTH DAILY 12/7/21  Yes Rishi Caputo MD ibuprofen-famotidine (Duexis) 800-26.6 mg tab Duexis 800 mg-26.6 mg tablet   Yes Provider, Historical   albuterol (PROVENTIL VENTOLIN) 2.5 mg /3 mL (0.083 %) nebu 3 mL by Nebulization route every four (4) hours as needed for Wheezing or Shortness of Breath. 11/9/21  Yes Bibiana Triplett MD   albuterol (Proventil HFA) 90 mcg/actuation inhaler Take 2 Puffs by inhalation every four (4) hours as needed for Wheezing or Shortness of Breath (and cough). 11/9/21  Yes Bibiana Triplett MD   montelukast (SINGULAIR) 10 mg tablet Take 1 Tablet by mouth daily. 11/9/21  Yes Bibiana Triplett MD   hydroCHLOROthiazide (HYDRODIURIL) 25 mg tablet TAKE 1 TABLET BY MOUTH DAILY 11/9/21  Yes Salvatore Babin MD   atorvastatin (LIPITOR) 10 mg tablet Take 1 Tablet by mouth daily. 11/9/21  Yes Bibiana Triplett MD   ergocalciferol (ERGOCALCIFEROL) 1,250 mcg (50,000 unit) capsule TAKE 1 CAPSULE BY MOUTH EVERY 7 DAYS 11/9/21  Yes Salvatore Babin MD   losartan (COZAAR) 100 mg tablet Take 1 Tablet by mouth daily. 11/9/21  Yes Bibiana Triplett MD   fluticasone propionate (FLONASE) 50 mcg/actuation nasal spray SHAKE LIQUID AND USE 2 SPRAYS IN EACH NOSTRIL DAILY 11/9/21  Yes Bibiana Triplett MD   topiramate (TOPAMAX) 50 mg tablet Take 1 Tablet by mouth two (2) times a day. 11/9/21  Yes Bibiana Triplett MD   omeprazole (PRILOSEC) 40 mg capsule TAKE 1 CAPSULE BY MOUTH DAILY 10/5/21  Yes Salvatore Babin MD   fluticasone propion-salmeteroL (Advair Diskus) 250-50 mcg/dose diskus inhaler USE 1 INHALATION BY MOUTH EVERY 12 HOURS 9/28/21  Yes Salvatore Babin MD   lidocaine (LIDODERM) 5 % Apply patch to the affected area for 12 hours a day and remove for 12 hours a day. 9/25/20  Yes Bibiana Triplett MD   gabapentin (NEURONTIN) 600 mg tablet Take 600 mg by mouth three (3) times daily. Yes Provider, Historical   ocrelizumab (OCREVUS) 30 mg/mL soln injection by IntraVENous route.    Yes Provider, Historical   ibuprofen (MOTRIN) 800 mg tablet TK 1 T PO TID  Patient not taking: Reported on 11/9/2021 11/22/19   Provider, Historical   butalbital-acetaminophen-caffeine (FIORICET, ESGIC) -40 mg per tablet Take 1 Tab by mouth every six (6) hours as needed for Pain for up to 30 doses.  Max 6 /24 hours  Patient not taking: Reported on 7/9/2021 6/23/18   Emilia Jimenez MD     Allergies   Allergen Reactions   Armani Cori [Natalizumab] Anaphylaxis       Patient Active Problem List   Diagnosis Code    Multiple sclerosis ([facility] G35    Heavy menstrual period N92.0    Anemia D64.9    Obesity, Class III, BMI 40-49.9 (morbid obesity) ([facility] E66.01    Mild intermittent asthma without complication J16.05    Vitamin D deficiency E55.9    Iron deficiency anemia due to chronic blood loss D50.0    Migraine without aura and without status migrainosus, not intractable G43.009    H/O tubal ligation Z98.51    Gastroesophageal reflux disease without esophagitis K21.9    Menorrhagia with regular cycle N92.0    BMI 45.0-49.9, adult ([facility] Z68.42    S/P endometrial ablation Z98.890    Essential hypertension I10    Acute relapsing multiple sclerosis ([facility] G35    Encounter for long-term (current) use of medications Z79.899    Depression, major, recurrent, mild (HCC) F33.0     Past Medical History:   Diagnosis Date    BMI 45.0-49.9, adult ([facility] 2/14/2018    Essential hypertension 4/17/2018    Gastroesophageal reflux disease without esophagitis 3/16/2017    H/O tubal ligation 3/16/2017    Iron deficiency anemia due to chronic blood loss 3/16/2017    Menorrhagia with regular cycle 4/3/2017    Migraine without aura and without status migrainosus, not intractable 3/16/2017    Mild intermittent asthma without complication 2/36/0084    MS (multiple sclerosis) ([address] 10/08    using clinical trial medications    Obesity, Class III, BMI 40-49.9 (morbid obesity) ([address] 12/3/2015    S/P endometrial ablation 4/16/2018    Vitamin D deficiency 3/16/2017     Past Surgical History:   Procedure Laterality Date    HX  SECTION      x3; , , & 2005    OR LAP,TUBAL CAUTERY  2005     Objective:   Vital Signs: (As obtained by patient/caregiver at home)  There were no vitals taken for this visit. Constitutional: [x] Appears well-developed and well-nourished [x] No apparent distress        Mental status: [x] Alert and awake  [x] Oriented to person/place/time [x] Able to follow commands      Eyes:   EOM    [x]  Normal      Sclera  [x]  Normal              Discharge [x]  None visible       HENT: [x] Normocephalic, atraumatic    [] Mouth/Throat: Mucous membranes are moist    External Ears [x] Normal      Neck: [x] No visualized mass     Pulmonary/Chest: [x] Respiratory effort normal   [x] No visualized signs of difficulty breathing or respiratory distress           Musculoskeletal:   [x] Normal gait with no signs of ataxia         [x] Normal range of motion of neck         Neurological:        [x] No Facial Asymmetry (Cranial nerve 7 motor function) (limited exam due to video visit)          [x] No gaze palsy              Skin:        [x] No significant exanthematous lesions or discoloration noted on facial skin                  Psychiatric:       [x] Normal Affect [] Abnormal -        [x] No Hallucinations      We discussed the expected course, resolution and complications of the diagnosis(es) in detail. Medication risks, benefits, costs, interactions, and alternatives were discussed as indicated. I advised her to contact the office if her condition worsens, changes or fails to improve as anticipated. She expressed understanding with the diagnosis(es) and plan. Rad Davidson is a 40 y.o. female being evaluated by a video visit encounter for concerns as above. A caregiver was present when appropriate.  Due to this being a TeleHealth encounter (During [facility] public health emergency), evaluation of the following organ systems was limited: Vitals/Constitutional/EENT/Resp/CV/GI//MS/Neuro/Skin/Heme-Lymph-Imm. Pursuant to the emergency declaration under the [address] [place] [address], [facility] waiver authority and the [employer] Act, this Virtual  Visit was conducted, with patient's (and/or legal guardian's) consent, to reduce the patient's risk of exposure to COVID-19 and provide necessary medical care. Services were provided through a video synchronous discussion virtually to substitute for in-person clinic visit. Patient and provider were located at their individual homes.         Ten Velázquez MD

## 2021-12-28 NOTE — PROGRESS NOTES
Chief Complaint   Patient presents with    Medication Evaluation       1. Have you been to the ER, urgent care clinic since your last visit? Hospitalized since your last visit? No     2. Have you seen or consulted any other health care providers outside of the [address] [place] [address] since your last visit? Include any pap smears or colon screening.  Yes

## 2021-12-29 ENCOUNTER — HOSPITAL ENCOUNTER (OUTPATIENT)
Dept: MAMMOGRAPHY | Age: 46
Discharge: HOME | End: 2021-12-29
Attending: FAMILY MEDICINE
Payer: COMMERCIAL

## 2021-12-29 DIAGNOSIS — Z12.31 VISIT FOR SCREENING MAMMOGRAM: ICD-10-CM

## 2021-12-29 PROCEDURE — 77067 SCR MAMMO BI INCL CAD: CPT

## 2021-12-29 PROCEDURE — 77063 BREAST TOMOSYNTHESIS BI: CPT

## 2021-12-30 DIAGNOSIS — K21.9 GASTROESOPHAGEAL REFLUX DISEASE WITHOUT ESOPHAGITIS: ICD-10-CM

## 2021-12-30 RX ORDER — OMEPRAZOLE 40 MG/1
CAPSULE, DELAYED RELEASE ORAL
Qty: 90 CAPSULE | Refills: 0 | Status: SHIPPED | OUTPATIENT
Start: 2021-12-30 | End: 2022-08-26

## 2021-12-31 DIAGNOSIS — I10 ESSENTIAL HYPERTENSION: ICD-10-CM

## 2021-12-31 RX ORDER — HYDROCHLOROTHIAZIDE 25 MG/1
TABLET ORAL
Qty: 90 TABLET | Refills: 1 | Status: SHIPPED | OUTPATIENT
Start: 2021-12-31 | End: 2022-06-29

## 2022-01-05 DIAGNOSIS — I10 ESSENTIAL HYPERTENSION: ICD-10-CM

## 2022-01-05 RX ORDER — LOSARTAN POTASSIUM 100 MG/1
100 TABLET ORAL DAILY
Qty: 90 TABLET | Refills: 1 | Status: SHIPPED | OUTPATIENT
Start: 2022-01-05 | End: 2022-06-29

## 2022-01-12 DIAGNOSIS — G89.29 CHRONIC BILATERAL LOW BACK PAIN WITH SCIATICA, SCIATICA LATERALITY UNSPECIFIED: ICD-10-CM

## 2022-01-12 DIAGNOSIS — M54.40 CHRONIC BILATERAL LOW BACK PAIN WITH SCIATICA, SCIATICA LATERALITY UNSPECIFIED: ICD-10-CM

## 2022-01-12 DIAGNOSIS — M48.061 FORAMINAL STENOSIS OF LUMBAR REGION: ICD-10-CM

## 2022-01-12 RX ORDER — DULOXETIN HYDROCHLORIDE 60 MG/1
CAPSULE, DELAYED RELEASE ORAL
Qty: 90 CAPSULE | Refills: 1 | Status: SHIPPED | OUTPATIENT
Start: 2022-01-12 | End: 2022-03-08 | Stop reason: SDUPTHER

## 2022-03-08 ENCOUNTER — DOCUMENTATION ONLY (OUTPATIENT)
Dept: FAMILY MEDICINE CLINIC | Age: 47
End: 2022-03-08

## 2022-03-08 ENCOUNTER — VIRTUAL VISIT (OUTPATIENT)
Dept: FAMILY MEDICINE CLINIC | Age: 47
End: 2022-03-08
Payer: COMMERCIAL

## 2022-03-08 DIAGNOSIS — E66.01 MORBID OBESITY (HCC): ICD-10-CM

## 2022-03-08 DIAGNOSIS — Z79.899 ENCOUNTER FOR LONG-TERM (CURRENT) USE OF MEDICATIONS: ICD-10-CM

## 2022-03-08 DIAGNOSIS — E66.01 MORBID OBESITY WITH BMI OF 50.0-59.9, ADULT (HCC): ICD-10-CM

## 2022-03-08 DIAGNOSIS — I10 ESSENTIAL HYPERTENSION: ICD-10-CM

## 2022-03-08 DIAGNOSIS — Z11.59 NEED FOR HEPATITIS C SCREENING TEST: ICD-10-CM

## 2022-03-08 DIAGNOSIS — E55.9 VITAMIN D DEFICIENCY: ICD-10-CM

## 2022-03-08 DIAGNOSIS — Z00.00 ANNUAL PHYSICAL EXAM: Primary | ICD-10-CM

## 2022-03-08 DIAGNOSIS — E78.00 PURE HYPERCHOLESTEROLEMIA: ICD-10-CM

## 2022-03-08 DIAGNOSIS — F41.8 DEPRESSION WITH ANXIETY: ICD-10-CM

## 2022-03-08 DIAGNOSIS — Z13.1 SCREENING FOR DIABETES MELLITUS: ICD-10-CM

## 2022-03-08 PROCEDURE — 99396 PREV VISIT EST AGE 40-64: CPT | Performed by: FAMILY MEDICINE

## 2022-03-08 RX ORDER — TOPIRAMATE 50 MG/1
50 TABLET, FILM COATED ORAL 2 TIMES DAILY
Qty: 180 TABLET | Refills: 1 | Status: SHIPPED | OUTPATIENT
Start: 2022-03-08 | End: 2022-09-28 | Stop reason: SDUPTHER

## 2022-03-08 RX ORDER — ATORVASTATIN CALCIUM 10 MG/1
10 TABLET, FILM COATED ORAL DAILY
Qty: 90 TABLET | Refills: 1 | Status: SHIPPED | OUTPATIENT
Start: 2022-03-08 | End: 2022-07-25 | Stop reason: SDUPTHER

## 2022-03-08 RX ORDER — ERGOCALCIFEROL 1.25 MG/1
CAPSULE ORAL
Qty: 12 CAPSULE | Refills: 1 | Status: SHIPPED | OUTPATIENT
Start: 2022-03-08 | End: 2022-04-20

## 2022-03-08 NOTE — PROGRESS NOTES
Spoke with patient. Offered to schedule 4 mo check. She said that she would call back. Explained where to go to get labs drawn.

## 2022-03-08 NOTE — PROGRESS NOTES
Consent: Hue Alan, who was seen by synchronous (real-time) audio-video technology, and/or her healthcare decision maker, is aware that this patient-initiated, Telehealth encounter on 3/8/2022 is a billable service, with coverage as determined by her insurance carrier. She is aware that she may receive a bill and has provided verbal consent to proceed: Yes. Hue Alan is a 56 y.o. female, annual exam    She forgot to do labs again, this time it's been 13 months now. Discussed need to do it today, unable to more refill next time    Vaccinated for COVID      S/p tubal ligation     has a past medical history of BMI 45.0-49.9, adult ([facility] [address] (2/14/2018), Essential hypertension (4/17/2018), Gastroesophageal reflux disease without esophagitis (3/16/2017), H/O tubal ligation (3/16/2017), Iron deficiency anemia due to chronic blood loss (3/16/2017), Menorrhagia with regular cycle (4/3/2017), Migraine without aura and without status migrainosus, not intractable (3/16/2017), Mild intermittent asthma without complication (1/53/6343), MS (multiple sclerosis) ([facility] [address] (10/08), Obesity, Class III, BMI 40-49.9 (morbid obesity) ([facility] (12/3/2015), S/P endometrial ablation (4/16/2018), and Vitamin D deficiency (3/16/2017). depression and anxiety  Mood has been good  Denies SI or HI. Cymbalta helps with pain. Also helped with her mood. She feels happy. Continue cymbalta 60mg. Asthma: mild-moderate persistent w/o complication  Advair, albuterol    HTN: 131/92 w home BP cuff today  HCTZ, losartan  Still haven't done labs. We discussed needing labs in between. But next visit is annual exam.     HLD: LDL worsen  LIpitor 10mg      Weight loss counseling:   She's gained more weight. She's been on topamax for 6 months. Does help with appetite, eats only once but still gaining weight. Refer to gastric bypass. She never did call.  Again reminded need to see us 3X for letter or support. Weight loss counseling.               Discussed calories count, goal for 1400cal/day.  Exercise. Choice of food.               last visit we increased topamax 50mg bid - does help, appetite lessen               We ordered EKG in anticipation of phentermine - she didn't do them yet.               she'll let us know when EKG done so we can start phentermine      in [place], need hep c test    GERD: prilosec 40mg    GARRY on CPAP    Chronic low back pain seeing pain specialist, had steroid injection. S/p nerve ablation on one side.  So still have burning on the other side. Gabapentin 300mg written by Sarah Chavez. She up it herself to BID.   Saw Dr. Sony Webb recently, 7/20/2020  Ariana Abbott to pain specialist had a steroid injection, nerve block just 2 days ago.               Gabapentin increased to 600mg 4X daily.             She's been in PT for 4 weeks.      Neuro at [facility]  Migraine HA  MS managed               Gabapentin filled by her Neurologist     Saw OBGYN: had papsmear there 05/2019  Mammogram done 06/2019    Colonoscopy 12/2021, request medical record      Reviewed: active problem list, medication list, allergies, notes from last encounter, lab results    A comprehensive review of systems was negative except for that written in the HPI. Assessment & Plan:   Diagnoses and all orders for this visit:    1. Annual physical exam  -     LIPID PANEL; Future  -     HEMOGLOBIN A1C WITH EAG; Future  -     CBC W/O DIFF; Future  -     METABOLIC PANEL, COMPREHENSIVE; Future  -     TSH 3RD GENERATION; Future  -     HEPATITIS C AB; Future    2. Depression with anxiety  -     METABOLIC PANEL, COMPREHENSIVE; Future  -     TSH 3RD GENERATION; Future    3. Essential hypertension  -     METABOLIC PANEL, COMPREHENSIVE; Future  -     TSH 3RD GENERATION; Future    4. Pure hypercholesterolemia  -     LIPID PANEL; Future  -     METABOLIC PANEL, COMPREHENSIVE; Future  -     TSH 3RD GENERATION;  Future  - atorvastatin (LIPITOR) 10 mg tablet; Take 1 Tablet by mouth daily. 5. Morbid obesity with BMI of 50.0-59.9, adult ([address]    6. Morbid obesity (HCC)  -     topiramate (TOPAMAX) 50 mg tablet; Take 1 Tablet by mouth two (2) times a day. 7. Vitamin D deficiency  -     ergocalciferol (ERGOCALCIFEROL) 1,250 mcg (50,000 unit) capsule; TAKE 1 CAPSULE BY MOUTH EVERY 7 DAYS    8. Encounter for long-term (current) use of medications  -     LIPID PANEL; Future  -     HEMOGLOBIN A1C WITH EAG; Future  -     CBC W/O DIFF; Future  -     METABOLIC PANEL, COMPREHENSIVE; Future  -     TSH 3RD GENERATION; Future  -     HEPATITIS C AB; Future    9. Screening for diabetes mellitus  -     HEMOGLOBIN A1C WITH EAG; Future    10. Need for hepatitis C screening test  -     HEPATITIS C AB; Future      Follow-up and Dispositions    · Return in about 4 months (around 7/8/2022) for HTN, HLD, psych, meds, labs. 712  Subjective:   Shirley Morris is a 56 y.o. female who was seen for Follow-up      Prior to Admission medications    Medication Sig Start Date End Date Taking? Authorizing Provider   atorvastatin (LIPITOR) 10 mg tablet Take 1 Tablet by mouth daily. 3/8/22  Yes Gerlean Fothergill, MD   ergocalciferol (ERGOCALCIFEROL) 1,250 mcg (50,000 unit) capsule TAKE 1 CAPSULE BY MOUTH EVERY 7 DAYS 3/8/22  Yes Gerlean Fothergill, MD   topiramate (TOPAMAX) 50 mg tablet Take 1 Tablet by mouth two (2) times a day.  3/8/22  Yes Gerlean Fothergill, MD   losartan (COZAAR) 100 mg tablet TAKE 1 TABLET BY MOUTH DAILY 1/5/22  Yes Gray Babin MD   hydroCHLOROthiazide (HYDRODIURIL) 25 mg tablet TAKE 1 TABLET BY MOUTH DAILY 12/31/21  Yes Gray Babin MD   omeprazole (PRILOSEC) 40 mg capsule TAKE 1 CAPSULE BY MOUTH DAILY 12/30/21  Yes Gerlean Fothergill, MD   DULoxetine (CYMBALTA) 60 mg capsule TAKE 1 CAPSULE BY MOUTH DAILY 12/27/21  Yes Gerlean Fothergill, MD   cetirizine (ZYRTEC) 10 mg tablet TAKE 1 TABLET BY MOUTH DAILY 12/7/21  Yes Gerlean Fothergill, MD ibuprofen-famotidine (Duexis) 800-26.6 mg tab Duexis 800 mg-26.6 mg tablet   Yes Provider, Historical   albuterol (PROVENTIL VENTOLIN) 2.5 mg /3 mL (0.083 %) nebu 3 mL by Nebulization route every four (4) hours as needed for Wheezing or Shortness of Breath. 11/9/21  Yes Shant Cope MD   albuterol (Proventil HFA) 90 mcg/actuation inhaler Take 2 Puffs by inhalation every four (4) hours as needed for Wheezing or Shortness of Breath (and cough). 11/9/21  Yes Shant Cope MD   montelukast (SINGULAIR) 10 mg tablet Take 1 Tablet by mouth daily. 11/9/21  Yes Silvio Babin MD   fluticasone propionate (FLONASE) 50 mcg/actuation nasal spray SHAKE LIQUID AND USE 2 SPRAYS IN EACH NOSTRIL DAILY 11/9/21  Yes Silvio Babin MD   fluticasone propion-salmeteroL (Advair Diskus) 250-50 mcg/dose diskus inhaler USE 1 INHALATION BY MOUTH EVERY 12 HOURS 9/28/21  Yes Silvio Babin MD   lidocaine (LIDODERM) 5 % Apply patch to the affected area for 12 hours a day and remove for 12 hours a day. 9/25/20  Yes Shant Cope MD   gabapentin (NEURONTIN) 600 mg tablet Take 600 mg by mouth three (3) times daily. Yes Provider, Historical   ocrelizumab (OCREVUS) 30 mg/mL soln injection by IntraVENous route. Yes Provider, Historical   ibuprofen (MOTRIN) 800 mg tablet TK 1 T PO TID  Patient not taking: Reported on 11/9/2021 11/22/19   Provider, Historical   butalbital-acetaminophen-caffeine (FIORICET, ESGIC) -40 mg per tablet Take 1 Tab by mouth every six (6) hours as needed for Pain for up to 30 doses.  Max 6 /24 hours  Patient not taking: Reported on 7/9/2021 6/23/18   Dimas Bucio MD     Allergies   Allergen Reactions   Alvira Pacini [Natalizumab] Anaphylaxis       Patient Active Problem List   Diagnosis Code    Multiple sclerosis ([facility] [address] G35    Heavy menstrual period N92.0    Anemia D64.9    Obesity, Class III, BMI 40-49.9 (morbid obesity) ([facility]) E66.01    Mild intermittent asthma without complication S76.05    Vitamin D deficiency E55.9    Iron deficiency anemia due to chronic blood loss D50.0    Migraine without aura and without status migrainosus, not intractable G43.009    H/O tubal ligation Z98.51    Gastroesophageal reflux disease without esophagitis K21.9    Menorrhagia with regular cycle N92.0    BMI 45.0-49.9, adult (HCC) Z68.42    S/P endometrial ablation Z98.890    Essential hypertension I10    Acute relapsing multiple sclerosis ([facility] [address]    Encounter for long-term (current) use of medications Z79.899    Depression, major, recurrent, mild (HCC) F33.0     Past Medical History:   Diagnosis Date    BMI 45.0-49.9, adult ([facility] [address] 2018    Essential hypertension 2018    Gastroesophageal reflux disease without esophagitis 3/16/2017    H/O tubal ligation 3/16/2017    Iron deficiency anemia due to chronic blood loss 3/16/2017    Menorrhagia with regular cycle 4/3/2017    Migraine without aura and without status migrainosus, not intractable 3/16/2017    Mild intermittent asthma without complication     MS (multiple sclerosis) ([facility] [address] 10/08    using clinical trial medications    Obesity, Class III, BMI 40-49.9 (morbid obesity) ([facility] [address] 12/3/2015    S/P endometrial ablation 2018    Vitamin D deficiency 3/16/2017     Past Surgical History:   Procedure Laterality Date    HX  SECTION      x3; , , &     ID LAP,TUBAL CAUTERY       Objective:   Vital Signs: (As obtained by patient/caregiver at home)  There were no vitals taken for this visit.      Constitutional: [x] Appears well-developed and well-nourished [x] No apparent distress        Mental status: [x] Alert and awake  [x] Oriented to person/place/time [x] Able to follow commands      Eyes:   EOM    [x]  Normal      Sclera  [x]  Normal              Discharge [x]  None visible       HENT: [x] Normocephalic, atraumatic    [] Mouth/Throat: Mucous membranes are moist    External Ears [x] Normal      Neck: [x] No visualized mass     Pulmonary/Chest: [x] Respiratory effort normal   [x] No visualized signs of difficulty breathing or respiratory distress           Musculoskeletal:   [x] Normal gait with no signs of ataxia         [x] Normal range of motion of neck         Neurological:        [x] No Facial Asymmetry (Cranial nerve 7 motor function) (limited exam due to video visit)          [x] No gaze palsy              Skin:        [x] No significant exanthematous lesions or discoloration noted on facial skin                  Psychiatric:       [x] Normal Affect [] Abnormal -        [x] No Hallucinations      We discussed the expected course, resolution and complications of the diagnosis(es) in detail. Medication risks, benefits, costs, interactions, and alternatives were discussed as indicated. I advised her to contact the office if her condition worsens, changes or fails to improve as anticipated. She expressed understanding with the diagnosis(es) and plan. Christian Suarez is a 56 y.o. female being evaluated by a video visit encounter for concerns as above. A caregiver was present when appropriate. Due to this being a TeleHealth encounter (During [facility]- public health emergency), evaluation of the following organ systems was limited: Vitals/Constitutional/EENT/Resp/CV/GI//MS/Neuro/Skin/Heme-Lymph-Imm. Pursuant to the emergency declaration under the [facility], 1135 waiver authority and the [employer] and [employer] Act, this Virtual  Visit was conducted, with patient's (and/or legal guardian's) consent, to reduce the patient's risk of exposure to COVID-19 and provide necessary medical care. Services were provided through a video synchronous discussion virtually to substitute for in-person clinic visit. Patient and provider were located at their individual homes.         Nadia Lewis MD

## 2022-03-18 PROBLEM — Z98.51 H/O TUBAL LIGATION: Status: ACTIVE | Noted: 2017-03-16

## 2022-03-18 PROBLEM — N92.0 MENORRHAGIA WITH REGULAR CYCLE: Status: ACTIVE | Noted: 2017-04-03

## 2022-03-18 PROBLEM — D50.0 IRON DEFICIENCY ANEMIA DUE TO CHRONIC BLOOD LOSS: Status: ACTIVE | Noted: 2017-03-16

## 2022-03-19 PROBLEM — F33.0 DEPRESSION, MAJOR, RECURRENT, MILD (HCC): Status: ACTIVE | Noted: 2019-11-13

## 2022-03-19 PROBLEM — J45.20 MILD INTERMITTENT ASTHMA WITHOUT COMPLICATION: Status: ACTIVE | Noted: 2017-03-16

## 2022-03-19 PROBLEM — G35 ACUTE RELAPSING MULTIPLE SCLEROSIS (HCC): Status: ACTIVE | Noted: 2018-06-21

## 2022-03-19 PROBLEM — Z79.899 ENCOUNTER FOR LONG-TERM (CURRENT) USE OF MEDICATIONS: Status: ACTIVE | Noted: 2019-01-04

## 2022-03-19 PROBLEM — K21.9 GASTROESOPHAGEAL REFLUX DISEASE WITHOUT ESOPHAGITIS: Status: ACTIVE | Noted: 2017-03-16

## 2022-03-19 PROBLEM — I10 ESSENTIAL HYPERTENSION: Status: ACTIVE | Noted: 2018-04-17

## 2022-03-20 PROBLEM — Z98.890 S/P ENDOMETRIAL ABLATION: Status: ACTIVE | Noted: 2018-04-16

## 2022-03-20 PROBLEM — G43.009 MIGRAINE WITHOUT AURA AND WITHOUT STATUS MIGRAINOSUS, NOT INTRACTABLE: Status: ACTIVE | Noted: 2017-03-16

## 2022-03-20 PROBLEM — E55.9 VITAMIN D DEFICIENCY: Status: ACTIVE | Noted: 2017-03-16

## 2022-03-28 RX ORDER — CETIRIZINE HCL 10 MG
TABLET ORAL
Qty: 90 TABLET | Refills: 0 | Status: SHIPPED | OUTPATIENT
Start: 2022-03-28 | End: 2022-06-29

## 2022-03-29 DIAGNOSIS — G89.29 CHRONIC BILATERAL LOW BACK PAIN WITH SCIATICA, SCIATICA LATERALITY UNSPECIFIED: ICD-10-CM

## 2022-03-29 DIAGNOSIS — M48.061 FORAMINAL STENOSIS OF LUMBAR REGION: ICD-10-CM

## 2022-03-29 DIAGNOSIS — M54.40 CHRONIC BILATERAL LOW BACK PAIN WITH SCIATICA, SCIATICA LATERALITY UNSPECIFIED: ICD-10-CM

## 2022-03-29 DIAGNOSIS — J45.41 ASTHMA IN ADULT, MODERATE PERSISTENT, WITH ACUTE EXACERBATION: ICD-10-CM

## 2022-03-30 RX ORDER — DULOXETIN HYDROCHLORIDE 60 MG/1
CAPSULE, DELAYED RELEASE ORAL
Qty: 90 CAPSULE | Refills: 1 | Status: SHIPPED | OUTPATIENT
Start: 2022-03-30 | End: 2022-07-25 | Stop reason: SDUPTHER

## 2022-03-30 RX ORDER — ALBUTEROL SULFATE 90 UG/1
AEROSOL, METERED RESPIRATORY (INHALATION)
Qty: 18 G | Refills: 2 | Status: SHIPPED | OUTPATIENT
Start: 2022-03-30 | End: 2022-07-25 | Stop reason: SDUPTHER

## 2022-03-30 RX ORDER — ACETAMINOPHEN 325 MG/1
650 TABLET ORAL ONCE
Status: COMPLETED | OUTPATIENT
Start: 2022-03-31 | End: 2022-03-31

## 2022-03-30 RX ORDER — DIPHENHYDRAMINE HCL 25 MG
50 CAPSULE ORAL ONCE
Status: COMPLETED | OUTPATIENT
Start: 2022-03-31 | End: 2022-03-31

## 2022-03-31 ENCOUNTER — HOSPITAL ENCOUNTER (OUTPATIENT)
Dept: INFUSION THERAPY | Age: 47
Discharge: HOME | End: 2022-03-31
Payer: COMMERCIAL

## 2022-03-31 VITALS
DIASTOLIC BLOOD PRESSURE: 82 MMHG | TEMPERATURE: 97.1 F | HEIGHT: 66 IN | WEIGHT: 293 LBS | OXYGEN SATURATION: 97 % | BODY MASS INDEX: 47.09 KG/M2 | SYSTOLIC BLOOD PRESSURE: 134 MMHG | HEART RATE: 72 BPM | RESPIRATION RATE: 18 BRPM

## 2022-03-31 PROCEDURE — 96413 CHEMO IV INFUSION 1 HR: CPT

## 2022-03-31 PROCEDURE — 74011000250 HC RX REV CODE- 250

## 2022-03-31 PROCEDURE — 74011250636 HC RX REV CODE- 250/636: Performed by: PSYCHIATRY & NEUROLOGY

## 2022-03-31 PROCEDURE — 96415 CHEMO IV INFUSION ADDL HR: CPT

## 2022-03-31 PROCEDURE — 96375 TX/PRO/DX INJ NEW DRUG ADDON: CPT

## 2022-03-31 PROCEDURE — 74011250636 HC RX REV CODE- 250/636

## 2022-03-31 PROCEDURE — 74011250637 HC RX REV CODE- 250/637: Performed by: PSYCHIATRY & NEUROLOGY

## 2022-03-31 RX ORDER — SODIUM CHLORIDE 9 MG/ML
25 INJECTION, SOLUTION INTRAVENOUS AS NEEDED
Status: DISCONTINUED | OUTPATIENT
Start: 2022-03-31 | End: 2022-04-01 | Stop reason: HOSPADM

## 2022-03-31 RX ORDER — SODIUM CHLORIDE 0.9 % (FLUSH) 0.9 %
5-10 SYRINGE (ML) INJECTION AS NEEDED
Status: DISCONTINUED | OUTPATIENT
Start: 2022-03-31 | End: 2022-04-01 | Stop reason: HOSPADM

## 2022-03-31 RX ADMIN — OCRELIZUMAB 600 MG: 300 INJECTION INTRAVENOUS at 11:13

## 2022-03-31 RX ADMIN — DIPHENHYDRAMINE HYDROCHLORIDE 50 MG: 25 CAPSULE ORAL at 10:09

## 2022-03-31 RX ADMIN — METHYLPREDNISOLONE SODIUM SUCCINATE 125 MG: 125 INJECTION, POWDER, FOR SOLUTION INTRAMUSCULAR; INTRAVENOUS at 10:09

## 2022-03-31 RX ADMIN — SODIUM CHLORIDE 25 ML/HR: 9 INJECTION, SOLUTION INTRAVENOUS at 10:08

## 2022-03-31 RX ADMIN — ACETAMINOPHEN 650 MG: 325 TABLET ORAL at 10:09

## 2022-03-31 RX ADMIN — SODIUM CHLORIDE, PRESERVATIVE FREE 40 ML: 5 INJECTION INTRAVENOUS at 13:50

## 2022-03-31 NOTE — PROGRESS NOTES
Outpatient Infusion Center Progress Note    [facility]. Assessment completed. Patient denied having any symptoms of COVID-19, i.e. SOB, coughing, fever, or generally not feeling well. Also denies having been exposed to COVID-19 recently or having had any recent contact with family/friend that has a pending COVID test.     #24 PIV established R AC w/o issue, with positive blood return. Pt states she has not had any exacerbation of her MS since starting Ocrevus. Patient Vitals for the past 12 hrs:   Temp Pulse Resp BP SpO2   03/31/22 1340 -- 72 18 134/82 97 %   03/31/22 1240 -- 70 18 133/81 97 %   03/31/22 1145 97.1 °F (36.2 °C) 79 18 133/89 96 %   03/31/22 1130 96.8 °F (36 °C) 75 18 135/87 96 %   03/31/22 0952 97.7 °F (36.5 °C) 95 18 (!) 146/96 95 %       Medications:  Medications Administered     0.9% sodium chloride infusion     Admin Date  03/31/2022 Action  New Bag Dose  25 mL/hr Rate  25 mL/hr Route  IntraVENous Administered By  Michael Noble          acetaminophen (TYLENOL) tablet 650 mg     Admin Date  03/31/2022 Action  Given Dose  650 mg Route  Oral Administered By  Michael Noble          diphenhydrAMINE (BENADRYL) capsule 50 mg     Admin Date  03/31/2022 Action  Given Dose  50 mg Route  Oral Administered By  Michael Noble          methylPREDNISolone (PF) (Solu-MEDROL) injection 125 mg     Admin Date  03/31/2022 Action  Given Dose  125 mg Route  IntraVENous Administered By  Michael Noble          ocrelizumab (OCREVUS) 600 mg in 0.9% sodium chloride 500 mL infusion     Admin Date  03/31/2022 Action  New Bag Dose  600 mg Route  IntraVENous Administered By  Michael Noble          sodium chloride (NS) flush 5-10 mL     Admin Date  03/31/2022 Action  Given Dose  40 mL Route  IntraVENous Administered By  Michael Noble                1205 IV restarted L AC. R AC catheter kinked and unable to straighten or flush. Pt tolerated treatment well.  IV maintained positive blood return throughout treatment, flushed with positive blood return at conclusion, and DCd. D/c home in no distress. [facility]/29/22 at 0900.      Future Appointments   Date Time Provider Giovanna Luna   9/29/2022  9:00 AM Huycristopher

## 2022-04-19 DIAGNOSIS — J31.0 CHRONIC RHINITIS: ICD-10-CM

## 2022-04-19 RX ORDER — FLUTICASONE PROPIONATE 50 MCG
SPRAY, SUSPENSION (ML) NASAL
Qty: 48 G | Refills: 1 | Status: SHIPPED | OUTPATIENT
Start: 2022-04-19

## 2022-04-20 DIAGNOSIS — E55.9 VITAMIN D DEFICIENCY: ICD-10-CM

## 2022-04-20 RX ORDER — ERGOCALCIFEROL 1.25 MG/1
CAPSULE ORAL
Qty: 12 CAPSULE | Refills: 1 | Status: SHIPPED | OUTPATIENT
Start: 2022-04-20 | End: 2022-07-25 | Stop reason: SDUPTHER

## 2022-05-08 DIAGNOSIS — J45.41 ASTHMA IN ADULT, MODERATE PERSISTENT, WITH ACUTE EXACERBATION: ICD-10-CM

## 2022-05-09 RX ORDER — MONTELUKAST SODIUM 10 MG/1
10 TABLET ORAL DAILY
Qty: 30 TABLET | Refills: 5 | Status: SHIPPED | OUTPATIENT
Start: 2022-05-09 | End: 2022-07-25 | Stop reason: SDUPTHER

## 2022-05-11 ENCOUNTER — VIRTUAL VISIT (OUTPATIENT)
Dept: FAMILY MEDICINE CLINIC | Age: 47
End: 2022-05-11
Payer: COMMERCIAL

## 2022-05-11 DIAGNOSIS — G35 MULTIPLE SCLEROSIS (HCC): ICD-10-CM

## 2022-05-11 DIAGNOSIS — Z79.899 IMMUNODEFICIENCY DUE TO TREATMENT WITH IMMUNOSUPPRESSIVE MEDICATION (HCC): ICD-10-CM

## 2022-05-11 DIAGNOSIS — J01.00 ACUTE MAXILLARY SINUSITIS, RECURRENCE NOT SPECIFIED: ICD-10-CM

## 2022-05-11 DIAGNOSIS — D84.821 IMMUNODEFICIENCY DUE TO TREATMENT WITH IMMUNOSUPPRESSIVE MEDICATION (HCC): ICD-10-CM

## 2022-05-11 DIAGNOSIS — U07.1 LAB TEST POSITIVE FOR DETECTION OF COVID-19 VIRUS: Primary | ICD-10-CM

## 2022-05-11 PROCEDURE — 99213 OFFICE O/P EST LOW 20 MIN: CPT | Performed by: FAMILY MEDICINE

## 2022-05-11 NOTE — PROGRESS NOTES
Consent: Jaqui Mari, who was seen by synchronous (real-time) audio-video technology, and/or her healthcare decision maker, is aware that this patient-initiated, Telehealth encounter on 5/11/2022 is a billable service, with coverage as determined by her insurance carrier. She is aware that she may receive a bill and has provided verbal consent to proceed: Yes. Jaqui Mari is a 56 y.o. female    Vaccinated for [address] [place]    S/p tubal ligation       has a past medical history of BMI 45.0-49.9, adult ([facility] [address] (2/14/2018), Essential hypertension (4/17/2018), Gastroesophageal reflux disease without esophagitis (3/16/2017), H/O tubal ligation (3/16/2017), Iron deficiency anemia due to chronic blood loss (3/16/2017), Menorrhagia with regular cycle (4/3/2017), Migraine without aura and without status migrainosus, not intractable (3/16/2017), Mild intermittent asthma without complication (1/49/3342), MS (multiple sclerosis) ([facility] [address] (10/08), Obesity, Class III, BMI 40-49.9 (morbid obesity) ([facility] [address] (12/3/2015), S/P endometrial ablation (4/16/2018), and Vitamin D deficiency (3/16/2017). Labs reviewed w pt    4/23/2022 felt like had allergies, nasal congestion, feels like chest congested, took allergies medication. Was tested on 5/1/2022 had HA for 2 days. Tired. abdo upset, no appetite, chills. Most of her symptoms resolved except for nasal congestion that comes and goes. Denies myalgia. Tested at home after our visit and result was negative. Reviewed: active problem list, medication list, allergies, notes from last encounter, lab results    A comprehensive review of systems was negative except for that written in the HPI. Assessment & Plan:   Diagnoses and all orders for this visit:    1. Lab test positive for detection of COVID-19 virus    2. Acute maxillary sinusitis, recurrence not specified    3. Multiple sclerosis ([facility] [address]    4.  Immunodeficiency due to treatment with immunosuppressive medication ([facility]      Follow-up and Dispositions    · Return in about 3 months (around 8/11/2022) for HTN, HLD, meds, labs. 712  Subjective:   Jaqui Mari is a 56 y.o. female who was seen for Nasal Congestion and Positive For Covid-19      Prior to Admission medications    Medication Sig Start Date End Date Taking? Authorizing Provider   montelukast (SINGULAIR) 10 mg tablet TAKE 1 TABLET BY MOUTH DAILY 5/9/22  Yes Gavin Babin MD   ergocalciferol (ERGOCALCIFEROL) 1,250 mcg (50,000 unit) capsule TAKE 1 CAPSULE BY MOUTH EVERY 7 DAYS 4/20/22  Yes Gavin Babin MD   fluticasone propionate (FLONASE) 50 mcg/actuation nasal spray SHAKE LIQUID AND USE 2 SPRAYS IN EACH NOSTRIL DAILY 4/19/22  Yes Gavin Babin MD   albuterol (PROVENTIL HFA, VENTOLIN HFA, PROAIR HFA) 90 mcg/actuation inhaler INHALE 2 PUFFS EVERY 4 HOURS AS NEEDED FOR WHEEZING OR SHORTNESS OF BREATH AND COUGH 3/30/22  Yes Maggie Liu MD   DULoxetine (CYMBALTA) 60 mg capsule TAKE 1 CAPSULE BY MOUTH DAILY 3/30/22  Yes Maggie Liu MD   cetirizine (ZYRTEC) 10 mg tablet TAKE 1 TABLET BY MOUTH DAILY 3/28/22  Yes Maggie Liu MD   atorvastatin (LIPITOR) 10 mg tablet Take 1 Tablet by mouth daily. 3/8/22  Yes Maggie Liu MD   topiramate (TOPAMAX) 50 mg tablet Take 1 Tablet by mouth two (2) times a day. 3/8/22  Yes Maggie Liu MD   losartan (COZAAR) 100 mg tablet TAKE 1 TABLET BY MOUTH DAILY 1/5/22  Yes Gavin Babin MD   hydroCHLOROthiazide (HYDRODIURIL) 25 mg tablet TAKE 1 TABLET BY MOUTH DAILY 12/31/21  Yes Maggie Liu MD   omeprazole (PRILOSEC) 40 mg capsule TAKE 1 CAPSULE BY MOUTH DAILY 12/30/21  Yes Maggie Liu MD   ibuprofen-famotidine (Duexis) 800-26.6 mg tab Duexis 800 mg-26.6 mg tablet   Yes Provider, Historical   albuterol (PROVENTIL VENTOLIN) 2.5 mg /3 mL (0.083 %) nebu 3 mL by Nebulization route every four (4) hours as needed for Wheezing or Shortness of Breath.  11/9/21  Yes Maggie Liu MD fluticasone propion-salmeteroL (Advair Diskus) 250-50 mcg/dose diskus inhaler USE 1 INHALATION BY MOUTH EVERY 12 HOURS 9/28/21  Yes Nan Babin MD   gabapentin (NEURONTIN) 600 mg tablet Take 600 mg by mouth three (3) times daily. Yes Provider, Historical   ocrelizumab (OCREVUS) 30 mg/mL soln injection by IntraVENous route. Yes Provider, Historical   lidocaine (LIDODERM) 5 % Apply patch to the affected area for 12 hours a day and remove for 12 hours a day. Patient not taking: Reported on 5/11/2022 9/25/20   Angi Mayer MD   ibuprofen (MOTRIN) 800 mg tablet TK 1 T PO TID  Patient not taking: Reported on 11/9/2021 11/22/19   Provider, Historical   butalbital-acetaminophen-caffeine (FIORICET, ESGIC) -40 mg per tablet Take 1 Tab by mouth every six (6) hours as needed for Pain for up to 30 doses. Max 6 /24 hours  Patient not taking: Reported on 7/9/2021 6/23/18   Chloé Noonan MD     Allergies   Allergen Reactions   Hakan Jimenez [Natalizumab] Anaphylaxis         Objective:   Vital Signs: (As obtained by patient/caregiver at home)  There were no vitals taken for this visit.      Constitutional: [x] Appears well-developed and well-nourished [x] No apparent distress        Mental status: [x] Alert and awake  [x] Oriented to person/place/time [x] Able to follow commands      Eyes:   EOM    [x]  Normal      Sclera  [x]  Normal              Discharge [x]  None visible       HENT: [x] Normocephalic, atraumatic    [] Mouth/Throat: Mucous membranes are moist    External Ears [x] Normal      Neck: [x] No visualized mass     Pulmonary/Chest: [x] Respiratory effort normal   [x] No visualized signs of difficulty breathing or respiratory distress           Musculoskeletal:   [x] Normal gait with no signs of ataxia         [x] Normal range of motion of neck         Neurological:        [x] No Facial Asymmetry (Cranial nerve 7 motor function) (limited exam due to video visit)          [x] No gaze palsy Skin:        [x] No significant exanthematous lesions or discoloration noted on facial skin                  Psychiatric:       [x] Normal Affect [] Abnormal -        [x] No Hallucinations      We discussed the expected course, resolution and complications of the diagnosis(es) in detail. Medication risks, benefits, costs, interactions, and alternatives were discussed as indicated. I advised her to contact the office if her condition worsens, changes or fails to improve as anticipated. She expressed understanding with the diagnosis(es) and plan. Genaro Maldonado is a 56 y.o. female being evaluated by a video visit encounter for concerns as above. A caregiver was present when appropriate. Due to this being a TeleHealth encounter (During [employer]-00 public health emergency), evaluation of the following organ systems was limited: Vitals/Constitutional/EENT/Resp/CV/GI//MS/Neuro/Skin/Heme-Lymph-Imm. Pursuant to the emergency declaration under the [facility], 1135 waiver authority and the [employer] and [employer] Act, this Virtual  Visit was conducted, with patient's (and/or legal guardian's) consent, to reduce the patient's risk of exposure to COVID-19 and provide necessary medical care. Services were provided through a video synchronous discussion virtually to substitute for in-person clinic visit. Patient and provider were located at their individual homes.         Charmayne App, MD

## 2022-05-11 NOTE — PROGRESS NOTES
Chief Complaint   Patient presents with    Nasal Congestion    Positive For Covid-19       1. Have you been to the ER, urgent care clinic since your last visit? Hospitalized since your last visit? No     2. Have you seen or consulted any other health care providers outside of the [address] [place] [address] since your last visit? Include any pap smears or colon screening.  No

## 2022-06-28 DIAGNOSIS — I10 ESSENTIAL HYPERTENSION: ICD-10-CM

## 2022-06-29 RX ORDER — HYDROCHLOROTHIAZIDE 25 MG/1
TABLET ORAL
Qty: 90 TABLET | Refills: 0 | Status: SHIPPED | OUTPATIENT
Start: 2022-06-29 | End: 2022-07-25 | Stop reason: SDUPTHER

## 2022-06-29 RX ORDER — LOSARTAN POTASSIUM 100 MG/1
100 TABLET ORAL DAILY
Qty: 90 TABLET | Refills: 0 | Status: SHIPPED | OUTPATIENT
Start: 2022-06-29 | End: 2022-07-25 | Stop reason: SDUPTHER

## 2022-06-29 RX ORDER — CETIRIZINE HCL 10 MG
TABLET ORAL
Qty: 90 TABLET | Refills: 0 | Status: SHIPPED | OUTPATIENT
Start: 2022-06-29

## 2022-06-29 NOTE — TELEPHONE ENCOUNTER
She saw me for COVID in May. Needs apt in a few weeks for chronic medical issues.      kareen Verdugo MD  6/29/2022

## 2022-07-25 ENCOUNTER — OFFICE VISIT (OUTPATIENT)
Dept: FAMILY MEDICINE CLINIC | Age: 47
End: 2022-07-25
Payer: COMMERCIAL

## 2022-07-25 VITALS
SYSTOLIC BLOOD PRESSURE: 131 MMHG | WEIGHT: 293 LBS | RESPIRATION RATE: 18 BRPM | DIASTOLIC BLOOD PRESSURE: 88 MMHG | OXYGEN SATURATION: 98 % | HEIGHT: 66 IN | BODY MASS INDEX: 47.09 KG/M2 | HEART RATE: 100 BPM | TEMPERATURE: 96.8 F

## 2022-07-25 DIAGNOSIS — E78.00 PURE HYPERCHOLESTEROLEMIA: ICD-10-CM

## 2022-07-25 DIAGNOSIS — Z79.899 ENCOUNTER FOR LONG-TERM (CURRENT) USE OF MEDICATIONS: ICD-10-CM

## 2022-07-25 DIAGNOSIS — G89.29 CHRONIC BILATERAL LOW BACK PAIN WITH SCIATICA, SCIATICA LATERALITY UNSPECIFIED: ICD-10-CM

## 2022-07-25 DIAGNOSIS — E55.9 VITAMIN D DEFICIENCY: ICD-10-CM

## 2022-07-25 DIAGNOSIS — M48.061 FORAMINAL STENOSIS OF LUMBAR REGION: ICD-10-CM

## 2022-07-25 DIAGNOSIS — J45.41 ASTHMA IN ADULT, MODERATE PERSISTENT, WITH ACUTE EXACERBATION: ICD-10-CM

## 2022-07-25 DIAGNOSIS — L98.9 SKIN LESION: ICD-10-CM

## 2022-07-25 DIAGNOSIS — M54.40 CHRONIC BILATERAL LOW BACK PAIN WITH SCIATICA, SCIATICA LATERALITY UNSPECIFIED: ICD-10-CM

## 2022-07-25 DIAGNOSIS — I10 ESSENTIAL HYPERTENSION: Primary | ICD-10-CM

## 2022-07-25 PROCEDURE — 99214 OFFICE O/P EST MOD 30 MIN: CPT | Performed by: FAMILY MEDICINE

## 2022-07-25 RX ORDER — ALBUTEROL SULFATE 90 UG/1
AEROSOL, METERED RESPIRATORY (INHALATION)
Qty: 18 G | Refills: 2 | Status: SHIPPED | OUTPATIENT
Start: 2022-07-25

## 2022-07-25 RX ORDER — HYDROCHLOROTHIAZIDE 25 MG/1
25 TABLET ORAL DAILY
Qty: 90 TABLET | Refills: 1 | Status: SHIPPED | OUTPATIENT
Start: 2022-07-25

## 2022-07-25 RX ORDER — TIZANIDINE 4 MG/1
TABLET ORAL
COMMUNITY
Start: 2022-05-19

## 2022-07-25 RX ORDER — MONTELUKAST SODIUM 10 MG/1
10 TABLET ORAL DAILY
Qty: 30 TABLET | Refills: 5 | Status: SHIPPED | OUTPATIENT
Start: 2022-07-25

## 2022-07-25 RX ORDER — ERGOCALCIFEROL 1.25 MG/1
50000 CAPSULE ORAL
Qty: 12 CAPSULE | Refills: 1 | Status: SHIPPED | OUTPATIENT
Start: 2022-07-25

## 2022-07-25 RX ORDER — LOSARTAN POTASSIUM 100 MG/1
100 TABLET ORAL DAILY
Qty: 90 TABLET | Refills: 1 | Status: SHIPPED | OUTPATIENT
Start: 2022-07-25

## 2022-07-25 RX ORDER — ATORVASTATIN CALCIUM 10 MG/1
10 TABLET, FILM COATED ORAL DAILY
Qty: 90 TABLET | Refills: 1 | Status: SHIPPED | OUTPATIENT
Start: 2022-07-25

## 2022-07-25 RX ORDER — DULOXETIN HYDROCHLORIDE 60 MG/1
60 CAPSULE, DELAYED RELEASE ORAL DAILY
Qty: 90 CAPSULE | Refills: 1 | Status: SHIPPED | OUTPATIENT
Start: 2022-07-25

## 2022-07-25 NOTE — PROGRESS NOTES
Consent: Sharif Flores, who was seen by synchronous (real-time) audio-video technology, and/or her healthcare decision maker, is aware that this patient-initiated, Telehealth encounter on 7/25/2022 is a billable service, with coverage as determined by her insurance carrier. She is aware that she may receive a bill and has provided verbal consent to proceed: Yes. Sharif Flores is a 56 y.o. female,     She forgot to do labs again, this time it's been 13 months now. Discussed need to do it today, unable to more refill next time    Vaccinated for COVID      S/p tubal ligation     has a past medical history of Autoimmune disease ([facility] (10/1/2008), BMI 45.0-49.9, adult ([facility] (02/14/2018), Chronic pain (9/1/2016), Depression (09/01/2020), Essential hypertension (04/17/2018), Gastroesophageal reflux disease without esophagitis (03/16/2017), H/O tubal ligation (03/16/2017), Iron deficiency anemia due to chronic blood loss (03/16/2017), Menorrhagia with regular cycle (04/03/2017), Migraine without aura and without status migrainosus, not intractable (03/16/2017), Mild intermittent asthma without complication (65/91/9940), MS (multiple sclerosis) ([facility] (10/2008), Obesity, Class III, BMI 40-49.9 (morbid obesity) ([facility] (12/03/2015), S/P endometrial ablation (04/16/2018), and Vitamin D deficiency (03/16/2017). depression and anxiety  Mood has been good  Denies SI or HI. Cymbalta helps with pain. Also helped with her mood. She feels happy. Continue cymbalta 60mg. Asthma: mild-moderate persistent w/o complication  Advair, albuterol    HTN: at goal today  HCTZ, losartan    HLD: LDL worsen  LIpitor 10mg      Weight loss counseling:   She's gained more weight. She's been on topamax for 6 months. Does help with appetite, eats only once but still gaining weight. Refer to gastric bypass. She never did call. Again reminded need to see us 3X for letter or support.      Weight loss counseling. Discussed calories count, goal for 1400cal/day. Exercise. Choice of food. last visit we increased topamax 50mg bid - does help, appetite lessen               We ordered EKG in anticipation of phentermine - she didn't do them yet.               she'll let us know when EKG done so we can start phentermine      in custodial, need hep c test    GERD: prilosec 40mg    GARRY on CPAP    Chronic low back pain seeing pain specialist, had steroid injection. S/p nerve ablation on one side. So still have burning on the other side. Gabapentin 300mg written by Orthopedic Dr. Rachel Perez. She up it herself to BID. Saw Dr. Rachel Perez recently, 7/20/2020  Evelyn Liner to pain specialist had a steroid injection, nerve block just 2 days ago. Gabapentin increased to 600mg 4X daily. She's been in PT for 4 weeks. Neuro at [facility]  Migraine HA  MS managed               Gabapentin filled by her Neurologist     Saw OBGYN: had papsmear there 03/2022  Mammogram done 12/2021    Colonoscopy 12/2021, request medical record    She wants derm for general \"age related skin, wrinkle\", etc... Reviewed: active problem list, medication list, allergies, notes from last encounter, lab results    A comprehensive review of systems was negative except for that written in the HPI. Assessment & Plan:   Diagnoses and all orders for this visit:    1. Essential hypertension  -     hydroCHLOROthiazide (HYDRODIURIL) 25 mg tablet; Take 1 Tablet by mouth in the morning.  -     losartan (COZAAR) 100 mg tablet; Take 1 Tablet by mouth in the morning.  -     METABOLIC PANEL, COMPREHENSIVE; Future  -     TSH 3RD GENERATION; Future    2. Pure hypercholesterolemia  -     atorvastatin (LIPITOR) 10 mg tablet; Take 1 Tablet by mouth in the morning.  -     METABOLIC PANEL, COMPREHENSIVE; Future  -     TSH 3RD GENERATION; Future    3.  Asthma in adult, moderate persistent, with acute exacerbation  - albuterol (PROVENTIL HFA, VENTOLIN HFA, PROAIR HFA) 90 mcg/actuation inhaler; INHALE 2 PUFFS EVERY 4 HOURS AS NEEDED FOR WHEEZING OR SHORTNESS OF BREATH AND COUGH  -     montelukast (SINGULAIR) 10 mg tablet; Take 1 Tablet by mouth in the morning. 4. Foraminal stenosis of lumbar region  -     DULoxetine (CYMBALTA) 60 mg capsule; Take 1 Capsule by mouth in the morning. 5. Chronic bilateral low back pain with sciatica, sciatica laterality unspecified  -     DULoxetine (CYMBALTA) 60 mg capsule; Take 1 Capsule by mouth in the morning. 6. Vitamin D deficiency  -     ergocalciferol (ERGOCALCIFEROL) 1,250 mcg (50,000 unit) capsule; Take 1 Capsule by mouth every seven (7) days. 7. Encounter for long-term (current) use of medications  -     METABOLIC PANEL, COMPREHENSIVE; Future  -     TSH 3RD GENERATION; Future    8. Skin lesion  -     REFERRAL TO DERMATOLOGY      Follow-up and Dispositions    Return in about 4 months (around 11/25/2022) for HTN, HLD, chronic pain, asthma. 712  Subjective:   Crissy Cerrato is a 56 y.o. female who was seen for Medication Refill      Prior to Admission medications    Medication Sig Start Date End Date Taking? Authorizing Provider   atorvastatin (LIPITOR) 10 mg tablet Take 1 Tablet by mouth in the morning. 7/25/22  Yes Miguelina Babin MD   DULoxetine (CYMBALTA) 60 mg capsule Take 1 Capsule by mouth in the morning. 7/25/22  Yes Miguelina Babin MD   ergocalciferol (ERGOCALCIFEROL) 1,250 mcg (50,000 unit) capsule Take 1 Capsule by mouth every seven (7) days.  7/25/22  Yes Miguelina Babin MD   albuterol (PROVENTIL HFA, VENTOLIN HFA, PROAIR HFA) 90 mcg/actuation inhaler INHALE 2 PUFFS EVERY 4 HOURS AS NEEDED FOR WHEEZING OR SHORTNESS OF BREATH AND COUGH 7/25/22  Yes Carleen Levin MD   hydroCHLOROthiazide (HYDRODIURIL) 25 mg tablet Take 1 Tablet by mouth in the morning. 7/25/22  Yes Carleen Levin MD   losartan (COZAAR) 100 mg tablet Take 1 Tablet by mouth in the morning. 7/25/22 Yes Edson Mckeon MD   montelukast (SINGULAIR) 10 mg tablet Take 1 Tablet by mouth in the morning. 7/25/22  Yes Luis Enrique Babin MD   cetirizine (ZYRTEC) 10 mg tablet TAKE 1 TABLET BY MOUTH DAILY 6/29/22  Yes Luis Enrique Babin MD   fluticasone propionate (FLONASE) 50 mcg/actuation nasal spray SHAKE LIQUID AND USE 2 SPRAYS IN EACH NOSTRIL DAILY 4/19/22  Yes Edson Mckeon MD   omeprazole (PRILOSEC) 40 mg capsule TAKE 1 CAPSULE BY MOUTH DAILY 12/30/21  Yes Edson Mckeon MD   ibuprofen-famotidine 800-26.6 mg tab Duexis 800 mg-26.6 mg tablet   Yes Provider, Historical   albuterol (PROVENTIL VENTOLIN) 2.5 mg /3 mL (0.083 %) nebu 3 mL by Nebulization route every four (4) hours as needed for Wheezing or Shortness of Breath. 11/9/21  Yes Edson Mckeon MD   fluticasone propion-salmeteroL (Advair Diskus) 250-50 mcg/dose diskus inhaler USE 1 INHALATION BY MOUTH EVERY 12 HOURS 9/28/21  Yes Luis Enrique Babin MD   gabapentin (NEURONTIN) 600 mg tablet Take 600 mg by mouth three (3) times daily. Yes Provider, Historical   ocrelizumab (OCREVUS) 30 mg/mL soln injection by IntraVENous route. Yes Provider, Historical   tiZANidine (ZANAFLEX) 4 mg tablet TAKE 1 TABLET BY MOUTH TWICE DAILY AS NEEDED  Patient not taking: Reported on 7/25/2022 5/19/22   Provider, Historical   topiramate (TOPAMAX) 50 mg tablet Take 1 Tablet by mouth two (2) times a day. Patient not taking: Reported on 7/25/2022 3/8/22   Edson Mckeon MD   butalbital-acetaminophen-caffeine (FIORICET, ESGIC) -40 mg per tablet Take 1 Tab by mouth every six (6) hours as needed for Pain for up to 30 doses.  Max 6 /24 hours  Patient not taking: No sig reported 6/23/18   Fransisca Hoskins MD     Allergies   Allergen Reactions    Tysabri [Natalizumab] Anaphylaxis       Patient Active Problem List   Diagnosis Code    Multiple sclerosis ([facility] G35    Heavy menstrual period N92.0    Anemia D64.9    Obesity, Class III, BMI 40-49.9 (morbid obesity) ([facility]) E66.01    Mild intermittent asthma without complication O93.63    Vitamin D deficiency E55.9    Iron deficiency anemia due to chronic blood loss D50.0    Migraine without aura and without status migrainosus, not intractable G43.009    H/O tubal ligation Z98.51    Gastroesophageal reflux disease without esophagitis K21.9    Menorrhagia with regular cycle N92.0    BMI 45.0-49.9, adult ([facility] Z68.42    S/P endometrial ablation Z98.890    Essential hypertension I10    Acute relapsing multiple sclerosis ([facility] G35    Encounter for long-term (current) use of medications Z79.899    Depression, major, recurrent, mild (HCC) F33.0     Past Medical History:   Diagnosis Date    Autoimmune disease ([facility] 10/1/2008    Multiple Sclerosis    BMI 45.0-49.9, adult ([facility] 2018    Chronic pain 2016    Lumbar back pain    Depression 2020    Seems to come and go    Essential hypertension 2018    Gastroesophageal reflux disease without esophagitis 2017    H/O tubal ligation 2017    Iron deficiency anemia due to chronic blood loss 2017    Menorrhagia with regular cycle 2017    Migraine without aura and without status migrainosus, not intractable 2017    Mild intermittent asthma without complication     MS (multiple sclerosis) ([facility] 10/2008    using clinical trial medications    Obesity, Class III, BMI 40-49.9 (morbid obesity) ([facility] 2015    S/P endometrial ablation 2018    Vitamin D deficiency 2017     Past Surgical History:   Procedure Laterality Date    HX  SECTION      x3; , , &     NEUROLOGICAL PROCEDURE UNLISTED  10/1/2008    Multiple Sclerosis    [place] LAP,TUBAL CAUTERY       Objective:   Vital Signs: (As obtained by patient/caregiver at home)  Visit Vitals  /88 (BP 1 Location: Left upper arm, BP Patient Position: Sitting, BP Cuff Size: Large adult)   Pulse 100   Temp 96.8 °F (36 °C) (Temporal)   Resp 18   Ht 5' 6\" (1.676 m)   Wt 327 lb (148.3 kg) SpO2 98%   BMI 52.78 kg/m²        Constitutional: [x] Appears well-developed and well-nourished [x] No apparent distress        Mental status: [x] Alert and awake  [x] Oriented to person/place/time [x] Able to follow commands      Eyes:   EOM    [x]  Normal      Sclera  [x]  Normal              Discharge [x]  None visible       HENT: [x] Normocephalic, atraumatic    [] Mouth/Throat: Mucous membranes are moist    External Ears [x] Normal      Neck: [x] No visualized mass     Pulmonary/Chest: [x] Respiratory effort normal   [x] No visualized signs of difficulty breathing or respiratory distress           Musculoskeletal:   [x] Normal gait with no signs of ataxia         [x] Normal range of motion of neck         Neurological:        [x] No Facial Asymmetry (Cranial nerve 7 motor function) (limited exam due to video visit)          [x] No gaze palsy              Skin:        [x] No significant exanthematous lesions or discoloration noted on facial skin                  Psychiatric:       [x] Normal Affect [] Abnormal -        [x] No Hallucinations      We discussed the expected course, resolution and complications of the diagnosis(es) in detail. Medication risks, benefits, costs, interactions, and alternatives were discussed as indicated. I advised her to contact the office if her condition worsens, changes or fails to improve as anticipated. She expressed understanding with the diagnosis(es) and plan. Mahendra Nugent is a 56 y.o. female being evaluated by a video visit encounter for concerns as above. A caregiver was present when appropriate. Due to this being a TeleHealth encounter (During BYBNZ-38 public health emergency), evaluation of the following organ systems was limited: Vitals/Constitutional/EENT/Resp/CV/GI//MS/Neuro/Skin/Heme-Lymph-Imm.   Pursuant to the emergency declaration under the [address] [facility] [place] [address] and Response Supplemental Appropriations Act, this Virtual  Visit was conducted, with patient's (and/or legal guardian's) consent, to reduce the patient's risk of exposure to COVID-19 and provide necessary medical care. Services were provided through a video synchronous discussion virtually to substitute for in-person clinic visit. Patient and provider were located at their individual homes.       Ni Santiago MD

## 2022-07-25 NOTE — PROGRESS NOTES
Identified pt with two pt identifiers(name and ). Reviewed record in preparation for visit and have obtained necessary documentation. Chief Complaint   Patient presents with    Medication Refill          Vitals:    22 1057   BP: 131/88   Pulse: 100   Resp: 18   Temp: 96.8 °F (36 °C)   TempSrc: Temporal   SpO2: 98%   Weight: 327 lb (148.3 kg)   Height: 5' 6\" (1.676 m)   PainSc:   0 - No pain       Health Maintenance Due   Topic    Pneumococcal 0-64 years (2 - PCV)    Colorectal Cancer Screening Combo     COVID-19 Vaccine (4 - Booster for Pfizer series)    Cervical cancer screen        1. \"Have you been to the ER, urgent care clinic since your last visit? Hospitalized since your last visit? \" No    2. \"Have you seen or consulted any other health care providers outside of the [address] [place] [address] since your last visit? \" No     3. For patients over 45: Has the patient had a colonoscopy? No     If the patient is female:    4. For patients over 40: Has the patient had a mammogram? No    5. For patients over 21: Has the patient had a pap smear?  No    Current Outpatient Medications   Medication Instructions    albuterol (PROVENTIL HFA, VENTOLIN HFA, PROAIR HFA) 90 mcg/actuation inhaler INHALE 2 PUFFS EVERY 4 HOURS AS NEEDED FOR WHEEZING OR SHORTNESS OF BREATH AND COUGH    albuterol (PROVENTIL VENTOLIN) 2.5 mg, Nebulization, EVERY 4 HOURS AS NEEDED    atorvastatin (LIPITOR) 10 mg, Oral, DAILY    butalbital-acetaminophen-caffeine (FIORICET, ESGIC) -40 mg per tablet 1 Tablet, Oral, EVERY 6 HOURS AS NEEDED, Max 6 /24 hours    cetirizine (ZYRTEC) 10 mg tablet TAKE 1 TABLET BY MOUTH DAILY    DULoxetine (CYMBALTA) 60 mg capsule TAKE 1 CAPSULE BY MOUTH DAILY    ergocalciferol (ERGOCALCIFEROL) 1,250 mcg (50,000 unit) capsule TAKE 1 CAPSULE BY MOUTH EVERY 7 DAYS    fluticasone propion-salmeteroL (Advair Diskus) 250-50 mcg/dose diskus inhaler USE 1 INHALATION BY MOUTH EVERY 12 HOURS    fluticasone propionate (FLONASE) 50 mcg/actuation nasal spray SHAKE LIQUID AND USE 2 SPRAYS IN EACH NOSTRIL DAILY    gabapentin (NEURONTIN) 600 mg, Oral, 3 TIMES DAILY    hydroCHLOROthiazide (HYDRODIURIL) 25 mg tablet TAKE 1 TABLET BY MOUTH DAILY    ibuprofen (MOTRIN) 800 mg tablet TK 1 T PO TID    ibuprofen-famotidine 800-26.6 mg tab Duexis 800 mg-26.6 mg tablet    lidocaine (LIDODERM) 5 % Apply patch to the affected area for 12 hours a day and remove for 12 hours a day.     losartan (COZAAR) 100 mg, Oral, DAILY    montelukast (SINGULAIR) 10 mg, Oral, DAILY    ocrelizumab (OCREVUS) 30 mg/mL soln injection IntraVENous    omeprazole (PRILOSEC) 40 mg capsule TAKE 1 CAPSULE BY MOUTH DAILY    tiZANidine (ZANAFLEX) 4 mg tablet TAKE 1 TABLET BY MOUTH TWICE DAILY AS NEEDED    topiramate (TOPAMAX) 50 mg, Oral, 2 TIMES DAILY       Allergies   Allergen Reactions    Tysabri [Natalizumab] Anaphylaxis       Immunization History   Administered Date(s) Administered    COVID-19, PFIZER PURPLE top, DILUTE for use, (age 15 y+), IM, 30mcg/0.3mL 04/06/2021, 04/29/2021, 11/05/2021    Influenza Vaccine 10/03/2016, 10/01/2017, 10/12/2017, 12/21/2021    Influenza Vaccine (Quad) PF (>6 Mo Flulaval, Fluarix, and >3 Yrs Afluria, Fluzone [id number]) 11/13/2019, 11/12/2020    Influenza Vaccine Intradermal PF 10/09/2015    Pneumococcal Polysaccharide (PPSV-23) 01/13/2020    Td, Adsorbed PF 03/30/2017    Tdap 10/19/2016       Past Medical History:   Diagnosis Date    Autoimmune disease ([facility] 10/1/2008    Multiple Sclerosis    BMI 45.0-49.9, adult ([facility] 02/14/2018    Chronic pain 9/1/2016    Lumbar back pain    Depression 09/01/2020    Seems to come and go    Essential hypertension 04/17/2018    Gastroesophageal reflux disease without esophagitis 03/16/2017    H/O tubal ligation 03/16/2017    Iron deficiency anemia due to chronic blood loss 03/16/2017    Menorrhagia with regular cycle 04/03/2017    Migraine without aura and without status migrainosus, not intractable 03/16/2017    Mild intermittent asthma without complication 48/38/0433    MS (multiple sclerosis) ([facility] 10/2008    using clinical trial medications    Obesity, Class III, BMI 40-49.9 (morbid obesity) ([facility] 12/03/2015    S/P endometrial ablation 04/16/2018    Vitamin D deficiency 03/16/2017

## 2022-08-26 DIAGNOSIS — K21.9 GASTROESOPHAGEAL REFLUX DISEASE WITHOUT ESOPHAGITIS: ICD-10-CM

## 2022-08-26 RX ORDER — OMEPRAZOLE 40 MG/1
CAPSULE, DELAYED RELEASE ORAL
Qty: 90 CAPSULE | Refills: 0 | Status: SHIPPED | OUTPATIENT
Start: 2022-08-26

## 2022-09-06 ENCOUNTER — VIRTUAL VISIT (OUTPATIENT)
Dept: FAMILY MEDICINE CLINIC | Age: 47
End: 2022-09-06
Payer: COMMERCIAL

## 2022-09-06 ENCOUNTER — HOSPITAL ENCOUNTER (OUTPATIENT)
Dept: GENERAL RADIOLOGY | Age: 47
Discharge: HOME | End: 2022-09-06
Payer: COMMERCIAL

## 2022-09-06 DIAGNOSIS — S93.401D SPRAIN OF RIGHT ANKLE, UNSPECIFIED LIGAMENT, SUBSEQUENT ENCOUNTER: ICD-10-CM

## 2022-09-06 DIAGNOSIS — S93.401D SPRAIN OF RIGHT ANKLE, UNSPECIFIED LIGAMENT, SUBSEQUENT ENCOUNTER: Primary | ICD-10-CM

## 2022-09-06 PROCEDURE — 99213 OFFICE O/P EST LOW 20 MIN: CPT | Performed by: FAMILY MEDICINE

## 2022-09-06 PROCEDURE — 73610 X-RAY EXAM OF ANKLE: CPT

## 2022-09-06 NOTE — PROGRESS NOTES
Chief Complaint   Patient presents with    Ankle Injury     Tripped on steps. Hurt ankle not getting any better       1. Have you been to the ER, urgent care clinic since your last visit? Hospitalized since your last visit? Yes ER    2. Have you seen or consulted any other health care providers outside of the [address] [place] [address] since your last visit? Include any pap smears or colon screening.  No

## 2022-09-06 NOTE — PROGRESS NOTES
Consent: Smooth Howard, who was seen by synchronous (real-time) audio-video technology, and/or her healthcare decision maker, is aware that this patient-initiated, Telehealth encounter on 9/6/2022 is a billable service, with coverage as determined by her insurance carrier. She is aware that she may receive a bill and has provided verbal consent to proceed: Yes. Smooth Howard is a 56 y.o. female     it's been 13 months now. Vaccinated for COVID       S/p tubal ligation       has a past medical history of Autoimmune disease ([facility] (10/1/2008), BMI 45.0-49.9, adult ([facility] (02/14/2018), Chronic pain (9/1/2016), Depression (09/01/2020), Essential hypertension (04/17/2018), Gastroesophageal reflux disease without esophagitis (03/16/2017), H/O tubal ligation (03/16/2017), Iron deficiency anemia due to chronic blood loss (03/16/2017), Menorrhagia with regular cycle (04/03/2017), Migraine without aura and without status migrainosus, not intractable (03/16/2017), Mild intermittent asthma without complication (33/92/8926), MS (multiple sclerosis) ([facility] (10/2008), Obesity, Class III, BMI 40-49.9 (morbid obesity) ([facility] (12/03/2015), S/P endometrial ablation (04/16/2018), and Vitamin D deficiency (03/16/2017). Right ankle  4 weeks ago, step down and twisted her ankle went to ED in [facility] was told ok on Xrays. Rotating ankle aches, still swelling. Medial and achilles tendon painful. Xr ankle right  Refer pt    She is already taking NSAIDs and gabapentin for her back pain    We discussed stretch, ankle brace, just ADL, no heavy weight on ankle for another 2-4wks  If not better we'll consider mri  FINDINGS: Three views of the right ankle demonstrate no fracture or disruption of the ankle mortise. There is no other acute osseous or articular abnormality. The soft tissues are within normal limits. IMPRESSION  No acute fracture or dislocation.       Reviewed: active problem list, medication list, allergies, notes from last encounter, lab results    A comprehensive review of systems was negative except for that written in the HPI. Assessment & Plan:   Diagnoses and all orders for this visit:    1. Sprain of right ankle, unspecified ligament, subsequent encounter  -     XR ANKLE RT MIN 3 V; Future  -     REFERRAL TO PHYSICAL THERAPY    Follow-up and Dispositions    Return in about 2 weeks (around 9/20/2022), or if symptoms worsen or fail to improve. 712  Subjective:   Sonal Larsen is a 56 y.o. female who was seen for Ankle Injury (Tripped on steps. Hurt ankle not getting any better)      Prior to Admission medications    Medication Sig Start Date End Date Taking? Authorizing Provider   omeprazole (PRILOSEC) 40 mg capsule TAKE 1 CAPSULE BY MOUTH DAILY 8/26/22  Yes Serg Babin MD   tiZANidine (ZANAFLEX) 4 mg tablet  5/19/22  Yes Provider, Guido   atorvastatin (LIPITOR) 10 mg tablet Take 1 Tablet by mouth in the morning. 7/25/22  Yes Serg Babin MD   DULoxetine (CYMBALTA) 60 mg capsule Take 1 Capsule by mouth in the morning. 7/25/22  Yes León Valenzuela MD   ergocalciferol (ERGOCALCIFEROL) 1,250 mcg (50,000 unit) capsule Take 1 Capsule by mouth every seven (7) days.  7/25/22  Yes Serg Babin MD   albuterol (PROVENTIL HFA, VENTOLIN HFA, PROAIR HFA) 90 mcg/actuation inhaler INHALE 2 PUFFS EVERY 4 HOURS AS NEEDED FOR WHEEZING OR SHORTNESS OF BREATH AND COUGH 7/25/22  Yes León Valenzuela MD   hydroCHLOROthiazide (HYDRODIURIL) 25 mg tablet Take 1 Tablet by mouth in the morning. 7/25/22  Yes Serg Babin MD   losartan (COZAAR) 100 mg tablet Take 1 Tablet by mouth in the morning. 7/25/22  Yes Serg Babin MD   montelukast (SINGULAIR) 10 mg tablet Take 1 Tablet by mouth in the morning. 7/25/22  Yes León Valenzuela MD   cetirizine (ZYRTEC) 10 mg tablet TAKE 1 TABLET BY MOUTH DAILY 6/29/22  Yes Serg Babin MD   fluticasone propionate (FLONASE) 50 mcg/actuation nasal spray SHAKE LIQUID AND USE 2 SPRAYS IN EACH NOSTRIL DAILY 4/19/22  Yes Codi Andrade MD   ibuprofen-famotidine 800-26.6 mg tab Duexis 800 mg-26.6 mg tablet   Yes Provider, Historical   albuterol (PROVENTIL VENTOLIN) 2.5 mg /3 mL (0.083 %) nebu 3 mL by Nebulization route every four (4) hours as needed for Wheezing or Shortness of Breath. 11/9/21  Yes Codi Andrade MD   gabapentin (NEURONTIN) 600 mg tablet Take 600 mg by mouth three (3) times daily. Yes Provider, Historical   ocrelizumab (OCREVUS) 30 mg/mL soln injection by IntraVENous route. Yes Provider, Historical   topiramate (TOPAMAX) 50 mg tablet Take 1 Tablet by mouth two (2) times a day. Patient not taking: No sig reported 3/8/22   Codi Andrade MD   fluticasone propion-salmeteroL (Advair Diskus) 250-50 mcg/dose diskus inhaler USE 1 INHALATION BY MOUTH EVERY 12 HOURS  Patient not taking: Reported on 9/6/2022 9/28/21   Codi Andrade MD   butalbital-acetaminophen-caffeine (FIORICET, ESGIC) -40 mg per tablet Take 1 Tab by mouth every six (6) hours as needed for Pain for up to 30 doses. Max 6 /24 hours  Patient not taking: No sig reported 6/23/18   Jeannie Coreas MD     Allergies   Allergen Reactions    Tysabri [Natalizumab] Anaphylaxis         Objective:   Vital Signs: (As obtained by patient/caregiver at home)  There were no vitals taken for this visit.      Constitutional: [x] Appears well-developed and well-nourished [x] No apparent distress        Mental status: [x] Alert and awake  [x] Oriented to person/place/time [x] Able to follow commands      Eyes:   EOM    [x]  Normal      Sclera  [x]  Normal              Discharge [x]  None visible       HENT: [x] Normocephalic, atraumatic    [] Mouth/Throat: Mucous membranes are moist    External Ears [x] Normal      Neck: [x] No visualized mass     Pulmonary/Chest: [x] Respiratory effort normal   [x] No visualized signs of difficulty breathing or respiratory distress           Musculoskeletal:   [x] Normal gait with no signs of ataxia         [x] Normal range of motion of neck         Neurological:        [x] No Facial Asymmetry (Cranial nerve 7 motor function) (limited exam due to video visit)          [x] No gaze palsy              Skin:        [x] No significant exanthematous lesions or discoloration noted on facial skin                  Psychiatric:       [x] Normal Affect [] Abnormal -        [x] No Hallucinations      We discussed the expected course, resolution and complications of the diagnosis(es) in detail. Medication risks, benefits, costs, interactions, and alternatives were discussed as indicated. I advised her to contact the office if her condition worsens, changes or fails to improve as anticipated. She expressed understanding with the diagnosis(es) and plan. Zoya Cano is a 56 y.o. female being evaluated by a video visit encounter for concerns as above. A caregiver was present when appropriate. Due to this being a TeleHealth encounter (During [facility]- public health emergency), evaluation of the following organ systems was limited: Vitals/Constitutional/EENT/Resp/CV/GI//MS/Neuro/Skin/Heme-Lymph-Imm. Pursuant to the emergency declaration under the [facility]5 waiver authority and the [employer] and [employer] General Act, this Virtual  Visit was conducted, with patient's (and/or legal guardian's) consent, to reduce the patient's risk of exposure to COVID-19 and provide necessary medical care. Services were provided through a video synchronous discussion virtually to substitute for in-person clinic visit. Patient and provider were located at their individual homes.         Giovanni Rausch MD

## 2022-09-20 ENCOUNTER — VIRTUAL VISIT (OUTPATIENT)
Dept: FAMILY MEDICINE CLINIC | Age: 47
End: 2022-09-20
Payer: COMMERCIAL

## 2022-09-20 DIAGNOSIS — J06.9 ACUTE URI: Primary | ICD-10-CM

## 2022-09-20 DIAGNOSIS — J01.00 ACUTE NON-RECURRENT MAXILLARY SINUSITIS: ICD-10-CM

## 2022-09-20 PROCEDURE — 99213 OFFICE O/P EST LOW 20 MIN: CPT | Performed by: FAMILY MEDICINE

## 2022-09-20 RX ORDER — IBUPROFEN AND PHENYLEPHRINE HYDROCHLORIDE 200; 10 MG/1; MG/1
TABLET, FILM COATED ORAL
COMMUNITY
End: 2022-10-19 | Stop reason: ALTCHOICE

## 2022-09-20 RX ORDER — PREDNISONE 5 MG/1
TABLET ORAL
Qty: 21 TABLET | Refills: 0 | Status: SHIPPED | OUTPATIENT
Start: 2022-09-20 | End: 2022-09-28 | Stop reason: ALTCHOICE

## 2022-09-20 RX ORDER — AMOXICILLIN AND CLAVULANATE POTASSIUM 875; 125 MG/1; MG/1
1 TABLET, FILM COATED ORAL 2 TIMES DAILY
Qty: 14 TABLET | Refills: 0 | Status: SHIPPED | OUTPATIENT
Start: 2022-09-20 | End: 2022-09-28 | Stop reason: ALTCHOICE

## 2022-09-20 NOTE — PROGRESS NOTES
Chief Complaint   Patient presents with    Sinus Infection     Head & face pain, congestion        1. Have you been to the ER, urgent care clinic since your last visit? Hospitalized since your last visit? No    2. Have you seen or consulted any other health care providers outside of the [address] [place] [address] since your last visit? Include any pap smears or colon screening.  No

## 2022-09-20 NOTE — PROGRESS NOTES
Consent: Yudi Patel, who was seen by synchronous (real-time) audio-video technology, and/or her healthcare decision maker, is aware that this patient-initiated, Telehealth encounter on 9/20/2022 is a billable service, with coverage as determined by her insurance carrier. She is aware that she may receive a bill and has provided verbal consent to proceed: Yes. Yudi Patel is a 56 y.o. female       has a past medical history of Autoimmune disease ([facility] (10/1/2008), BMI 45.0-49.9, adult ([facility] (02/14/2018), Chronic pain (9/1/2016), Depression (09/01/2020), Essential hypertension (04/17/2018), Gastroesophageal reflux disease without esophagitis (03/16/2017), H/O tubal ligation (03/16/2017), Iron deficiency anemia due to chronic blood loss (03/16/2017), Menorrhagia with regular cycle (04/03/2017), Migraine without aura and without status migrainosus, not intractable (03/16/2017), Mild intermittent asthma without complication (85/58/4132), MS (multiple sclerosis) ([facility] (10/2008), Obesity, Class III, BMI 40-49.9 (morbid obesity) ([facility] (12/03/2015), S/P endometrial ablation (04/16/2018), and Vitamin D deficiency (03/16/2017). X 5 days. Face congested, aching, head aching. Nasal congestion, thick drainage. Denies cough, myalgia, fever, chills. Tested COVID 2 days ago was negative. Augmentin BID X 7 d    She wanted prednisone too b/c previously needs that to help resolve her conditions. We'll do low dose prednisone taper. She's already on immunomodulator for her MS      Reviewed: active problem list, medication list, allergies, notes from last encounter, lab results    A comprehensive review of systems was negative except for that written in the HPI. Assessment & Plan:   Diagnoses and all orders for this visit:    1. Acute URI  -     amoxicillin-clavulanate (AUGMENTIN) 875-125 mg per tablet; Take 1 Tablet by mouth two (2) times a day for 7 days.   -     predniSONE (STERAPRED) 5 mg dose pack; See administration instruction per 5mg dose pack    2. Acute non-recurrent maxillary sinusitis  -     amoxicillin-clavulanate (AUGMENTIN) 875-125 mg per tablet; Take 1 Tablet by mouth two (2) times a day for 7 days. -     predniSONE (STERAPRED) 5 mg dose pack; See administration instruction per 5mg dose pack    Follow-up and Dispositions    Return in about 3 days (around 9/23/2022), or if symptoms worsen or fail to improve. 712  Subjective:   Tasha Holliday is a 56 y.o. female who was seen for Sinus Infection (Head & face pain, congestion )      Prior to Admission medications    Medication Sig Start Date End Date Taking? Authorizing Provider   ibuprofen-phenylephrine (Sudafed PE Head Congestn-Pain) 200-10 mg tab Take  by mouth. Yes Provider, Historical   amoxicillin-clavulanate (AUGMENTIN) 875-125 mg per tablet Take 1 Tablet by mouth two (2) times a day for 7 days. 9/20/22 9/27/22 Yes Tanner Babin MD   predniSONE (STERAPRED) 5 mg dose pack See administration instruction per 5mg dose pack 9/20/22  Yes Tanner Babin MD   omeprazole (PRILOSEC) 40 mg capsule TAKE 1 CAPSULE BY MOUTH DAILY 8/26/22  Yes Aman Willis MD   tiZANidine (ZANAFLEX) 4 mg tablet  5/19/22  Yes Provider, Historical   atorvastatin (LIPITOR) 10 mg tablet Take 1 Tablet by mouth in the morning. 7/25/22  Yes Aman Willis MD   DULoxetine (CYMBALTA) 60 mg capsule Take 1 Capsule by mouth in the morning. 7/25/22  Yes Aman Willis MD   ergocalciferol (ERGOCALCIFEROL) 1,250 mcg (50,000 unit) capsule Take 1 Capsule by mouth every seven (7) days.  7/25/22  Yes Tanner Babin MD   albuterol (PROVENTIL HFA, VENTOLIN HFA, PROAIR HFA) 90 mcg/actuation inhaler INHALE 2 PUFFS EVERY 4 HOURS AS NEEDED FOR WHEEZING OR SHORTNESS OF BREATH AND COUGH 7/25/22  Yes Aman Willis MD   hydroCHLOROthiazide (HYDRODIURIL) 25 mg tablet Take 1 Tablet by mouth in the morning. 7/25/22  Yes Tanner Babin MD   losartan (COZAAR) 100 mg tablet Take 1 Tablet by mouth in the morning. 7/25/22  Yes Thuy Babin MD   montelukast (SINGULAIR) 10 mg tablet Take 1 Tablet by mouth in the morning. 7/25/22  Yes Thuy Babin MD   cetirizine (ZYRTEC) 10 mg tablet TAKE 1 TABLET BY MOUTH DAILY 6/29/22  Yes Thuy Babin MD   fluticasone propionate (FLONASE) 50 mcg/actuation nasal spray SHAKE LIQUID AND USE 2 SPRAYS IN EACH NOSTRIL DAILY 4/19/22  Yes Godfrey Chiu MD   ibuprofen-famotidine 800-26.6 mg tab Duexis 800 mg-26.6 mg tablet   Yes Provider, Historical   albuterol (PROVENTIL VENTOLIN) 2.5 mg /3 mL (0.083 %) nebu 3 mL by Nebulization route every four (4) hours as needed for Wheezing or Shortness of Breath. 11/9/21  Yes Godfrey Chiu MD   fluticasone propion-salmeteroL (Advair Diskus) 250-50 mcg/dose diskus inhaler USE 1 INHALATION BY MOUTH EVERY 12 HOURS 9/28/21  Yes Thuy Babin MD   gabapentin (NEURONTIN) 600 mg tablet Take 600 mg by mouth three (3) times daily. Yes Provider, Historical   ocrelizumab (OCREVUS) 30 mg/mL soln injection by IntraVENous route. Yes Provider, Historical   topiramate (TOPAMAX) 50 mg tablet Take 1 Tablet by mouth two (2) times a day. Patient not taking: No sig reported 3/8/22   Godfrey Chiu MD   butalbital-acetaminophen-caffeine (FIORICET, ESGIC) -40 mg per tablet Take 1 Tab by mouth every six (6) hours as needed for Pain for up to 30 doses. Max 6 /24 hours  Patient not taking: Reported on 9/20/2022 6/23/18   Aditya Yeung MD     Allergies   Allergen Reactions    Tysabri [Natalizumab] Anaphylaxis         Objective:   Vital Signs: (As obtained by patient/caregiver at home)  There were no vitals taken for this visit.      Constitutional: [x] Appears well-developed and well-nourished [x] No apparent distress        Mental status: [x] Alert and awake  [x] Oriented to person/place/time [x] Able to follow commands      Eyes:   EOM    [x]  Normal      Sclera  [x]  Normal              Discharge [x]  None visible       HENT: [x] Normocephalic, atraumatic    [] Mouth/Throat: Mucous membranes are moist    External Ears [x] Normal      Neck: [x] No visualized mass     Pulmonary/Chest: [x] Respiratory effort normal   [x] No visualized signs of difficulty breathing or respiratory distress           Musculoskeletal:   [x] Normal gait with no signs of ataxia         [x] Normal range of motion of neck         Neurological:        [x] No Facial Asymmetry (Cranial nerve 7 motor function) (limited exam due to video visit)          [x] No gaze palsy              Skin:        [x] No significant exanthematous lesions or discoloration noted on facial skin                  Psychiatric:       [x] Normal Affect [] Abnormal -        [x] No Hallucinations      We discussed the expected course, resolution and complications of the diagnosis(es) in detail. Medication risks, benefits, costs, interactions, and alternatives were discussed as indicated. I advised her to contact the office if her condition worsens, changes or fails to improve as anticipated. She expressed understanding with the diagnosis(es) and plan. Josh Cisse is a 56 y.o. female being evaluated by a video visit encounter for concerns as above. A caregiver was present when appropriate. Due to this being a TeleHealth encounter (During [facility]-05 public health emergency), evaluation of the following organ systems was limited: Vitals/Constitutional/EENT/Resp/CV/GI//MS/Neuro/Skin/Heme-Lymph-Imm. Pursuant to the emergency declaration under the [facility], [facility] waiver authority and the [employer] Act, this Virtual  Visit was conducted, with patient's (and/or legal guardian's) consent, to reduce the patient's risk of exposure to COVID-19 and provide necessary medical care. Services were provided through a video synchronous discussion virtually to substitute for in-person clinic visit.    Patient and provider were located at their individual homes.         Kandice Bernal MD

## 2022-09-22 RX ORDER — ACETAMINOPHEN 325 MG/1
650 TABLET ORAL ONCE
Status: COMPLETED | OUTPATIENT
Start: 2022-09-29 | End: 2022-09-29

## 2022-09-22 RX ORDER — DIPHENHYDRAMINE HCL 25 MG
50 CAPSULE ORAL ONCE
Status: COMPLETED | OUTPATIENT
Start: 2022-09-29 | End: 2022-09-29

## 2022-09-26 ENCOUNTER — HOSPITAL ENCOUNTER (EMERGENCY)
Age: 47
Discharge: HOME | End: 2022-09-27
Attending: STUDENT IN AN ORGANIZED HEALTH CARE EDUCATION/TRAINING PROGRAM
Payer: COMMERCIAL

## 2022-09-26 ENCOUNTER — NURSE TRIAGE (OUTPATIENT)
Dept: OTHER | Facility: CLINIC | Age: 47
End: 2022-09-26

## 2022-09-26 ENCOUNTER — TELEPHONE (OUTPATIENT)
Dept: FAMILY MEDICINE CLINIC | Age: 47
End: 2022-09-26

## 2022-09-26 ENCOUNTER — APPOINTMENT (OUTPATIENT)
Dept: CT IMAGING | Age: 47
End: 2022-09-26
Attending: STUDENT IN AN ORGANIZED HEALTH CARE EDUCATION/TRAINING PROGRAM
Payer: COMMERCIAL

## 2022-09-26 DIAGNOSIS — R51.9 ACUTE NONINTRACTABLE HEADACHE, UNSPECIFIED HEADACHE TYPE: Primary | ICD-10-CM

## 2022-09-26 LAB
ALBUMIN SERPL-MCNC: 4 G/DL (ref 3.5–5)
ALBUMIN/GLOB SERPL: 1.2 {RATIO} (ref 1.1–2.2)
ALP SERPL-CCNC: 61 U/L (ref 45–117)
ALT SERPL-CCNC: 36 U/L (ref 12–78)
ANION GAP SERPL CALC-SCNC: 4 MMOL/L (ref 5–15)
AST SERPL-CCNC: 21 U/L (ref 15–37)
BASOPHILS # BLD: 0.1 K/UL (ref 0–0.1)
BASOPHILS NFR BLD: 1 % (ref 0–1)
BILIRUB SERPL-MCNC: 0.5 MG/DL (ref 0.2–1)
BUN SERPL-MCNC: 13 MG/DL (ref 6–20)
BUN/CREAT SERPL: 14 (ref 12–20)
CALCIUM SERPL-MCNC: 9.4 MG/DL (ref 8.5–10.1)
CHLORIDE SERPL-SCNC: 102 MMOL/L (ref 97–108)
CO2 SERPL-SCNC: 31 MMOL/L (ref 21–32)
CREAT SERPL-MCNC: 0.95 MG/DL (ref 0.55–1.02)
DIFFERENTIAL METHOD BLD: ABNORMAL
EOSINOPHIL # BLD: 0.2 K/UL (ref 0–0.4)
EOSINOPHIL NFR BLD: 2 % (ref 0–7)
ERYTHROCYTE [DISTWIDTH] IN BLOOD BY AUTOMATED COUNT: 12.6 % (ref 11.5–14.5)
GLOBULIN SER CALC-MCNC: 3.3 G/DL (ref 2–4)
GLUCOSE SERPL-MCNC: 86 MG/DL (ref 65–100)
HCG SERPL QL: NEGATIVE
HCT VFR BLD AUTO: 45.1 % (ref 35–47)
HGB BLD-MCNC: 15.1 G/DL (ref 11.5–16)
IMM GRANULOCYTES # BLD AUTO: 0 K/UL (ref 0–0.04)
IMM GRANULOCYTES NFR BLD AUTO: 0 % (ref 0–0.5)
LYMPHOCYTES # BLD: 3 K/UL (ref 0.8–3.5)
LYMPHOCYTES NFR BLD: 34 % (ref 12–49)
MCH RBC QN AUTO: 32.1 PG (ref 26–34)
MCHC RBC AUTO-ENTMCNC: 33.5 G/DL (ref 30–36.5)
MCV RBC AUTO: 96 FL (ref 80–99)
MONOCYTES # BLD: 0.8 K/UL (ref 0–1)
MONOCYTES NFR BLD: 9 % (ref 5–13)
NEUTS SEG # BLD: 4.6 K/UL (ref 1.8–8)
NEUTS SEG NFR BLD: 54 % (ref 32–75)
NRBC # BLD: 0 K/UL (ref 0–0.01)
NRBC BLD-RTO: 0 PER 100 WBC
PLATELET # BLD AUTO: 458 K/UL (ref 150–400)
PMV BLD AUTO: 9.4 FL (ref 8.9–12.9)
POTASSIUM SERPL-SCNC: 3.6 MMOL/L (ref 3.5–5.1)
PROT SERPL-MCNC: 7.3 G/DL (ref 6.4–8.2)
RBC # BLD AUTO: 4.7 M/UL (ref 3.8–5.2)
SODIUM SERPL-SCNC: 137 MMOL/L (ref 136–145)
WBC # BLD AUTO: 8.8 K/UL (ref 3.6–11)

## 2022-09-26 PROCEDURE — 36415 COLL VENOUS BLD VENIPUNCTURE: CPT

## 2022-09-26 PROCEDURE — 85025 COMPLETE CBC W/AUTO DIFF WBC: CPT

## 2022-09-26 PROCEDURE — 74011250636 HC RX REV CODE- 250/636: Performed by: STUDENT IN AN ORGANIZED HEALTH CARE EDUCATION/TRAINING PROGRAM

## 2022-09-26 PROCEDURE — 99284 EMERGENCY DEPT VISIT MOD MDM: CPT

## 2022-09-26 PROCEDURE — 93005 ELECTROCARDIOGRAM TRACING: CPT

## 2022-09-26 PROCEDURE — 96375 TX/PRO/DX INJ NEW DRUG ADDON: CPT

## 2022-09-26 PROCEDURE — 80053 COMPREHEN METABOLIC PANEL: CPT

## 2022-09-26 PROCEDURE — 70450 CT HEAD/BRAIN W/O DYE: CPT

## 2022-09-26 PROCEDURE — 96365 THER/PROPH/DIAG IV INF INIT: CPT

## 2022-09-26 PROCEDURE — 84703 CHORIONIC GONADOTROPIN ASSAY: CPT

## 2022-09-26 RX ORDER — DIPHENHYDRAMINE HYDROCHLORIDE 50 MG/ML
50 INJECTION, SOLUTION INTRAMUSCULAR; INTRAVENOUS
Status: COMPLETED | OUTPATIENT
Start: 2022-09-26 | End: 2022-09-26

## 2022-09-26 RX ORDER — KETOROLAC TROMETHAMINE 30 MG/ML
30 INJECTION, SOLUTION INTRAMUSCULAR; INTRAVENOUS
Status: COMPLETED | OUTPATIENT
Start: 2022-09-26 | End: 2022-09-26

## 2022-09-26 RX ORDER — MAGNESIUM SULFATE 1 G/100ML
1 INJECTION INTRAVENOUS ONCE
Status: COMPLETED | OUTPATIENT
Start: 2022-09-26 | End: 2022-09-26

## 2022-09-26 RX ORDER — PROCHLORPERAZINE EDISYLATE 5 MG/ML
10 INJECTION INTRAMUSCULAR; INTRAVENOUS ONCE
Status: COMPLETED | OUTPATIENT
Start: 2022-09-26 | End: 2022-09-26

## 2022-09-26 RX ADMIN — KETOROLAC TROMETHAMINE 30 MG: 30 INJECTION, SOLUTION INTRAMUSCULAR at 21:13

## 2022-09-26 RX ADMIN — MAGNESIUM SULFATE HEPTAHYDRATE 1 G: 1 INJECTION, SOLUTION INTRAVENOUS at 21:14

## 2022-09-26 RX ADMIN — PROCHLORPERAZINE EDISYLATE 10 MG: 5 INJECTION INTRAMUSCULAR; INTRAVENOUS at 19:09

## 2022-09-26 RX ADMIN — SODIUM CHLORIDE 1000 ML: 9 INJECTION, SOLUTION INTRAVENOUS at 21:12

## 2022-09-26 RX ADMIN — DIPHENHYDRAMINE HYDROCHLORIDE 50 MG: 50 INJECTION, SOLUTION INTRAMUSCULAR; INTRAVENOUS at 19:09

## 2022-09-26 NOTE — Clinical Note
[address]  [facility]ospit[facility] EMERGENCY DEPT  [address] [facility]  [place] [address] [phone]    Work/School Note    Date: 9/26/2022    To Whom It May concern:      Sharif Flores was seen and treated today in the emergency room by the following provider(s):  Attending Provider: Hannah Miranda MD  Physician: Graham Valladares DO. Sharif Flores is excused from work/school on 09/26/22. She is clear to return to work/school on 09/27/22.         Sincerely,          Carrie Pinon MD

## 2022-09-26 NOTE — TELEPHONE ENCOUNTER
Received call from Marya Fuller at [facility] with [employer] Complaint. Subjective: Caller states \"has a really bad headache, worst headache. May be related to BP. \"     Current Symptoms: is on BP medication, no missed doses. Headache started Friday. Last BP reading this morning was 142/91, after medication  Before medication was 152/108  Hard to focus on computer. No illness  BP now at 14:05 is 160/126. Eyes are aching. Onset: 3 days ago; gradual    Associated Symptoms: NA    Pain Severity: 7/10; severity has lessened    Temperature: denies    What has been tried: has taken extra BP medication    LMP: NA Pregnant: No    Recommended disposition: Go to ED Now    Care advice provided, patient verbalizes understanding; denies any other questions or concerns; instructed to call back for any new or worsening symptoms. Patient/caller agrees to proceed to   Emergency Department    Attention Provider: Thank you for allowing me to participate in the care of your patient. The patient was connected to triage in response to information provided to the [facility]. Please do not respond through this encounter as the response is not directed to a shared pool.     Reason for Disposition   Systolic BP >= 072 OR Diastolic >= 664, and any cardiac or neurologic symptoms (e.g., chest pain, difficulty breathing, unsteady gait, blurred vision)    Protocols used: Blood Pressure - High-ADULT-OH

## 2022-09-26 NOTE — TELEPHONE ENCOUNTER
Appointment Request From: Bea Santillan     With Provider: Terry Nice MD [facility] at [address]     Preferred Date Range: 9/26/2022 - 9/30/2022     Preferred Times: Any Time     Reason for visit: Request an Appointment     Comments:  Blood Pressure is high (having headaches)- need to increase meds

## 2022-09-27 VITALS
TEMPERATURE: 98.1 F | DIASTOLIC BLOOD PRESSURE: 78 MMHG | HEART RATE: 80 BPM | SYSTOLIC BLOOD PRESSURE: 115 MMHG | BODY MASS INDEX: 47.09 KG/M2 | RESPIRATION RATE: 16 BRPM | HEIGHT: 66 IN | WEIGHT: 293 LBS | OXYGEN SATURATION: 97 %

## 2022-09-27 NOTE — ED NOTES
Discussed discharge instructions with pt-state understanding. Pt remains AOX4, PWD, VSS, ambulatory with steady gait to waiting room.

## 2022-09-27 NOTE — ED NOTES
Pt back from CT, reports still having headache, hypertension noted on monitor. Pt just received medication right before going to CT, will re-evaluate pain-pt states understanding.

## 2022-09-27 NOTE — ED NOTES
Pt sleeping on arrival to room, reports feeling better, bp improved-see flowsheet. Pt denies any other needs.

## 2022-09-27 NOTE — ED NOTES
Pt resting, reports pain improved but remains 6/10-additional ordered medications started at this time. Pt denies any other needs, call light within reach, VSS.

## 2022-09-27 NOTE — ED PROVIDER NOTES
EMERGENCY DEPARTMENT HISTORY AND PHYSICAL EXAM      Date: 9/26/2022  Patient Name: Lori Hays    History of Presenting Illness     Chief Complaint   Patient presents with    Headache     Reports worsening headache x 3 days. Also reports higher than usual blood pressure readings, hx of HTN and has been compliant with bp meds. Denied blurred vision or dizziness. History Provided By: Patient    HPI: Lori Hays, 56 y.o. female with a past medical history significant for multiple sclerosis on medication, migraines, morbid obesity, hypertension, diabetes presents to the ED with cc of headache that has been intermittently present for about a week. She states that about once or twice a day her headaches last for a couple of hours. Today is present since 3:00 or so in the afternoon. She describes her headache as a sensation of started to the top of her head and then radiates down to the right side of her face. Headache the back of her head and neck as well. She has not had any fever or chills. She reports that her blood pressure readings at home have been slightly higher than she is used to. She initially denied any blurry vision or dizziness in triage, but when I talked to her she was slightly ambivalence and she was unsure. She has no difficulties with moving her neck. She does not describe any abrupt onset of the headache. There are no other complaints, changes, or physical findings at this time. PCP: Georgiana Lynch MD    No current facility-administered medications on file prior to encounter. Current Outpatient Medications on File Prior to Encounter   Medication Sig Dispense Refill    ibuprofen-phenylephrine (Sudafed PE Head Congestn-Pain) 200-10 mg tab Take  by mouth. amoxicillin-clavulanate (AUGMENTIN) 875-125 mg per tablet Take 1 Tablet by mouth two (2) times a day for 7 days.  14 Tablet 0    predniSONE (STERAPRED) 5 mg dose pack See administration instruction per 5mg dose pack 21 Tablet 0    omeprazole (PRILOSEC) 40 mg capsule TAKE 1 CAPSULE BY MOUTH DAILY 90 Capsule 0    tiZANidine (ZANAFLEX) 4 mg tablet       atorvastatin (LIPITOR) 10 mg tablet Take 1 Tablet by mouth in the morning. 90 Tablet 1    DULoxetine (CYMBALTA) 60 mg capsule Take 1 Capsule by mouth in the morning. 90 Capsule 1    ergocalciferol (ERGOCALCIFEROL) 1,250 mcg (50,000 unit) capsule Take 1 Capsule by mouth every seven (7) days. 12 Capsule 1    albuterol (PROVENTIL HFA, VENTOLIN HFA, PROAIR HFA) 90 mcg/actuation inhaler INHALE 2 PUFFS EVERY 4 HOURS AS NEEDED FOR WHEEZING OR SHORTNESS OF BREATH AND COUGH 18 g 2    hydroCHLOROthiazide (HYDRODIURIL) 25 mg tablet Take 1 Tablet by mouth in the morning. 90 Tablet 1    losartan (COZAAR) 100 mg tablet Take 1 Tablet by mouth in the morning. 90 Tablet 1    montelukast (SINGULAIR) 10 mg tablet Take 1 Tablet by mouth in the morning. 30 Tablet 5    cetirizine (ZYRTEC) 10 mg tablet TAKE 1 TABLET BY MOUTH DAILY 90 Tablet 0    fluticasone propionate (FLONASE) 50 mcg/actuation nasal spray SHAKE LIQUID AND USE 2 SPRAYS IN EACH NOSTRIL DAILY 48 g 1    topiramate (TOPAMAX) 50 mg tablet Take 1 Tablet by mouth two (2) times a day. (Patient not taking: No sig reported) 180 Tablet 1    ibuprofen-famotidine 800-26.6 mg tab Duexis 800 mg-26.6 mg tablet      albuterol (PROVENTIL VENTOLIN) 2.5 mg /3 mL (0.083 %) nebu 3 mL by Nebulization route every four (4) hours as needed for Wheezing or Shortness of Breath. 200 Each 0    fluticasone propion-salmeteroL (Advair Diskus) 250-50 mcg/dose diskus inhaler USE 1 INHALATION BY MOUTH EVERY 12 HOURS 60 Each 2    gabapentin (NEURONTIN) 600 mg tablet Take 600 mg by mouth three (3) times daily. ocrelizumab (OCREVUS) 30 mg/mL soln injection by IntraVENous route. butalbital-acetaminophen-caffeine (FIORICET, ESGIC) -40 mg per tablet Take 1 Tab by mouth every six (6) hours as needed for Pain for up to 30 doses.  Max 6 /24 hours (Patient not taking: Reported on 2022) 30 Tab 0       Past History     Past Medical History:  Past Medical History:   Diagnosis Date    Autoimmune disease ([facility] [place] [facility] 10/1/2008    Multiple Sclerosis    BMI 45.0-49.9, adult ([facility] [place] [facility] 2018    Chronic pain 2016    Lumbar back pain    Depression 2020    Seems to come and go    Essential hypertension 2018    Gastroesophageal reflux disease without esophagitis 2017    H/O tubal ligation 2017    Iron deficiency anemia due to chronic blood loss 2017    Menorrhagia with regular cycle 2017    Migraine without aura and without status migrainosus, not intractable 2017    Mild intermittent asthma without complication     MS (multiple sclerosis) ([facility] [place] [facility] 10/2008    using clinical trial medications    Obesity, Class III, BMI 40-49.9 (morbid obesity) ([facility] [place] [facility] 2015    S/P endometrial ablation 2018    Vitamin D deficiency 2017       Past Surgical History:  Past Surgical History:   Procedure Laterality Date    HX  SECTION      x3; , , &     NEUROLOGICAL PROCEDURE UNLISTED  10/1/2008    Multiple Sclerosis    RI LAP,TUBAL CAUTERY         Family History:  Family History   Problem Relation Age of Onset    Diabetes Mother     Hypertension Mother     OSTEOARTHRITIS Mother     Alcohol abuse Father         Has been clean for over 36 years now    Diabetes Father     OSTEOARTHRITIS Maternal Grandmother     Cancer Maternal Uncle         Brain cancer       Social History:  Social History     Tobacco Use    Smoking status: Never    Smokeless tobacco: Never   Vaping Use    Vaping Use: Never used   Substance Use Topics    Alcohol use: Yes     Alcohol/week: 3.0 standard drinks     Types: 1 Glasses of wine, 1 Shots of liquor, 1 Drinks containing 0.5 oz of alcohol per week     Comment: Socially- maybe 2 times a month    Drug use: No       Allergies:   Allergies   Allergen Reactions    Tysabri [Natalizumab] Anaphylaxis Review of Systems   Review of Systems   Constitutional:  Negative for activity change, chills and fever. HENT:  Negative for sore throat. Eyes:  Positive for visual disturbance. Respiratory:  Negative for shortness of breath. Cardiovascular:  Negative for chest pain. Gastrointestinal:  Negative for abdominal pain, nausea and vomiting. Genitourinary:  Negative for difficulty urinating, dysuria and hematuria. Musculoskeletal:  Negative for myalgias. Skin:  Negative for color change. Neurological:  Positive for headaches. Negative for dizziness and weakness. Psychiatric/Behavioral:  Negative for agitation. Physical Exam   Physical Exam  Constitutional:       Appearance: Normal appearance. HENT:      Head: Normocephalic and atraumatic. Mouth/Throat:      Mouth: Mucous membranes are moist.   Eyes:      General: No scleral icterus. Right eye: No discharge. Left eye: No discharge. Extraocular Movements: Extraocular movements intact. Pupils: Pupils are equal, round, and reactive to light. Comments: No internuclear ophthalmoplegia   Cardiovascular:      Rate and Rhythm: Normal rate and regular rhythm. Heart sounds: Normal heart sounds. Pulmonary:      Effort: Pulmonary effort is normal.      Breath sounds: Normal breath sounds. Abdominal:      General: Abdomen is flat. Bowel sounds are normal.   Musculoskeletal:         General: Normal range of motion. Cervical back: Normal range of motion and neck supple. Skin:     General: Skin is warm and dry. Capillary Refill: Capillary refill takes less than 2 seconds. Neurological:      General: No focal deficit present. Mental Status: She is alert and oriented to person, place, and time. Cranial Nerves: No cranial nerve deficit. Sensory: No sensory deficit. Motor: No weakness.       Coordination: Coordination normal.       Diagnostic Study Results     Labs -     Recent Results (from the past 24 hour(s))   EKG, 12 LEAD, INITIAL    Collection Time: 09/26/22  3:57 PM   Result Value Ref Range    Ventricular Rate 71 BPM    Atrial Rate 71 BPM    P-R Interval 146 ms    QRS Duration 82 ms    Q-T Interval 416 ms    QTC Calculation (Bezet) 452 ms    Calculated P Axis 48 degrees    Calculated R Axis -17 degrees    Calculated T Axis -15 degrees    Diagnosis       Normal sinus rhythm  Nonspecific T wave abnormality  When compared with ECG of 21-JUN-2018 16:59,  premature ventricular complexes are no longer present  Nonspecific T wave abnormality now evident in Anterolateral leads     CBC WITH AUTOMATED DIFF    Collection Time: 09/26/22  4:06 PM   Result Value Ref Range    WBC 8.8 3.6 - 11.0 K/uL    RBC 4.70 3.80 - 5.20 M/uL    HGB 15.1 11.5 - 16.0 g/dL    HCT 45.1 35.0 - 47.0 %    MCV 96.0 80.0 - 99.0 FL    MCH 32.1 26.0 - 34.0 PG    MCHC 33.5 30.0 - 36.5 g/dL    RDW 12.6 11.5 - 14.5 %    PLATELET 195 (H) 463 - 400 K/uL    MPV 9.4 8.9 - 12.9 FL    NRBC 0.0 0  WBC    ABSOLUTE NRBC 0.00 0.00 - 0.01 K/uL    NEUTROPHILS 54 32 - 75 %    LYMPHOCYTES 34 12 - 49 %    MONOCYTES 9 5 - 13 %    EOSINOPHILS 2 0 - 7 %    BASOPHILS 1 0 - 1 %    IMMATURE GRANULOCYTES 0 0.0 - 0.5 %    ABS. NEUTROPHILS 4.6 1.8 - 8.0 K/UL    ABS. LYMPHOCYTES 3.0 0.8 - 3.5 K/UL    ABS. MONOCYTES 0.8 0.0 - 1.0 K/UL    ABS. EOSINOPHILS 0.2 0.0 - 0.4 K/UL    ABS. BASOPHILS 0.1 0.0 - 0.1 K/UL    ABS. IMM.  GRANS. 0.0 0.00 - 0.04 K/UL    DF AUTOMATED     METABOLIC PANEL, COMPREHENSIVE    Collection Time: 09/26/22  4:06 PM   Result Value Ref Range    Sodium 137 136 - 145 mmol/L    Potassium 3.6 3.5 - 5.1 mmol/L    Chloride 102 97 - 108 mmol/L    CO2 31 21 - 32 mmol/L    Anion gap 4 (L) 5 - 15 mmol/L    Glucose 86 65 - 100 mg/dL    BUN 13 6 - 20 MG/DL    Creatinine 0.95 0.55 - 1.02 MG/DL    BUN/Creatinine ratio 14 12 - 20      GFR est AA >60 >60 ml/min/1.73m2    GFR est non-AA >60 >60 ml/min/1.73m2    Calcium 9.4 8.5 - 10.1 MG/DL Bilirubin, total 0.5 0.2 - 1.0 MG/DL    ALT (SGPT) 36 12 - 78 U/L    AST (SGOT) 21 15 - 37 U/L    Alk. phosphatase 61 45 - 117 U/L    Protein, total 7.3 6.4 - 8.2 g/dL    Albumin 4.0 3.5 - 5.0 g/dL    Globulin 3.3 2.0 - 4.0 g/dL    A-G Ratio 1.2 1.1 - 2.2     HCG QL SERUM    Collection Time: 09/26/22  4:06 PM   Result Value Ref Range    HCG, Ql. Negative NEG         Radiologic Studies -   CT HEAD WO CONT   Final Result   No acute intracranial abnormality. Moderate periventricular hypodensities   consistent with patient's history of multiple sclerosis. CT Results  (Last 48 hours)                 09/26/22 1925  CT HEAD WO CONT Final result    Impression:  No acute intracranial abnormality. Moderate periventricular hypodensities   consistent with patient's history of multiple sclerosis. Narrative:  EXAM: CT HEAD WO CONT       INDICATION: headache       COMPARISON: MRI of the brain dated 3/6/2014. CONTRAST: None. TECHNIQUE: Unenhanced CT of the head was performed using 5 mm images. Brain and   bone windows were generated. Coronal and sagittal reformats. CT dose reduction   was achieved through use of a standardized protocol tailored for this   examination and automatic exposure control for dose modulation. FINDINGS:   The ventricles and sulci are normal in size, shape and configuration. . Moderate   periventricular echodensities. .. There is no intracranial hemorrhage,   extra-axial collection, or mass effect. The basilar cisterns are open. No CT   evidence of acute infarct. The bone windows demonstrate no abnormalities. The visualized portions of the   paranasal sinuses and mastoid air cells are clear. CXR Results  (Last 48 hours)      None          Medical Decision Making   I am the first provider for this patient. I reviewed the vital signs, available nursing notes, past medical history, past surgical history, family history and social history.     Vital Signs-Reviewed the patient's vital signs. Patient Vitals for the past 12 hrs:   BP SpO2   09/26/22 2223 115/78 97 %       Records Reviewed: Nursing records and medical records reviewed    Provider Notes (Medical Decision Making):   Differential diagnosis: Migraine, tension headache, multiple sclerosis, IIH    Patient presents emergency department with acute onset intermittent headache. Patient does report that she has had some questionable vision changes. Patient is obese. Differential diagnosis relatively broad for headache in thus patient with obesity and MS. Could be migraine with description of mostly unilateral pain and associated nausea. I doubt MS exacerbation as if she has no numbness, tingling, weakness or any other symptoms other than her headache. IIH possible with patient's obesity. Doubt SAH with no thunderclap component. No OCPs to suggest CVT. No AMS, fever, or meningismus to suggest meningoencephalitis. Neuro exam unremarkable - doubt CVA. No dysuria; doubt infection and no need to check UA.    - CT head  - IV compazine and IV benadryl    Patient reassessed at 2030: she is still slightly somnolent from her compazine and benadryl. She states that she is a bit dizzy after the medications. States headache improved, but still present. Labs unremarkable. CT imaging with note of moderate periventricular hypo densities which are consistent with hx of MS. Will give patient additional components of headache cocktail with IV toradol, IV magnesium, and IVF bolus. Will sign out patient to oncoming provider, Dr. Christina Thompson. If headache resolved patient can be discharged with plans for neurology follow up with Dr. Kadie Elliott. ED Course:   Initial assessment performed. The patients presenting problems have been discussed, and they are in agreement with the care plan formulated and outlined with them. I have encouraged them to ask questions as they arise throughout their visit.     ED Course as of 09/27/22 1013   Mon Sep 26, 2022   2046 Patient signed out by Dr. Ann Ellis [AR]   [phone] Patient pain free. Will discharge per Dr. Lobato Sender [AR]      ED Course User Index  [AR] Maribeth Landon,        Medications Administered       diphenhydrAMINE (BENADRYL) injection 50 mg       Admin Date  09/26/2022 Action  Given Dose  50 mg Route  IntraVENous Administered By  Josh Andujar RN              prochlorperazine (COMPAZINE) injection 10 mg       Admin Date  09/26/2022 Action  Given Dose  10 mg Route  IntraVENous Administered By  Josh Andujar RN                  Critical Care:  None      Disposition:  Home    DISCHARGE PLAN:  1. Discharge Medication List as of 9/26/2022 10:30 PM        2. Follow-up Information       Follow up With Specialties Details Why Contact Info    [facility] EMERGENCY DEPT Emergency Medicine  As needed, If symptoms worsen [address] [facility]    Malik Loaiza MD Neurology Schedule an appointment as soon as possible for a visit   [id number] [facility]  [phone]            3. Return to ED if worse     Diagnosis     Clinical Impression:   1. Acute nonintractable headache, unspecified headache type        Attestations:    Nadia Cooper MD    Please note that this dictation was completed with [employer], the computer voice recognition software. Quite often unanticipated grammatical, syntax, homophones, and other interpretive errors are inadvertently transcribed by the computer software. Please disregard these errors. Please excuse any errors that have escaped final proofreading. Thank you.

## 2022-09-27 NOTE — ED NOTES
Pt sleeping on arrival to room, wakes to voice. Magnesium infusion complete, approx 100ml of IV fluids to be completed. Pt reports feeling better, pain is 0/10 currently, provider updated. VSS.

## 2022-09-28 ENCOUNTER — OFFICE VISIT (OUTPATIENT)
Dept: FAMILY MEDICINE CLINIC | Age: 47
End: 2022-09-28
Payer: COMMERCIAL

## 2022-09-28 VITALS
WEIGHT: 293 LBS | BODY MASS INDEX: 47.09 KG/M2 | SYSTOLIC BLOOD PRESSURE: 150 MMHG | OXYGEN SATURATION: 93 % | HEIGHT: 66 IN | DIASTOLIC BLOOD PRESSURE: 109 MMHG | HEART RATE: 85 BPM | RESPIRATION RATE: 16 BRPM | TEMPERATURE: 97.3 F

## 2022-09-28 DIAGNOSIS — G43.009 MIGRAINE WITHOUT AURA AND WITHOUT STATUS MIGRAINOSUS, NOT INTRACTABLE: ICD-10-CM

## 2022-09-28 DIAGNOSIS — I10 PRIMARY HYPERTENSION: Primary | ICD-10-CM

## 2022-09-28 DIAGNOSIS — Z79.899 ENCOUNTER FOR LONG-TERM (CURRENT) USE OF MEDICATIONS: ICD-10-CM

## 2022-09-28 DIAGNOSIS — E66.01 MORBID OBESITY (HCC): ICD-10-CM

## 2022-09-28 DIAGNOSIS — Z71.3 WEIGHT LOSS COUNSELING, ENCOUNTER FOR: ICD-10-CM

## 2022-09-28 LAB
ATRIAL RATE: 71 BPM
CALCULATED P AXIS, ECG09: 48 DEGREES
CALCULATED R AXIS, ECG10: -17 DEGREES
CALCULATED T AXIS, ECG11: -15 DEGREES
DIAGNOSIS, 93000: NORMAL
P-R INTERVAL, ECG05: 146 MS
Q-T INTERVAL, ECG07: 416 MS
QRS DURATION, ECG06: 82 MS
QTC CALCULATION (BEZET), ECG08: 452 MS
VENTRICULAR RATE, ECG03: 71 BPM

## 2022-09-28 PROCEDURE — 99214 OFFICE O/P EST MOD 30 MIN: CPT | Performed by: FAMILY MEDICINE

## 2022-09-28 RX ORDER — TOPIRAMATE 50 MG/1
50 TABLET, FILM COATED ORAL
Qty: 90 TABLET | Refills: 0
Start: 2022-09-28 | End: 2022-10-19

## 2022-09-28 RX ORDER — PROPRANOLOL HYDROCHLORIDE 60 MG/1
60 CAPSULE, EXTENDED RELEASE ORAL DAILY
Qty: 30 CAPSULE | Refills: 1 | Status: SHIPPED | OUTPATIENT
Start: 2022-09-28 | End: 2022-10-21

## 2022-09-28 RX ORDER — BUTALBITAL, ACETAMINOPHEN AND CAFFEINE 50; 325; 40 MG/1; MG/1; MG/1
1 TABLET ORAL
Qty: 30 TABLET | Refills: 0 | Status: SHIPPED | OUTPATIENT
Start: 2022-09-28

## 2022-09-28 NOTE — PROGRESS NOTES
Consent: Valerie Grant, who was seen by synchronous (real-time) audio-video technology, and/or her healthcare decision maker, is aware that this patient-initiated, Telehealth encounter on 9/28/2022 is a billable service, with coverage as determined by her insurance carrier. She is aware that she may receive a bill and has provided verbal consent to proceed: Yes. Valerie Grant is a 56 y.o. female,     She forgot to do labs again, this time it's been 13 months now. Discussed need to do it today, unable to more refill next time    Vaccinated for COVID      S/p tubal ligation     has a past medical history of Autoimmune disease ([facility] (10/1/2008), BMI 45.0-49.9, adult ([facility] (02/14/2018), Chronic pain (9/1/2016), Depression (09/01/2020), Essential hypertension (04/17/2018), Gastroesophageal reflux disease without esophagitis (03/16/2017), H/O tubal ligation (03/16/2017), Iron deficiency anemia due to chronic blood loss (03/16/2017), Menorrhagia with regular cycle (04/03/2017), Migraine without aura and without status migrainosus, not intractable (03/16/2017), Mild intermittent asthma without complication (54/50/2911), MS (multiple sclerosis) ([facility] (10/2008), Obesity, Class III, BMI 40-49.9 (morbid obesity) ([facility] (12/03/2015), S/P endometrial ablation (04/16/2018), and Vitamin D deficiency (03/16/2017). HTN: hypertensive urgency today, pt report have been high this past month  HCTZ, losartan  Add on Propranolol LA 60mg    depression and anxiety  Mood has been good  Denies SI or HI. Cymbalta helps with pain. Also helped with her mood. She feels happy. Continue cymbalta 60mg. Asthma: mild-moderate persistent w/o complication  Advair, albuterol    GARRY have mouth guard not been compliant    HLD: LDL worsen  LIpitor 10mg      Weight loss counseling:   BMI 51  She's been on topamax for 6 months. Does help with appetite, eats only once but still gaining weight. Discussed calories count, goal for 1400cal/day. Exercise. Choice of food. last visit we increased topamax 50mg bid - does help, appetite lessen - but affected her memory so she have stopped it. We'll go back lower dose of 25mg qhs for both weight loss and headache. Referred to gastric bypass. She never did call. Again reminded need to see us 3X for letter or support. GERD: prilosec 40mg    GARRY on CPAP    Chronic low back pain seeing pain specialist, had steroid injection. S/p nerve ablation on one side. So still have burning on the other side. Gabapentin 300mg written by Orthopedic Dr. Michelle Mina. She up it herself to BID. Saw Dr. Michelle Mina recently, 7/20/2020  Woody Michaud to pain specialist had a steroid injection, nerve block just 2 days ago. Gabapentin increased to 600mg 4X daily. She's been in PT for 4 weeks. Neuro at [facility]  Migraine HA  MS managed               Gabapentin filled by her Neurologist     Saw OBGYN: had papsmear there 03/2022  Mammogram done 12/2021    Colonoscopy 12/2021, request medical record    She wants derm for general \"age related skin, wrinkle\", etc... Reviewed: active problem list, medication list, allergies, notes from last encounter, lab results    A comprehensive review of systems was negative except for that written in the HPI. Assessment & Plan:   Diagnoses and all orders for this visit:    1. Primary hypertension  -     propranolol LA (INDERAL LA) 60 mg SR capsule; Take 1 Capsule by mouth daily. 2. Weight loss counseling, encounter for  -     REFERRAL TO GENERAL SURGERY    3. Morbid obesity ([address]  -     topiramate (TOPAMAX) 50 mg tablet; Take 1 Tablet by mouth nightly. 4. BMI 50.0-59.9, adult ([facility])  -     REFERRAL TO GENERAL SURGERY    5. Migraine without aura and without status migrainosus, not intractable  -     propranolol LA (INDERAL LA) 60 mg SR capsule; Take 1 Capsule by mouth daily.   - butalbital-acetaminophen-caffeine (FIORICET, ESGIC) -40 mg per tablet; Take 1 Tablet by mouth every six (6) hours as needed for Migraine for up to 30 doses. Max 6 /24 hours    6. Encounter for long-term (current) use of medications        Follow-up and Dispositions    Return in about 3 weeks (around 10/19/2022) for HTN, headache. 712  Subjective:   Sharif Flores is a 56 y.o. female who was seen for Hypertension (ED follow up for migraines and headach)      Prior to Admission medications    Medication Sig Start Date End Date Taking? Authorizing Provider   propranolol LA (INDERAL LA) 60 mg SR capsule Take 1 Capsule by mouth daily. 9/28/22  Yes Godfrey Chiu MD   topiramate (TOPAMAX) 50 mg tablet Take 1 Tablet by mouth nightly. 9/28/22  Yes Godfrey Chiu MD   butalbital-acetaminophen-caffeine (FIORICET, ESGIC) -40 mg per tablet Take 1 Tablet by mouth every six (6) hours as needed for Migraine for up to 30 doses. Max 6 /24 hours 9/28/22  Yes Thuy Babin MD   ibuprofen-phenylephrine (Sudafed PE Head Congestn-Pain) 200-10 mg tab Take  by mouth.    Yes Provider, Historical   omeprazole (PRILOSEC) 40 mg capsule TAKE 1 CAPSULE BY MOUTH DAILY 8/26/22  Yes Thuy Babin MD   tiZANidine (ZANAFLEX) 4 mg tablet  5/19/22  Yes Provider, Historical   atorvastatin (LIPITOR) 10 mg tablet Take 1 Tablet by mouth in the morning. 7/25/22  Yes Thuy Babin MD   DULoxetine (CYMBALTA) 60 mg capsule Take 1 Capsule by mouth in the morning. 7/25/22  Yes Thuy Babin MD   albuterol (PROVENTIL HFA, VENTOLIN HFA, PROAIR HFA) 90 mcg/actuation inhaler INHALE 2 PUFFS EVERY 4 HOURS AS NEEDED FOR WHEEZING OR SHORTNESS OF BREATH AND COUGH 7/25/22  Yes Godfrey Chiu MD   hydroCHLOROthiazide (HYDRODIURIL) 25 mg tablet Take 1 Tablet by mouth in the morning. 7/25/22  Yes Godfrey Chiu MD   losartan (COZAAR) 100 mg tablet Take 1 Tablet by mouth in the morning. 7/25/22  Yes Godfrey Chiu MD   montelukast (SINGULAIR) 10 mg tablet Take 1 Tablet by mouth in the morning. 7/25/22  Yes Reynaldo Babin MD   cetirizine (ZYRTEC) 10 mg tablet TAKE 1 TABLET BY MOUTH DAILY 6/29/22  Yes Reynaldo Babin MD   fluticasone propionate (FLONASE) 50 mcg/actuation nasal spray SHAKE LIQUID AND USE 2 SPRAYS IN EACH NOSTRIL DAILY 4/19/22  Yes Nazario Jerez MD   ibuprofen-famotidine 800-26.6 mg tab Duexis 800 mg-26.6 mg tablet   Yes Provider, Historical   albuterol (PROVENTIL VENTOLIN) 2.5 mg /3 mL (0.083 %) nebu 3 mL by Nebulization route every four (4) hours as needed for Wheezing or Shortness of Breath. 11/9/21  Yes Nazario Jerez MD   fluticasone propion-salmeteroL (Advair Diskus) 250-50 mcg/dose diskus inhaler USE 1 INHALATION BY MOUTH EVERY 12 HOURS 9/28/21  Yes Reynaldo Babin MD   gabapentin (NEURONTIN) 600 mg tablet Take 600 mg by mouth three (3) times daily. Yes Provider, Historical   ocrelizumab (OCREVUS) 30 mg/mL soln injection by IntraVENous route. Yes Provider, Historical   ergocalciferol (ERGOCALCIFEROL) 1,250 mcg (50,000 unit) capsule Take 1 Capsule by mouth every seven (7) days.  7/25/22   Nazario Jerez MD     Allergies   Allergen Reactions    Tysabri [Natalizumab] Anaphylaxis       Patient Active Problem List   Diagnosis Code    Multiple sclerosis ([facility] G35    Heavy menstrual period N92.0    Anemia D64.9    Obesity, Class III, BMI 40-49.9 (morbid obesity) (HCC) E66.01    Mild intermittent asthma without complication S74.87    Vitamin D deficiency E55.9    Iron deficiency anemia due to chronic blood loss D50.0    Migraine without aura and without status migrainosus, not intractable G43.009    H/O tubal ligation Z98.51    Gastroesophageal reflux disease without esophagitis K21.9    Menorrhagia with regular cycle N92.0    BMI 45.0-49.9, adult ([facility] Z68.42    S/P endometrial ablation Z98.890    Essential hypertension I10    Acute relapsing multiple sclerosis ([facility] G35    Encounter for long-term (current) use of medications Z79.899 Depression, major, recurrent, mild (HCC) F33.0     Past Medical History:   Diagnosis Date    Autoimmune disease ([facility] 10/1/2008    Multiple Sclerosis    BMI 45.0-49.9, adult ([facility] 2018    Chronic pain 2016    Lumbar back pain    Depression 2020    Seems to come and go    Essential hypertension 2018    Gastroesophageal reflux disease without esophagitis 2017    H/O tubal ligation 2017    Iron deficiency anemia due to chronic blood loss 2017    Menorrhagia with regular cycle 2017    Migraine without aura and without status migrainosus, not intractable 2017    Mild intermittent asthma without complication     MS (multiple sclerosis) ([facility] 10/2008    using clinical trial medications    Obesity, Class III, BMI 40-49.9 (morbid obesity) ([facility] 2015    S/P endometrial ablation 2018    Vitamin D deficiency 2017     Past Surgical History:   Procedure Laterality Date    HX  SECTION      x3; , , &     NEUROLOGICAL PROCEDURE UNLISTED  10/1/2008    Multiple Sclerosis    ME LAP,TUBAL CAUTERY       Objective:   Vital Signs: (As obtained by patient/caregiver at home)  Visit Vitals  BP (!) 150/109 (BP 1 Location: Left upper arm, BP Patient Position: Sitting, BP Cuff Size: Adult long)   Pulse 85   Temp 97.3 °F (36.3 °C) (Temporal)   Resp 16   Ht 5' 6\" (1.676 m)   Wt 322 lb (146.1 kg)   SpO2 93%   BMI 51.97 kg/m²        Constitutional: [x] Appears well-developed and well-nourished [x] No apparent distress        Mental status: [x] Alert and awake  [x] Oriented to person/place/time [x] Able to follow commands      Eyes:   EOM    [x]  Normal      Sclera  [x]  Normal              Discharge [x]  None visible       HENT: [x] Normocephalic, atraumatic    [] Mouth/Throat: Mucous membranes are moist    External Ears [x] Normal      Neck: [x] No visualized mass     Pulmonary/Chest: [x] Respiratory effort normal   [x] No visualized signs of difficulty breathing or respiratory distress           Musculoskeletal:   [x] Normal gait with no signs of ataxia         [x] Normal range of motion of neck         Neurological:        [x] No Facial Asymmetry (Cranial nerve 7 motor function) (limited exam due to video visit)          [x] No gaze palsy              Skin:        [x] No significant exanthematous lesions or discoloration noted on facial skin                  Psychiatric:       [x] Normal Affect [] Abnormal -        [x] No Hallucinations      We discussed the expected course, resolution and complications of the diagnosis(es) in detail. Medication risks, benefits, costs, interactions, and alternatives were discussed as indicated. I advised her to contact the office if her condition worsens, changes or fails to improve as anticipated. She expressed understanding with the diagnosis(es) and plan. Asia Lozano is a 56 y.o. female being evaluated by a video visit encounter for concerns as above. A caregiver was present when appropriate. Due to this being a TeleHealth encounter (During HZAMZ-34 public health emergency), evaluation of the following organ systems was limited: Vitals/Constitutional/EENT/Resp/CV/GI//MS/Neuro/Skin/Heme-Lymph-Imm. Pursuant to the emergency declaration under the [facility] waiver authority and the [employer] Act, this Virtual  Visit was conducted, with patient's (and/or legal guardian's) consent, to reduce the patient's risk of exposure to COVID-19 and provide necessary medical care. Services were provided through a video synchronous discussion virtually to substitute for in-person clinic visit. Patient and provider were located at their individual homes.       Antonio Grace MD

## 2022-09-28 NOTE — PROGRESS NOTES
David Shi is a 56 y.o. female  Chief Complaint   Patient presents with    Hypertension     ED follow up for migraines and headach     1. Have you been to the ER,  no urgent care clinic since your last visit? no Hospitalized since your last visit? 2. Have you seen or consulted any other health care providers outside of the [address] [place] [address] since your last visit?  no Include any pap smears or colon screening.  no

## 2022-09-29 ENCOUNTER — HOSPITAL ENCOUNTER (OUTPATIENT)
Dept: INFUSION THERAPY | Age: 47
Discharge: HOME | End: 2022-09-29
Payer: COMMERCIAL

## 2022-09-29 VITALS
SYSTOLIC BLOOD PRESSURE: 109 MMHG | HEART RATE: 61 BPM | BODY MASS INDEX: 52.12 KG/M2 | OXYGEN SATURATION: 93 % | TEMPERATURE: 97.6 F | WEIGHT: 293 LBS | DIASTOLIC BLOOD PRESSURE: 64 MMHG | RESPIRATION RATE: 18 BRPM

## 2022-09-29 PROCEDURE — 96375 TX/PRO/DX INJ NEW DRUG ADDON: CPT

## 2022-09-29 PROCEDURE — 74011250636 HC RX REV CODE- 250/636: Performed by: PSYCHIATRY & NEUROLOGY

## 2022-09-29 PROCEDURE — 96413 CHEMO IV INFUSION 1 HR: CPT

## 2022-09-29 PROCEDURE — 74011250637 HC RX REV CODE- 250/637: Performed by: PSYCHIATRY & NEUROLOGY

## 2022-09-29 PROCEDURE — 96415 CHEMO IV INFUSION ADDL HR: CPT

## 2022-09-29 PROCEDURE — 74011000250 HC RX REV CODE- 250: Performed by: PSYCHIATRY & NEUROLOGY

## 2022-09-29 RX ORDER — SODIUM CHLORIDE 9 MG/ML
25 INJECTION, SOLUTION INTRAVENOUS CONTINUOUS
Status: DISCONTINUED | OUTPATIENT
Start: 2022-09-29 | End: 2022-09-30 | Stop reason: HOSPADM

## 2022-09-29 RX ORDER — SODIUM CHLORIDE 0.9 % (FLUSH) 0.9 %
10-40 SYRINGE (ML) INJECTION AS NEEDED
Status: DISCONTINUED | OUTPATIENT
Start: 2022-09-29 | End: 2022-09-30 | Stop reason: HOSPADM

## 2022-09-29 RX ADMIN — ACETAMINOPHEN 650 MG: 325 TABLET ORAL at 09:37

## 2022-09-29 RX ADMIN — SODIUM CHLORIDE, PRESERVATIVE FREE 10 ML: 5 INJECTION INTRAVENOUS at 12:30

## 2022-09-29 RX ADMIN — SODIUM CHLORIDE 25 ML/HR: 9 INJECTION, SOLUTION INTRAVENOUS at 09:41

## 2022-09-29 RX ADMIN — DIPHENHYDRAMINE HYDROCHLORIDE 50 MG: 25 CAPSULE ORAL at 09:37

## 2022-09-29 RX ADMIN — METHYLPREDNISOLONE SODIUM SUCCINATE 125 MG: 125 INJECTION, POWDER, FOR SOLUTION INTRAMUSCULAR; INTRAVENOUS at 09:42

## 2022-09-29 RX ADMIN — OCRELIZUMAB 600 MG: 300 INJECTION INTRAVENOUS at 10:17

## 2022-09-29 RX ADMIN — SODIUM CHLORIDE, PRESERVATIVE FREE 10 ML: 5 INJECTION INTRAVENOUS at 09:38

## 2022-10-19 ENCOUNTER — VIRTUAL VISIT (OUTPATIENT)
Dept: FAMILY MEDICINE CLINIC | Age: 47
End: 2022-10-19
Payer: COMMERCIAL

## 2022-10-19 DIAGNOSIS — I10 PRIMARY HYPERTENSION: Primary | ICD-10-CM

## 2022-10-19 DIAGNOSIS — Z91.14 NONCOMPLIANCE WITH CPAP TREATMENT: ICD-10-CM

## 2022-10-19 DIAGNOSIS — G43.009 MIGRAINE WITHOUT AURA AND WITHOUT STATUS MIGRAINOSUS, NOT INTRACTABLE: ICD-10-CM

## 2022-10-19 DIAGNOSIS — G47.33 OSA (OBSTRUCTIVE SLEEP APNEA): ICD-10-CM

## 2022-10-19 PROCEDURE — 99213 OFFICE O/P EST LOW 20 MIN: CPT | Performed by: FAMILY MEDICINE

## 2022-10-19 NOTE — PROGRESS NOTES
Chief Complaint   Patient presents with    Hypertension    Headache     3 week check    1. Have you been to the ER, urgent care clinic since your last visit? Hospitalized since your last visit? No    2. Have you seen or consulted any other health care providers outside of the [address] [place] [address] since your last visit? Include any pap smears or colon screening.  No

## 2022-10-19 NOTE — PROGRESS NOTES
Consent: Shae Powell, who was seen by synchronous (real-time) audio-video technology, and/or her healthcare decision maker, is aware that this patient-initiated, Telehealth encounter on 10/19/2022 is a billable service, with coverage as determined by her insurance carrier. She is aware that she may receive a bill and has provided verbal consent to proceed: Yes. Shae Powell is a 56 y.o. female       has a past medical history of Autoimmune disease ([facility] (10/1/2008), BMI 45.0-49.9, adult ([facility] (02/14/2018), Chronic pain (9/1/2016), Depression (09/01/2020), Essential hypertension (04/17/2018), Gastroesophageal reflux disease without esophagitis (03/16/2017), H/O tubal ligation (03/16/2017), Iron deficiency anemia due to chronic blood loss (03/16/2017), Menorrhagia with regular cycle (04/03/2017), Migraine without aura and without status migrainosus, not intractable (03/16/2017), Mild intermittent asthma without complication (88/72/8518), MS (multiple sclerosis) ([facility] (10/2008), Obesity, Class III, BMI 40-49.9 (morbid obesity) ([facility] (12/03/2015), S/P endometrial ablation (04/16/2018), and Vitamin D deficiency (03/16/2017). HTN: hypertension, at home BP have been improving but still not at goal  HCTZ, losartan  Last visit added Propranolol (for both BP and headache) LA 60mg pulse have been in the 60s     HA is less frequent and sever since adding on propranolol, but still occurs. GARRY on CPAP - but have been noncompliant b/c she forgets to   She admits this maybe the cause. Will be more c/o w her CPAP and check bp and f/up      Reviewed: active problem list, medication list, allergies, notes from last encounter, lab results    A comprehensive review of systems was negative except for that written in the HPI. Assessment & Plan:   Diagnoses and all orders for this visit:    1. Primary hypertension    2. Migraine without aura and without status migrainosus, not intractable    3.  GARRY (obstructive sleep apnea)    4. Noncompliance with CPAP treatment    Follow-up and Dispositions    Return in about 6 weeks (around 11/30/2022) for HTN. 712  Subjective:   Shaun Cruz is a 56 y.o. female who was seen for Hypertension and Headache      Prior to Admission medications    Medication Sig Start Date End Date Taking? Authorizing Provider   propranolol LA (INDERAL LA) 60 mg SR capsule Take 1 Capsule by mouth daily. 9/28/22  Yes Angeline Pagan MD   butalbital-acetaminophen-caffeine (FIORICET, ESGIC) -40 mg per tablet Take 1 Tablet by mouth every six (6) hours as needed for Migraine for up to 30 doses. Max 6 /24 hours 9/28/22  Yes Des Babin MD   omeprazole (PRILOSEC) 40 mg capsule TAKE 1 CAPSULE BY MOUTH DAILY 8/26/22  Yes Angeline Pagan MD   atorvastatin (LIPITOR) 10 mg tablet Take 1 Tablet by mouth in the morning. 7/25/22  Yes Angeline Pagan MD   DULoxetine (CYMBALTA) 60 mg capsule Take 1 Capsule by mouth in the morning. 7/25/22  Yes Angeline Pagan MD   ergocalciferol (ERGOCALCIFEROL) 1,250 mcg (50,000 unit) capsule Take 1 Capsule by mouth every seven (7) days.  7/25/22  Yes Des Babin MD   albuterol (PROVENTIL HFA, VENTOLIN HFA, PROAIR HFA) 90 mcg/actuation inhaler INHALE 2 PUFFS EVERY 4 HOURS AS NEEDED FOR WHEEZING OR SHORTNESS OF BREATH AND COUGH 7/25/22  Yes Angeline Pagan MD   hydroCHLOROthiazide (HYDRODIURIL) 25 mg tablet Take 1 Tablet by mouth in the morning. 7/25/22  Yes Des Babin MD   losartan (COZAAR) 100 mg tablet Take 1 Tablet by mouth in the morning. 7/25/22  Yes Des Babin MD   montelukast (SINGULAIR) 10 mg tablet Take 1 Tablet by mouth in the morning. 7/25/22  Yes Des Babin MD   cetirizine (ZYRTEC) 10 mg tablet TAKE 1 TABLET BY MOUTH DAILY 6/29/22  Yes Des Babin MD   fluticasone propionate (FLONASE) 50 mcg/actuation nasal spray SHAKE LIQUID AND USE 2 SPRAYS IN EACH NOSTRIL DAILY 4/19/22  Yes Angeline Pagan MD   ibuprofen-famotidine 800-26.6 mg tab Duexis 800 mg-26.6 mg tablet   Yes Provider, Historical   albuterol (PROVENTIL VENTOLIN) 2.5 mg /3 mL (0.083 %) nebu 3 mL by Nebulization route every four (4) hours as needed for Wheezing or Shortness of Breath. 11/9/21  Yes Molly Dale MD   fluticasone propion-salmeteroL (Advair Diskus) 250-50 mcg/dose diskus inhaler USE 1 INHALATION BY MOUTH EVERY 12 HOURS 9/28/21  Yes Sandeep Babin MD   gabapentin (NEURONTIN) 600 mg tablet Take 600 mg by mouth three (3) times daily. Yes Provider, Historical   ocrelizumab (OCREVUS) 30 mg/mL soln injection by IntraVENous route. Yes Provider, Historical   tiZANidine (ZANAFLEX) 4 mg tablet  5/19/22   Provider, Historical     Allergies   Allergen Reactions    Tysabri [Natalizumab] Anaphylaxis         Objective:   Vital Signs: (As obtained by patient/caregiver at home)  There were no vitals taken for this visit.      Constitutional: [x] Appears well-developed and well-nourished [x] No apparent distress        Mental status: [x] Alert and awake  [x] Oriented to person/place/time [x] Able to follow commands      Eyes:   EOM    [x]  Normal      Sclera  [x]  Normal              Discharge [x]  None visible       HENT: [x] Normocephalic, atraumatic    [] Mouth/Throat: Mucous membranes are moist    External Ears [x] Normal      Neck: [x] No visualized mass     Pulmonary/Chest: [x] Respiratory effort normal   [x] No visualized signs of difficulty breathing or respiratory distress           Musculoskeletal:   [x] Normal gait with no signs of ataxia         [x] Normal range of motion of neck         Neurological:        [x] No Facial Asymmetry (Cranial nerve 7 motor function) (limited exam due to video visit)          [x] No gaze palsy              Skin:        [x] No significant exanthematous lesions or discoloration noted on facial skin                  Psychiatric:       [x] Normal Affect [] Abnormal -        [x] No Hallucinations      We discussed the expected course, resolution and complications of the diagnosis(es) in detail. Medication risks, benefits, costs, interactions, and alternatives were discussed as indicated. I advised her to contact the office if her condition worsens, changes or fails to improve as anticipated. She expressed understanding with the diagnosis(es) and plan. Maria Fernanda Florez is a 56 y.o. female being evaluated by a video visit encounter for concerns as above. A caregiver was present when appropriate. Due to this being a TeleHealth encounter (During RHTPX-26 public health emergency), evaluation of the following organ systems was limited: Vitals/Constitutional/EENT/Resp/CV/GI//MS/Neuro/Skin/Heme-Lymph-Imm. Pursuant to the emergency declaration under the [facility] waiver authority and the [employer] Act, this Virtual  Visit was conducted, with patient's (and/or legal guardian's) consent, to reduce the patient's risk of exposure to COVID-19 and provide necessary medical care. Services were provided through a video synchronous discussion virtually to substitute for in-person clinic visit. Patient and provider were located at their individual homes.         Isaiah Barbosa MD

## 2022-10-20 DIAGNOSIS — I10 PRIMARY HYPERTENSION: ICD-10-CM

## 2022-10-20 DIAGNOSIS — G43.009 MIGRAINE WITHOUT AURA AND WITHOUT STATUS MIGRAINOSUS, NOT INTRACTABLE: ICD-10-CM

## 2022-10-21 RX ORDER — PROPRANOLOL HYDROCHLORIDE 60 MG/1
CAPSULE, EXTENDED RELEASE ORAL
Qty: 30 CAPSULE | Refills: 1 | Status: SHIPPED | OUTPATIENT
Start: 2022-10-21

## 2022-11-21 DIAGNOSIS — K21.9 GASTROESOPHAGEAL REFLUX DISEASE WITHOUT ESOPHAGITIS: ICD-10-CM

## 2022-11-21 DIAGNOSIS — I10 PRIMARY HYPERTENSION: ICD-10-CM

## 2022-11-21 DIAGNOSIS — M48.061 FORAMINAL STENOSIS OF LUMBAR REGION: ICD-10-CM

## 2022-11-21 DIAGNOSIS — G43.009 MIGRAINE WITHOUT AURA AND WITHOUT STATUS MIGRAINOSUS, NOT INTRACTABLE: ICD-10-CM

## 2022-11-21 DIAGNOSIS — I10 ESSENTIAL HYPERTENSION: ICD-10-CM

## 2022-11-21 DIAGNOSIS — J45.41 ASTHMA IN ADULT, MODERATE PERSISTENT, WITH ACUTE EXACERBATION: ICD-10-CM

## 2022-11-21 DIAGNOSIS — E55.9 VITAMIN D DEFICIENCY: ICD-10-CM

## 2022-11-21 DIAGNOSIS — G89.29 CHRONIC BILATERAL LOW BACK PAIN WITH SCIATICA, SCIATICA LATERALITY UNSPECIFIED: ICD-10-CM

## 2022-11-21 DIAGNOSIS — M54.40 CHRONIC BILATERAL LOW BACK PAIN WITH SCIATICA, SCIATICA LATERALITY UNSPECIFIED: ICD-10-CM

## 2022-11-21 DIAGNOSIS — E78.00 PURE HYPERCHOLESTEROLEMIA: ICD-10-CM

## 2022-11-21 RX ORDER — PROPRANOLOL HYDROCHLORIDE 60 MG/1
CAPSULE, EXTENDED RELEASE ORAL
Qty: 90 CAPSULE | Refills: 1 | Status: SHIPPED | OUTPATIENT
Start: 2022-11-21

## 2022-11-22 RX ORDER — BUTALBITAL, ACETAMINOPHEN AND CAFFEINE 50; 325; 40 MG/1; MG/1; MG/1
1 TABLET ORAL
Qty: 30 TABLET | Refills: 0 | Status: SHIPPED | OUTPATIENT
Start: 2022-11-22

## 2022-11-22 RX ORDER — ATORVASTATIN CALCIUM 10 MG/1
10 TABLET, FILM COATED ORAL DAILY
Qty: 90 TABLET | Refills: 1 | Status: SHIPPED | OUTPATIENT
Start: 2022-11-22

## 2022-11-22 RX ORDER — CETIRIZINE HYDROCHLORIDE 10 MG/1
10 TABLET ORAL DAILY
Qty: 90 TABLET | Refills: 0 | Status: SHIPPED | OUTPATIENT
Start: 2022-11-22

## 2022-11-22 RX ORDER — OMEPRAZOLE 40 MG/1
40 CAPSULE, DELAYED RELEASE ORAL DAILY
Qty: 90 CAPSULE | Refills: 0 | Status: SHIPPED | OUTPATIENT
Start: 2022-11-22

## 2022-11-22 RX ORDER — MONTELUKAST SODIUM 10 MG/1
10 TABLET ORAL DAILY
Qty: 30 TABLET | Refills: 5 | Status: SHIPPED | OUTPATIENT
Start: 2022-11-22

## 2022-11-22 RX ORDER — DULOXETIN HYDROCHLORIDE 60 MG/1
60 CAPSULE, DELAYED RELEASE ORAL DAILY
Qty: 90 CAPSULE | Refills: 1 | Status: SHIPPED | OUTPATIENT
Start: 2022-11-22

## 2022-11-22 RX ORDER — ERGOCALCIFEROL 1.25 MG/1
50000 CAPSULE ORAL
Qty: 12 CAPSULE | Refills: 1 | Status: SHIPPED | OUTPATIENT
Start: 2022-11-22

## 2022-11-22 RX ORDER — HYDROCHLOROTHIAZIDE 25 MG/1
25 TABLET ORAL DAILY
Qty: 90 TABLET | Refills: 1 | Status: SHIPPED | OUTPATIENT
Start: 2022-11-22

## 2022-11-22 RX ORDER — LOSARTAN POTASSIUM 100 MG/1
100 TABLET ORAL DAILY
Qty: 90 TABLET | Refills: 1 | Status: SHIPPED | OUTPATIENT
Start: 2022-11-22

## 2022-12-13 ENCOUNTER — TRANSCRIBE ORDER (OUTPATIENT)
Dept: SCHEDULING | Age: 47
End: 2022-12-13

## 2022-12-13 DIAGNOSIS — Z12.31 BREAST CANCER SCREENING BY MAMMOGRAM: Primary | ICD-10-CM

## 2022-12-30 ENCOUNTER — VIRTUAL VISIT (OUTPATIENT)
Dept: FAMILY MEDICINE CLINIC | Age: 47
End: 2022-12-30
Payer: COMMERCIAL

## 2022-12-30 ENCOUNTER — HOSPITAL ENCOUNTER (OUTPATIENT)
Dept: MAMMOGRAPHY | Age: 47
Discharge: HOME | End: 2022-12-30
Attending: FAMILY MEDICINE
Payer: COMMERCIAL

## 2022-12-30 DIAGNOSIS — R07.9 CHEST PAIN, UNSPECIFIED TYPE: ICD-10-CM

## 2022-12-30 DIAGNOSIS — R06.09 DOE (DYSPNEA ON EXERTION): Primary | ICD-10-CM

## 2022-12-30 DIAGNOSIS — Z12.31 BREAST CANCER SCREENING BY MAMMOGRAM: ICD-10-CM

## 2022-12-30 DIAGNOSIS — E55.9 VITAMIN D DEFICIENCY: ICD-10-CM

## 2022-12-30 PROCEDURE — 77067 SCR MAMMO BI INCL CAD: CPT

## 2022-12-30 RX ORDER — ERGOCALCIFEROL 1.25 MG/1
50000 CAPSULE ORAL
Qty: 12 CAPSULE | Refills: 1 | Status: SHIPPED | OUTPATIENT
Start: 2022-12-30

## 2022-12-30 NOTE — PROGRESS NOTES
Consent: Swathi Mccormick, who was seen by synchronous (real-time) audio-video technology, and/or her healthcare decision maker, is aware that this patient-initiated, Telehealth encounter on 12/30/2022 is a billable service, with coverage as determined by her insurance carrier. She is aware that she may receive a bill and has provided verbal consent to proceed: Yes. Swathi Mccormick is a 56 y.o. female       has a past medical history of Autoimmune disease ([facility] (10/1/2008), BMI 45.0-49.9, adult ([facility] (02/14/2018), Chronic pain (9/1/2016), Depression (09/01/2020), Essential hypertension (04/17/2018), Gastroesophageal reflux disease without esophagitis (03/16/2017), H/O tubal ligation (03/16/2017), Iron deficiency anemia due to chronic blood loss (03/16/2017), Menorrhagia with regular cycle (04/03/2017), Migraine without aura and without status migrainosus, not intractable (03/16/2017), Mild intermittent asthma without complication (85/54/6187), MS (multiple sclerosis) ([facility] (10/2008), Obesity, Class III, BMI 40-49.9 (morbid obesity) ([facility] (12/03/2015), S/P endometrial ablation (04/16/2018), and Vitamin D deficiency (03/16/2017). HTN: hypertension, at home BP have been improving but still not at goal  HCTZ, losartan  Last visit added Propranolol (for both BP and headache) LA 60mg pulse have been in the 60s     HA is less frequent and sever since adding on propranolol, but still occurs. GARRY on mouth guard not been compliant b/c can't tolerate - but have been noncompliant b/c she forgets to              She admits this maybe the cause. Will be more c/o w her CPAP and check bp and f/up    Asthma: mild-moderate persistent w/o complication  Advair, albuterol    Feels chest tightness when active, feels hot. X 1 month. Denies being pain, and radiation to neck or arms. She does have Asthma. She does have many risk factors. Discussed if worsening or CP for >10 minutes to go to ED.    Currently NO chest pain  Can't do treadmill > 5 minutes    We'll order nuclear stress test.     Feels like ankle more swollen        Below not address at this visit will save for next visit    depression and anxiety  Mood has been good  Denies SI or HI. Cymbalta helps with pain. Also helped with her mood. She feels happy. Continue cymbalta 60mg. HLD: LDL worsen  LIpitor 10mg      Weight loss counseling:   BMI 51  She's been on topamax for 6 months. Does help with appetite, eats only once but still gaining weight. Discussed calories count, goal for 1400cal/day. Exercise. Choice of food. last visit we increased topamax 50mg bid - does help, appetite lessen - but affected her memory so she have stopped it. We'll go back lower dose of 25mg qhs for both weight loss and headache. Referred to gastric bypass. She never did call. Again reminded need to see us 3X for letter or support. GERD: prilosec 40mg      Chronic low back pain seeing pain specialist, had steroid injection. S/p nerve ablation on one side. So still have burning on the other side. Gabapentin 300mg written by Orthopedic Dr. Pino Arreguin. She up it herself to BID. Saw Dr. Pino Arreguin recently, 7/20/2020  Luis Nine to pain specialist had a steroid injection, nerve block just 2 days ago. Gabapentin increased to 600mg 4X daily. She's been in PT for 4 weeks. Neuro at [facility]  Migraine HA  MS managed               Gabapentin filled by her Neurologist     Saw OBGYN: had papsmear there 03/2022  Mammogram done 12/2021     Colonoscopy 12/2021, request medical record       Reviewed: active problem list, medication list, allergies, notes from last encounter, lab results    A comprehensive review of systems was negative except for that written in the HPI. Assessment & Plan:   Diagnoses and all orders for this visit:    1.  BROWN (dyspnea on exertion)  -     NUCLEAR CARDIAC STRESS TEST; Future  -     CBC W/O DIFF; Future  -     METABOLIC PANEL, COMPREHENSIVE; Future  -     TSH 3RD GENERATION; Future  -     XR CHEST PA LAT; Future    2. Chest pain, unspecified type  -     NUCLEAR CARDIAC STRESS TEST; Future  -     CBC W/O DIFF; Future  -     METABOLIC PANEL, COMPREHENSIVE; Future  -     TSH 3RD GENERATION; Future  -     XR CHEST PA LAT; Future    3. Vitamin D deficiency  -     ergocalciferol (ERGOCALCIFEROL) 1,250 mcg (50,000 unit) capsule; Take 1 Capsule by mouth every seven (7) days. Follow-up and Dispositions    Return in about 2 weeks (around 1/13/2023) for Chest pain, sooner to ED if symptoms worsen. Subjective:   Shirley Morris is a 56 y.o. female who was seen for Hypertension, Cholesterol Problem, Asthma, and Pain (Chronic)      Prior to Admission medications    Medication Sig Start Date End Date Taking? Authorizing Provider   ergocalciferol (ERGOCALCIFEROL) 1,250 mcg (50,000 unit) capsule Take 1 Capsule by mouth every seven (7) days. 12/30/22  Yes Gerlean Fothergill, MD   cetirizine (ZYRTEC) 10 mg tablet Take 1 Tablet by mouth daily. 11/22/22  Yes Gerlean Fothergill, MD   atorvastatin (LIPITOR) 10 mg tablet Take 1 Tablet by mouth daily. 11/22/22  Yes Gerlean Fothergill, MD   DULoxetine (CYMBALTA) 60 mg capsule Take 1 Capsule by mouth daily. 11/22/22  Yes Gerlean Fothergill, MD   hydroCHLOROthiazide (HYDRODIURIL) 25 mg tablet Take 1 Tablet by mouth daily. 11/22/22  Yes Gerlean Fothergill, MD   losartan (COZAAR) 100 mg tablet Take 1 Tablet by mouth daily. 11/22/22  Yes Gerlean Fothergill, MD   montelukast (SINGULAIR) 10 mg tablet Take 1 Tablet by mouth daily. 11/22/22  Yes Gerlean Fothergill, MD   omeprazole (PRILOSEC) 40 mg capsule Take 1 Capsule by mouth daily. 11/22/22  Yes Gerlean Fothergill, MD   butalbital-acetaminophen-caffeine (FIORICET, ESGIC) -40 mg per tablet Take 1 Tablet by mouth every six (6) hours as needed for Migraine for up to 30 doses.  Max 6 /24 hours 11/22/22  Yes Gerlean Fothergill, MD propranolol LA (INDERAL LA) 60 mg SR capsule TAKE 1 CAPSULE BY MOUTH EVERY DAY 11/21/22  Yes Madelin Babin MD   albuterol (PROVENTIL HFA, VENTOLIN HFA, PROAIR HFA) 90 mcg/actuation inhaler INHALE 2 PUFFS EVERY 4 HOURS AS NEEDED FOR WHEEZING OR SHORTNESS OF BREATH AND COUGH 7/25/22  Yes Madelin Babin MD   fluticasone propionate (FLONASE) 50 mcg/actuation nasal spray SHAKE LIQUID AND USE 2 SPRAYS IN EACH NOSTRIL DAILY 4/19/22  Yes Caleb Corbett MD   ibuprofen-famotidine 800-26.6 mg tab Duexis 800 mg-26.6 mg tablet   Yes Provider, Historical   albuterol (PROVENTIL VENTOLIN) 2.5 mg /3 mL (0.083 %) nebu 3 mL by Nebulization route every four (4) hours as needed for Wheezing or Shortness of Breath. 11/9/21  Yes Caleb Corbett MD   fluticasone propion-salmeteroL (Advair Diskus) 250-50 mcg/dose diskus inhaler USE 1 INHALATION BY MOUTH EVERY 12 HOURS 9/28/21  Yes Madelin Babin MD   gabapentin (NEURONTIN) 600 mg tablet Take 600 mg by mouth three (3) times daily. Yes Provider, Historical   ocrelizumab (OCREVUS) 30 mg/mL soln injection by IntraVENous route. Yes Provider, Historical   tiZANidine (ZANAFLEX) 4 mg tablet  5/19/22   Provider, Historical     Allergies   Allergen Reactions    Tysabri [Natalizumab] Anaphylaxis         Objective:   Vital Signs: (As obtained by patient/caregiver at home)  There were no vitals taken for this visit.      Constitutional: [x] Appears well-developed and well-nourished [x] No apparent distress        Mental status: [x] Alert and awake  [x] Oriented to person/place/time [x] Able to follow commands      Eyes:   EOM    [x]  Normal      Sclera  [x]  Normal              Discharge [x]  None visible       HENT: [x] Normocephalic, atraumatic    [] Mouth/Throat: Mucous membranes are moist    External Ears [x] Normal      Neck: [x] No visualized mass     Pulmonary/Chest: [x] Respiratory effort normal   [x] No visualized signs of difficulty breathing or respiratory distress Musculoskeletal:   [x] Normal gait with no signs of ataxia         [x] Normal range of motion of neck         Neurological:        [x] No Facial Asymmetry (Cranial nerve 7 motor function) (limited exam due to video visit)          [x] No gaze palsy              Skin:        [x] No significant exanthematous lesions or discoloration noted on facial skin                  Psychiatric:       [x] Normal Affect [] Abnormal -        [x] No Hallucinations      We discussed the expected course, resolution and complications of the diagnosis(es) in detail. Medication risks, benefits, costs, interactions, and alternatives were discussed as indicated. I advised her to contact the office if her condition worsens, changes or fails to improve as anticipated. She expressed understanding with the diagnosis(es) and plan. Pieter Monsalve is a 56 y.o. female being evaluated by a video visit encounter for concerns as above. A caregiver was present when appropriate. Due to this being a TeleHealth encounter (During [facility] public [facility] emergency), evaluation of the following organ systems was limited: Vitals/Constitutional/EENT/Resp/CV/GI//MS/Neuro/Skin/Heme-Lymph-Imm. Pursuant to the emergency declaration under the [facility], 1135 waiver authority and the [employer] General Act, this Virtual  Visit was conducted, with patient's (and/or legal guardian's) consent, to reduce the patient's risk of exposure to COVID-19 and provide necessary medical care. Services were provided through a video synchronous discussion virtually to substitute for in-person clinic visit. Patient and provider were located at their individual homes.         Monique Horta MD

## 2022-12-30 NOTE — PROGRESS NOTES
Chief Complaint   Patient presents with    Hypertension    Cholesterol Problem    Asthma    Pain (Chronic)     Check meds    1. Have you been to the ER, urgent care clinic since your last visit? Hospitalized since your last visit? No    2. Have you seen or consulted any other health care providers outside of the [address] [place] [address] since your last visit? Include any pap smears or colon screening.  No

## 2023-01-10 ENCOUNTER — VIRTUAL VISIT (OUTPATIENT)
Dept: FAMILY MEDICINE CLINIC | Age: 48
End: 2023-01-10
Payer: COMMERCIAL

## 2023-01-10 DIAGNOSIS — F41.8 DEPRESSION WITH ANXIETY: Primary | ICD-10-CM

## 2023-01-10 DIAGNOSIS — E66.01 MORBID OBESITY WITH BMI OF 50.0-59.9, ADULT (HCC): ICD-10-CM

## 2023-01-10 DIAGNOSIS — G43.009 MIGRAINE WITHOUT AURA AND WITHOUT STATUS MIGRAINOSUS, NOT INTRACTABLE: ICD-10-CM

## 2023-01-10 PROCEDURE — 99213 OFFICE O/P EST LOW 20 MIN: CPT | Performed by: FAMILY MEDICINE

## 2023-01-10 NOTE — PROGRESS NOTES
Consent: Heather Parrish, who was seen by synchronous (real-time) audio-video technology, and/or her healthcare decision maker, is aware that this patient-initiated, Telehealth encounter on 1/10/2023 is a billable service, with coverage as determined by her insurance carrier. She is aware that she may receive a bill and has provided verbal consent to proceed: Yes. Heather Parrish is a 56 y.o. female    She haven't scheduled her nuclear stress test yet.      has a past medical history of Autoimmune disease ([facility] (10/1/2008), BMI 45.0-49.9, adult ([facility] (02/14/2018), Chronic pain (9/1/2016), Depression (09/01/2020), Essential hypertension (04/17/2018), Gastroesophageal reflux disease without esophagitis (03/16/2017), H/O tubal ligation (03/16/2017), Iron deficiency anemia due to chronic blood loss (03/16/2017), Menorrhagia with regular cycle (04/03/2017), Migraine without aura and without status migrainosus, not intractable (03/16/2017), Mild intermittent asthma without complication (13/59/3974), MS (multiple sclerosis) ([facility] (10/2008), Obesity, Class III, BMI 40-49.9 (morbid obesity) ([facility] (12/03/2015), S/P endometrial ablation (04/16/2018), and Vitamin D deficiency (03/16/2017). Feels chest tightness when active, feels hot. X 1 month. Denies being pain, and radiation to neck or arms. She does have Asthma. She does have many risk factors. Discussed if worsening or CP for >10 minutes to go to ED. Currently NO chest pain  Can't do treadmill > 5 minutes  We've order nuclear stress test. But she haven't scheduled it yet. HTN: hypertension, at home BP have been improving but still not at goal  HCTZ, losartan, Propranolol (for both BP and headache) LA 60mg pulse have been in the 60s     HA is less frequent and severe since adding on propranolol, but still occurs. GARRY on mouth guard not been compliant b/c can't tolerate and b/c she forgets to  Also contributing to her BP.   Will be more c/o w her CPAP and check bp and f/up    Asthma: mild-moderate persistent w/o complication  Advair, albuterol    depression and anxiety  Mood has been good  Denies SI or HI. Cymbalta helps with pain. Also helped with her mood. She feels happy. Continue cymbalta 60mg. HLD: LDL worsen  LIpitor 10mg      Weight loss counseling:   BMI 51  She's been on topamax for 6 months. Does help with appetite, eats only once but still gaining weight. Discussed calories count, goal for 1400cal/day. Exercise. Choice of food. last visit we increased topamax 50mg bid - does help, appetite lessen - but affected her memory so she have stopped it. We'll go back lower dose of 25mg qhs for both weight loss and headache. Referred to gastric bypass. She never did call. Again reminded need to see us 3X for letter or support. GERD: prilosec 40mg      Chronic low back pain seeing pain specialist, had steroid injection. S/p nerve ablation on one side. So still have burning on the other side. Gabapentin 300mg written by Orthopedic Dr. Live Dennis. She up it herself to BID. Saw Dr. Live Dennis recently, 7/20/2020  Zhao Tang to pain specialist had a steroid injection, nerve block just 2 days ago. Gabapentin increased to 600mg 4X daily. She's been in PT for 4 weeks. Neuro at [facility]  Migraine HA  MS managed               Gabapentin filled by her Neurologist     Saw OBGYN: had papsmear there 03/2022  Mammogram done 12/2021     Colonoscopy 12/2021, request medical record       Reviewed: active problem list, medication list, allergies, notes from last encounter, lab results    A comprehensive review of systems was negative except for that written in the HPI. Assessment & Plan:   Diagnoses and all orders for this visit:    1. Depression with anxiety    2. Migraine without aura and without status migrainosus, not intractable    3.  Morbid obesity with BMI of 50.0-59.9, adult [facility])      Follow-up and Dispositions    Return in about 3 weeks (around 1/31/2023) for BROWN. Subjective:   Maria Fernanda Florez is a 56 y.o. female who was seen for Results      Prior to Admission medications    Medication Sig Start Date End Date Taking? Authorizing Provider   ergocalciferol (ERGOCALCIFEROL) 1,250 mcg (50,000 unit) capsule Take 1 Capsule by mouth every seven (7) days. 12/30/22  Yes Elroy Siddiqui MD   cetirizine (ZYRTEC) 10 mg tablet Take 1 Tablet by mouth daily. 11/22/22  Yes Elroy Siddiqui MD   atorvastatin (LIPITOR) 10 mg tablet Take 1 Tablet by mouth daily. 11/22/22  Yes Elroy Siddiqui MD   DULoxetine (CYMBALTA) 60 mg capsule Take 1 Capsule by mouth daily. 11/22/22  Yes Elroy Siddiqui MD   hydroCHLOROthiazide (HYDRODIURIL) 25 mg tablet Take 1 Tablet by mouth daily. 11/22/22  Yes Elroy Siddiqui MD   losartan (COZAAR) 100 mg tablet Take 1 Tablet by mouth daily. 11/22/22  Yes Elroy Siddiqui MD   montelukast (SINGULAIR) 10 mg tablet Take 1 Tablet by mouth daily. 11/22/22  Yes Elroy Siddiqui MD   omeprazole (PRILOSEC) 40 mg capsule Take 1 Capsule by mouth daily. 11/22/22  Yes Elroy Siddiqui MD   butalbital-acetaminophen-caffeine (FIORICET, ESGIC) -40 mg per tablet Take 1 Tablet by mouth every six (6) hours as needed for Migraine for up to 30 doses.  Max 6 /24 hours 11/22/22  Yes Tata Babin MD   propranolol LA (INDERAL LA) 60 mg SR capsule TAKE 1 CAPSULE BY MOUTH EVERY DAY 11/21/22  Yes Tata Babin MD   albuterol (PROVENTIL HFA, VENTOLIN HFA, PROAIR HFA) 90 mcg/actuation inhaler INHALE 2 PUFFS EVERY 4 HOURS AS NEEDED FOR WHEEZING OR SHORTNESS OF BREATH AND COUGH 7/25/22  Yes Tata Babin MD   fluticasone propionate (FLONASE) 50 mcg/actuation nasal spray SHAKE LIQUID AND USE 2 SPRAYS IN EACH NOSTRIL DAILY 4/19/22  Yes Tata Babin MD   ibuprofen-famotidine 800-26.6 mg tab Duexis 800 mg-26.6 mg tablet   Yes Provider, Historical   albuterol (PROVENTIL VENTOLIN) 2.5 mg /3 mL (0.083 %) nebu 3 mL by Nebulization route every four (4) hours as needed for Wheezing or Shortness of Breath. 11/9/21  Yes Ludwig Contreras MD   fluticasone propion-salmeteroL (Advair Diskus) 250-50 mcg/dose diskus inhaler USE 1 INHALATION BY MOUTH EVERY 12 HOURS 9/28/21  Yes Francesca Babin MD   gabapentin (NEURONTIN) 600 mg tablet Take 600 mg by mouth three (3) times daily. Yes Provider, Historical   ocrelizumab (OCREVUS) 30 mg/mL soln injection by IntraVENous route. Yes Provider, Historical   tiZANidine (ZANAFLEX) 4 mg tablet  5/19/22   Provider, Historical     Allergies   Allergen Reactions    Tysabri [Natalizumab] Anaphylaxis         Objective:   Vital Signs: (As obtained by patient/caregiver at home)  There were no vitals taken for this visit. Constitutional: [x] Appears well-developed and well-nourished [x] No apparent distress        Mental status: [x] Alert and awake  [x] Oriented to person/place/time [x] Able to follow commands      Eyes:   EOM    [x]  Normal      Sclera  [x]  Normal              Discharge [x]  None visible       HENT: [x] Normocephalic, atraumatic    [] Mouth/Throat: Mucous membranes are moist    External Ears [x] Normal      Neck: [x] No visualized mass     Pulmonary/Chest: [x] Respiratory effort normal   [x] No visualized signs of difficulty breathing or respiratory distress           Musculoskeletal:   [x] Normal gait with no signs of ataxia         [x] Normal range of motion of neck         Neurological:        [x] No Facial Asymmetry (Cranial nerve 7 motor function) (limited exam due to video visit)          [x] No gaze palsy              Skin:        [x] No significant exanthematous lesions or discoloration noted on facial skin                  Psychiatric:       [x] Normal Affect [] Abnormal -        [x] No Hallucinations      We discussed the expected course, resolution and complications of the diagnosis(es) in detail.   Medication risks, benefits, costs, interactions, and alternatives were discussed as indicated. I advised her to contact the office if her condition worsens, changes or fails to improve as anticipated. She expressed understanding with the diagnosis(es) and plan. Maria Fernanda Florez is a 56 y.o. female being evaluated by a video visit encounter for concerns as above. A caregiver was present when appropriate. Due to this being a TeleHealth encounter (During [facility]), evaluation of the following organ systems was limited: Vitals/Constitutional/EENT/Resp/CV/GI//MS/Neuro/Skin/Heme-Lymph-Imm. Pursuant to the emergency declaration under the [facility], 1135 waiver authority and the [employer] Act, this Virtual  Visit was conducted, with patient's (and/or legal guardian's) consent, to reduce the patient's risk of exposure to COVID-19 and provide necessary medical care. Services were provided through a video synchronous discussion virtually to substitute for in-person clinic visit. Patient and provider were located at their individual homes.         Isaiah Barbosa MD

## 2023-01-10 NOTE — PROGRESS NOTES
Chief Complaint   Patient presents with    Results       1. Have you been to the ER, urgent care clinic since your last visit? Hospitalized since your last visit? No    2. Have you seen or consulted any other health care providers outside of the [address] [place] [address] since your last visit? Include any pap smears or colon screening.  No

## 2023-01-27 ENCOUNTER — TELEPHONE (OUTPATIENT)
Dept: SURGERY | Age: 48
End: 2023-01-27

## 2023-01-27 NOTE — TELEPHONE ENCOUNTER
Returned pts call and advised that new patient packet was sent on 1/5.  Pt did not have the packet, sent another email on 1/27

## 2023-02-06 ENCOUNTER — OFFICE VISIT (OUTPATIENT)
Dept: FAMILY MEDICINE CLINIC | Age: 48
End: 2023-02-06

## 2023-02-06 VITALS
TEMPERATURE: 97.7 F | OXYGEN SATURATION: 96 % | SYSTOLIC BLOOD PRESSURE: 133 MMHG | DIASTOLIC BLOOD PRESSURE: 95 MMHG | RESPIRATION RATE: 18 BRPM | HEART RATE: 67 BPM | HEIGHT: 66 IN | WEIGHT: 293 LBS | BODY MASS INDEX: 47.09 KG/M2

## 2023-02-06 NOTE — PROGRESS NOTES
Error    Apt cancel    To f/up 1 week after nuclear stress test which is tomorrow.      Dajuan Chow MD  2/6/2023

## 2023-02-06 NOTE — PROGRESS NOTES
Chief Complaint   Patient presents with    Follow-up     3 week check       1. \"Have you been to the ER, urgent care clinic since your last visit? Hospitalized since your last visit? \" No    2. \"Have you seen or consulted any other health care providers outside of the [address] [place] [address] since your last visit? \" Yes        3. For patients aged 39-70: Has the patient had a colonoscopy / FIT/ Cologuard? Yes - no Care Gap present      If the patient is female:    4. For patients aged 41-77: Has the patient had a mammogram within the past 2 years? Yes - no Care Gap present      5. For patients aged 21-65: Has the patient had a pap smear? Yes - Care Gap present.  Rooming MA/LPN to request most recent results

## 2023-02-07 ENCOUNTER — HOSPITAL ENCOUNTER (OUTPATIENT)
Dept: NUCLEAR MEDICINE | Age: 48
Discharge: HOME | End: 2023-02-07
Attending: FAMILY MEDICINE
Payer: COMMERCIAL

## 2023-02-07 ENCOUNTER — HOSPITAL ENCOUNTER (OUTPATIENT)
Dept: NON INVASIVE DIAGNOSTICS | Age: 48
Discharge: HOME | End: 2023-02-07
Attending: FAMILY MEDICINE
Payer: COMMERCIAL

## 2023-02-07 VITALS
SYSTOLIC BLOOD PRESSURE: 148 MMHG | HEIGHT: 66 IN | BODY MASS INDEX: 47.09 KG/M2 | DIASTOLIC BLOOD PRESSURE: 101 MMHG | WEIGHT: 293 LBS

## 2023-02-07 DIAGNOSIS — R06.09 DOE (DYSPNEA ON EXERTION): ICD-10-CM

## 2023-02-07 DIAGNOSIS — R07.9 CHEST PAIN, UNSPECIFIED TYPE: ICD-10-CM

## 2023-02-07 PROCEDURE — 78452 HT MUSCLE IMAGE SPECT MULT: CPT

## 2023-02-07 PROCEDURE — 93017 CV STRESS TEST TRACING ONLY: CPT

## 2023-02-07 PROCEDURE — 74011250636 HC RX REV CODE- 250/636: Performed by: FAMILY MEDICINE

## 2023-02-07 PROCEDURE — 74011000250 HC RX REV CODE- 250: Performed by: FAMILY MEDICINE

## 2023-02-07 RX ORDER — SODIUM CHLORIDE 0.9 % (FLUSH) 0.9 %
20 SYRINGE (ML) INJECTION AS NEEDED
Status: DISCONTINUED | OUTPATIENT
Start: 2023-02-07 | End: 2023-02-11 | Stop reason: HOSPADM

## 2023-02-07 RX ORDER — TETRAKIS(2-METHOXYISOBUTYLISOCYANIDE)COPPER(I) TETRAFLUOROBORATE 1 MG/ML
30.3 INJECTION, POWDER, LYOPHILIZED, FOR SOLUTION INTRAVENOUS
Status: COMPLETED | OUTPATIENT
Start: 2023-02-07 | End: 2023-02-07

## 2023-02-07 RX ADMIN — REGADENOSON 0.4 MG: 0.08 INJECTION, SOLUTION INTRAVENOUS at 09:03

## 2023-02-07 RX ADMIN — SODIUM CHLORIDE, PRESERVATIVE FREE 20 ML: 5 INJECTION INTRAVENOUS at 09:04

## 2023-02-07 RX ADMIN — TETRAKIS(2-METHOXYISOBUTYLISOCYANIDE)COPPER(I) TETRAFLUOROBORATE 30.3 MILLICURIE: 1 INJECTION, POWDER, LYOPHILIZED, FOR SOLUTION INTRAVENOUS at 08:55

## 2023-02-08 ENCOUNTER — HOSPITAL ENCOUNTER (OUTPATIENT)
Dept: NUCLEAR MEDICINE | Age: 48
Discharge: HOME | End: 2023-02-08
Attending: FAMILY MEDICINE
Payer: COMMERCIAL

## 2023-02-08 LAB
STRESS BASELINE DIAS BP: 101 MMHG
STRESS BASELINE HR: 71 BPM
STRESS BASELINE SYS BP: 148 MMHG
STRESS ESTIMATED WORKLOAD: 1 METS
STRESS EXERCISE DUR MIN: 3 MIN
STRESS EXERCISE DUR SEC: 0 SEC
STRESS PEAK DIAS BP: 108 MMHG
STRESS PEAK SYS BP: 150 MMHG
STRESS PERCENT HR ACHIEVED: 57 %
STRESS POST PEAK HR: 100 BPM
STRESS RATE PRESSURE PRODUCT: NORMAL BPM*MMHG
STRESS STAGE 1 BP: NORMAL MMHG
STRESS STAGE 1 DURATION: 1 MIN:SEC
STRESS STAGE 1 HR: 75 BPM
STRESS STAGE 2 DURATION: 1 MIN:SEC
STRESS STAGE 2 HR: 93 BPM
STRESS STAGE 3 BP: NORMAL MMHG
STRESS STAGE 3 DURATION: 1 MIN:SEC
STRESS STAGE 3 HR: 90 BPM
STRESS STAGE RECOVERY 1 BP: NORMAL MMHG
STRESS STAGE RECOVERY 1 DURATION: 1 MIN:SEC
STRESS STAGE RECOVERY 1 HR: 85 BPM
STRESS STAGE RECOVERY 2 DURATION: 1 MIN:SEC
STRESS STAGE RECOVERY 2 HR: 81 BPM
STRESS STAGE RECOVERY 3 BP: NORMAL MMHG
STRESS STAGE RECOVERY 3 DURATION: 1 MIN:SEC
STRESS STAGE RECOVERY 3 HR: 77 BPM
STRESS TARGET HR: 174 BPM

## 2023-02-08 RX ORDER — TETRAKIS(2-METHOXYISOBUTYLISOCYANIDE)COPPER(I) TETRAFLUOROBORATE 1 MG/ML
32.4 INJECTION, POWDER, LYOPHILIZED, FOR SOLUTION INTRAVENOUS
Status: COMPLETED | OUTPATIENT
Start: 2023-02-08 | End: 2023-02-08

## 2023-02-08 RX ADMIN — TETRAKIS(2-METHOXYISOBUTYLISOCYANIDE)COPPER(I) TETRAFLUOROBORATE 32.4 MILLICURIE: 1 INJECTION, POWDER, LYOPHILIZED, FOR SOLUTION INTRAVENOUS at 08:05

## 2023-02-18 DIAGNOSIS — K21.9 GASTROESOPHAGEAL REFLUX DISEASE WITHOUT ESOPHAGITIS: ICD-10-CM

## 2023-02-20 RX ORDER — OMEPRAZOLE 40 MG/1
CAPSULE, DELAYED RELEASE ORAL
Qty: 90 CAPSULE | Refills: 0 | Status: SHIPPED | OUTPATIENT
Start: 2023-02-20

## 2023-02-21 DIAGNOSIS — M48.061 FORAMINAL STENOSIS OF LUMBAR REGION: ICD-10-CM

## 2023-02-21 DIAGNOSIS — G89.29 CHRONIC BILATERAL LOW BACK PAIN WITH SCIATICA, SCIATICA LATERALITY UNSPECIFIED: ICD-10-CM

## 2023-02-21 DIAGNOSIS — K21.9 GASTROESOPHAGEAL REFLUX DISEASE WITHOUT ESOPHAGITIS: ICD-10-CM

## 2023-02-21 DIAGNOSIS — E55.9 VITAMIN D DEFICIENCY: ICD-10-CM

## 2023-02-21 DIAGNOSIS — I10 ESSENTIAL HYPERTENSION: ICD-10-CM

## 2023-02-21 DIAGNOSIS — M54.40 CHRONIC BILATERAL LOW BACK PAIN WITH SCIATICA, SCIATICA LATERALITY UNSPECIFIED: ICD-10-CM

## 2023-02-21 RX ORDER — OMEPRAZOLE 40 MG/1
40 CAPSULE, DELAYED RELEASE ORAL DAILY
Qty: 90 CAPSULE | Refills: 0 | Status: CANCELLED | OUTPATIENT
Start: 2023-02-21

## 2023-02-21 RX ORDER — LOSARTAN POTASSIUM 100 MG/1
100 TABLET ORAL DAILY
Qty: 90 TABLET | Refills: 1 | Status: CANCELLED | OUTPATIENT
Start: 2023-02-21

## 2023-02-21 RX ORDER — ERGOCALCIFEROL 1.25 MG/1
50000 CAPSULE ORAL
Qty: 12 CAPSULE | Refills: 1 | Status: CANCELLED | OUTPATIENT
Start: 2023-02-21

## 2023-02-21 RX ORDER — DULOXETIN HYDROCHLORIDE 60 MG/1
60 CAPSULE, DELAYED RELEASE ORAL DAILY
Qty: 90 CAPSULE | Refills: 1 | Status: CANCELLED | OUTPATIENT
Start: 2023-02-21

## 2023-02-21 NOTE — TELEPHONE ENCOUNTER
Prilosec was sent on 2/20/23 for #90. Vitamin-D was sent on 12/30/22 for #12 with 1 refill. Cozaar was sent on 11/22/22 for #90 with 1 refill. Cymbalta was sent on 11/22/22 for #90 with 1 refill. For Pharmacy Admin Tracking Only    Program: Medication Refill  CPA in place:   Recommendation Provided To:    Intervention Detail: New Rx: 4, reason: Patient Preference  Intervention Accepted By:   Josefa Doshi Closed?:   Time Spent (min): 5

## 2023-03-01 ENCOUNTER — VIRTUAL VISIT (OUTPATIENT)
Dept: FAMILY MEDICINE CLINIC | Age: 48
End: 2023-03-01

## 2023-03-01 DIAGNOSIS — M54.40 CHRONIC BILATERAL LOW BACK PAIN WITH SCIATICA, SCIATICA LATERALITY UNSPECIFIED: ICD-10-CM

## 2023-03-01 DIAGNOSIS — G89.29 CHRONIC BILATERAL LOW BACK PAIN WITH SCIATICA, SCIATICA LATERALITY UNSPECIFIED: ICD-10-CM

## 2023-03-01 DIAGNOSIS — R06.09 DOE (DYSPNEA ON EXERTION): ICD-10-CM

## 2023-03-01 DIAGNOSIS — E78.00 PURE HYPERCHOLESTEROLEMIA: ICD-10-CM

## 2023-03-01 DIAGNOSIS — J45.40 ASTHMA WITHOUT STATUS ASTHMATICUS, MODERATE PERSISTENT, UNCOMPLICATED: ICD-10-CM

## 2023-03-01 DIAGNOSIS — I10 PRIMARY HYPERTENSION: Primary | ICD-10-CM

## 2023-03-01 DIAGNOSIS — M48.061 FORAMINAL STENOSIS OF LUMBAR REGION: ICD-10-CM

## 2023-03-01 DIAGNOSIS — G43.009 MIGRAINE WITHOUT AURA AND WITHOUT STATUS MIGRAINOSUS, NOT INTRACTABLE: ICD-10-CM

## 2023-03-01 PROBLEM — M54.41 CHRONIC BILATERAL LOW BACK PAIN WITH SCIATICA: Status: ACTIVE | Noted: 2023-03-01

## 2023-03-01 PROBLEM — M54.42 CHRONIC BILATERAL LOW BACK PAIN WITH SCIATICA: Status: ACTIVE | Noted: 2023-03-01

## 2023-03-01 PROCEDURE — 99213 OFFICE O/P EST LOW 20 MIN: CPT | Performed by: FAMILY MEDICINE

## 2023-03-01 RX ORDER — LOSARTAN POTASSIUM 100 MG/1
100 TABLET ORAL DAILY
Qty: 90 TABLET | Refills: 1 | Status: SHIPPED | OUTPATIENT
Start: 2023-03-01

## 2023-03-01 RX ORDER — HYDROCHLOROTHIAZIDE 25 MG/1
25 TABLET ORAL DAILY
Qty: 90 TABLET | Refills: 1 | Status: SHIPPED | OUTPATIENT
Start: 2023-03-01

## 2023-03-01 RX ORDER — ATORVASTATIN CALCIUM 10 MG/1
10 TABLET, FILM COATED ORAL DAILY
Qty: 90 TABLET | Refills: 1 | Status: SHIPPED | OUTPATIENT
Start: 2023-03-01

## 2023-03-01 RX ORDER — PROPRANOLOL HYDROCHLORIDE 60 MG/1
60 CAPSULE, EXTENDED RELEASE ORAL DAILY
Qty: 90 CAPSULE | Refills: 1 | Status: SHIPPED | OUTPATIENT
Start: 2023-03-01

## 2023-03-01 RX ORDER — DULOXETIN HYDROCHLORIDE 60 MG/1
60 CAPSULE, DELAYED RELEASE ORAL DAILY
Qty: 90 CAPSULE | Refills: 1 | Status: SHIPPED | OUTPATIENT
Start: 2023-03-01

## 2023-03-01 RX ORDER — ALBUTEROL SULFATE 90 UG/1
AEROSOL, METERED RESPIRATORY (INHALATION)
Qty: 18 G | Refills: 2 | Status: SHIPPED | OUTPATIENT
Start: 2023-03-01

## 2023-03-01 RX ORDER — MONTELUKAST SODIUM 10 MG/1
10 TABLET ORAL DAILY
Qty: 30 TABLET | Refills: 5 | Status: SHIPPED | OUTPATIENT
Start: 2023-03-01

## 2023-03-01 RX ORDER — AMLODIPINE BESYLATE 5 MG/1
5 TABLET ORAL DAILY
Qty: 90 TABLET | Refills: 1 | Status: SHIPPED | OUTPATIENT
Start: 2023-03-01

## 2023-03-01 RX ORDER — LIDOCAINE 50 MG/G
PATCH TOPICAL
COMMUNITY

## 2023-03-01 NOTE — PROGRESS NOTES
Consent: Judy Carranza, who was seen by synchronous (real-time) audio-video technology, and/or her healthcare decision maker, is aware that this patient-initiated, Telehealth encounter on 3/1/2023 is a billable service, with coverage as determined by her insurance carrier. She is aware that she may receive a bill and has provided verbal consent to proceed: Yes. Judy Carranza is a 56 y.o. female    She haven't scheduled her nuclear stress test yet.      has a past medical history of Autoimmune disease ([facility] (10/1/2008), BMI 45.0-49.9, adult ([facility] (02/14/2018), Chronic pain (9/1/2016), Depression (09/01/2020), Essential hypertension (04/17/2018), Gastroesophageal reflux disease without esophagitis (03/16/2017), H/O tubal ligation (03/16/2017), Iron deficiency anemia due to chronic blood loss (03/16/2017), Menorrhagia with regular cycle (04/03/2017), Migraine without aura and without status migrainosus, not intractable (03/16/2017), Mild intermittent asthma without complication (50/95/7211), MS (multiple sclerosis) ([facility] (10/2008), Obesity, Class III, BMI 40-49.9 (morbid obesity) ([facility] (12/03/2015), S/P endometrial ablation (04/16/2018), and Vitamin D deficiency (03/16/2017). Nuclear stress test review  Impression:   Reversible defect in the anteroapical wall is most likely related to soft tissue attenuation greater on stress imaging, however in this setting myocardium at ischemic risk cannot be excluded Left ventricular ejection fraction is 54 %. Refer cardiology     HTN: hypertension, at home BP have been improving but still not at goal  HCTZ, losartan, Propranolol (for both BP and headache) LA 60mg pulse have been in the 60s. Add amlodipine 5mg. HA is less frequent and severe since adding on propranolol, but still occurs. GARRY on mouth guard not been compliant b/c can't tolerate and b/c she forgets to  Also contributing to her BP.   Will be more c/o w her CPAP and check bp and f/up    Asthma: mild-moderate persistent w/o complication  Advair, albuterol    depression and anxiety  Mood has been good  Denies SI or HI. Cymbalta helps with pain. Also helped with her mood. She feels happy. Continue cymbalta 60mg. HLD: LDL worsen  LIpitor 10mg      Weight loss counseling:   BMI 51  She's been on topamax for 6 months. Does help with appetite, eats only once but still gaining weight. Discussed calories count, goal for 1400cal/day. Exercise. Choice of food. last visit we increased topamax 50mg bid - does help, appetite lessen - but affected her memory so she have stopped it. We'll go back lower dose of 25mg qhs for both weight loss and headache. Referred to gastric bypass. She never did call. Again reminded need to see us 3X for letter or support. GERD: prilosec 40mg      Chronic low back pain seeing pain specialist, had steroid injection. S/p nerve ablation on one side. So still have burning on the other side. Gabapentin 300mg written by Orthopedic Dr. Brenda Johnson. She up it herself to BID. Saw Dr. Brenda Johnson recently, 7/20/2020  [place] to pain specialist had a steroid injection, nerve block just 2 days ago. Gabapentin increased to 600mg 4X daily. She's been in PT for 4 weeks. Neuro at [facility]  Migraine HA  MS managed               Gabapentin filled by her Neurologist     Saw OBGYN: had papsmear there 03/2022  Mammogram done 12/2021     Colonoscopy 12/2021, request medical record       Reviewed: active problem list, medication list, allergies, notes from last encounter, lab results    A comprehensive review of systems was negative except for that written in the HPI. Assessment & Plan:   Diagnoses and all orders for this visit:    1. Primary hypertension  -     amLODIPine (NORVASC) 5 mg tablet; Take 1 Tablet by mouth daily. -     hydroCHLOROthiazide (HYDRODIURIL) 25 mg tablet;  Take 1 Tablet by mouth daily. -     losartan (COZAAR) 100 mg tablet; Take 1 Tablet by mouth daily. -     propranolol LA (INDERAL LA) 60 mg SR capsule; Take 1 Capsule by mouth daily. 2. BROWN (dyspnea on exertion)  -     REFERRAL TO CARDIOLOGY    3. Pure hypercholesterolemia  -     atorvastatin (LIPITOR) 10 mg tablet; Take 1 Tablet by mouth daily. 4. Foraminal stenosis of lumbar region  -     DULoxetine (CYMBALTA) 60 mg capsule; Take 1 Capsule by mouth daily. 5. Chronic bilateral low back pain with sciatica, sciatica laterality unspecified  -     DULoxetine (CYMBALTA) 60 mg capsule; Take 1 Capsule by mouth daily. 6. Asthma without status asthmaticus, moderate persistent, uncomplicated  -     montelukast (SINGULAIR) 10 mg tablet; Take 1 Tablet by mouth daily. -     albuterol (PROVENTIL HFA, VENTOLIN HFA, PROAIR HFA) 90 mcg/actuation inhaler; INHALE 2 PUFFS EVERY 4 HOURS AS NEEDED FOR WHEEZING OR SHORTNESS OF BREATH AND COUGH    7. Migraine without aura and without status migrainosus, not intractable  -     propranolol LA (INDERAL LA) 60 mg SR capsule; Take 1 Capsule by mouth daily. Follow-up and Dispositions    Return in about 4 months (around 7/1/2023). Subjective:   Rosalie Gauthier is a 56 y.o. female who was seen for Results (Stress test)      Prior to Admission medications    Medication Sig Start Date End Date Taking? Authorizing Provider   lidocaine (LIDODERM) 5 %    Yes Provider, Historical   amLODIPine (NORVASC) 5 mg tablet Take 1 Tablet by mouth daily. 3/1/23  Yes Joanne Arteaga MD   atorvastatin (LIPITOR) 10 mg tablet Take 1 Tablet by mouth daily. 3/1/23  Yes Joanne Arteaga MD   DULoxetine (CYMBALTA) 60 mg capsule Take 1 Capsule by mouth daily. 3/1/23  Yes Joanne Arteaga MD   hydroCHLOROthiazide (HYDRODIURIL) 25 mg tablet Take 1 Tablet by mouth daily. 3/1/23  Yes Joanne Arteaga MD   losartan (COZAAR) 100 mg tablet Take 1 Tablet by mouth daily.  3/1/23  Yes Joanne Arteaga MD montelukast (SINGULAIR) 10 mg tablet Take 1 Tablet by mouth daily. 3/1/23  Yes Christina Bustamante MD   propranolol LA (INDERAL LA) 60 mg SR capsule Take 1 Capsule by mouth daily. 3/1/23  Yes Josefa Babin MD   albuterol (PROVENTIL HFA, VENTOLIN HFA, PROAIR HFA) 90 mcg/actuation inhaler INHALE 2 PUFFS EVERY 4 HOURS AS NEEDED FOR WHEEZING OR SHORTNESS OF BREATH AND COUGH 3/1/23  Yes Josefa Babin MD   omeprazole (PRILOSEC) 40 mg capsule TAKE 1 CAPSULE BY MOUTH EVERY DAY 2/20/23  Yes Christina Bustamante MD   ergocalciferol (ERGOCALCIFEROL) 1,250 mcg (50,000 unit) capsule Take 1 Capsule by mouth every seven (7) days. 12/30/22  Yes Christina Bustamante MD   cetirizine (ZYRTEC) 10 mg tablet Take 1 Tablet by mouth daily. 11/22/22  Yes Christina Bustamante MD   butalbital-acetaminophen-caffeine (FIORICET, ESGIC) -40 mg per tablet Take 1 Tablet by mouth every six (6) hours as needed for Migraine for up to 30 doses. Max 6 /24 hours 11/22/22  Yes Josefa Babin MD   fluticasone propionate (FLONASE) 50 mcg/actuation nasal spray SHAKE LIQUID AND USE 2 SPRAYS IN EACH NOSTRIL DAILY 4/19/22  Yes Josefa Babin MD   ibuprofen-famotidine 800-26.6 mg tab Duexis 800 mg-26.6 mg tablet   Yes Provider, Historical   fluticasone propion-salmeteroL (Advair Diskus) 250-50 mcg/dose diskus inhaler USE 1 INHALATION BY MOUTH EVERY 12 HOURS 9/28/21  Yes Christina Bustamante MD   gabapentin (NEURONTIN) 600 mg tablet Take 600 mg by mouth three (3) times daily. Yes Provider, Historical   ocrelizumab (OCREVUS) 30 mg/mL soln injection by IntraVENous route. Yes Provider, Historical   tiZANidine (ZANAFLEX) 4 mg tablet  5/19/22   Provider, Historical   albuterol (PROVENTIL VENTOLIN) 2.5 mg /3 mL (0.083 %) nebu 3 mL by Nebulization route every four (4) hours as needed for Wheezing or Shortness of Breath.   Patient not taking: No sig reported 11/9/21   Christina Bustamante MD     Allergies   Allergen Reactions    Tysabri [Natalizumab] Anaphylaxis         Objective:   Vital Signs: (As obtained by patient/caregiver at home)  There were no vitals taken for this visit. Constitutional: [x] Appears well-developed and well-nourished [x] No apparent distress        Mental status: [x] Alert and awake  [x] Oriented to person/place/time [x] Able to follow commands      Eyes:   EOM    [x]  Normal      Sclera  [x]  Normal              Discharge [x]  None visible       HENT: [x] Normocephalic, atraumatic    [] Mouth/Throat: Mucous membranes are moist    External Ears [x] Normal      Neck: [x] No visualized mass     Pulmonary/Chest: [x] Respiratory effort normal   [x] No visualized signs of difficulty breathing or respiratory distress           Musculoskeletal:   [x] Normal gait with no signs of ataxia         [x] Normal range of motion of neck         Neurological:        [x] No Facial Asymmetry (Cranial nerve 7 motor function) (limited exam due to video visit)          [x] No gaze palsy              Skin:        [x] No significant exanthematous lesions or discoloration noted on facial skin                  Psychiatric:       [x] Normal Affect [] Abnormal -        [x] No Hallucinations      We discussed the expected course, resolution and complications of the diagnosis(es) in detail. Medication risks, benefits, costs, interactions, and alternatives were discussed as indicated. I advised her to contact the office if her condition worsens, changes or fails to improve as anticipated. She expressed understanding with the diagnosis(es) and plan. Quincy Rae is a 56 y.o. female being evaluated by a video visit encounter for concerns as above. A caregiver was present when appropriate. Due to this being a TeleHealth encounter (During [facility] public health emergency), evaluation of the following organ systems was limited: Vitals/Constitutional/EENT/Resp/CV/GI//MS/Neuro/Skin/Heme-Lymph-Imm.   Pursuant to the emergency declaration under the [facility] Act, 1135 waiver authority and the Coronavirus Preparedness and Response Supplemental Appropriations Act, this Virtual  Visit was conducted, with patient's (and/or legal guardian's) consent, to reduce the patient's risk of exposure to COVID-19 and provide necessary medical care. Services were provided through a video synchronous discussion virtually to substitute for in-person clinic visit. Patient and provider were located at their individual homes.         Madhuri Hughes MD

## 2023-03-01 NOTE — PROGRESS NOTES
Chief Complaint   Patient presents with    Results     Stress test       1. Have you been to the ER, urgent care clinic since your last visit? Hospitalized since your last visit? No    2. Have you seen or consulted any other health care providers outside of the [address] [place] [address] since your last visit? Include any pap smears or colon screening.  Yes

## 2023-03-05 DIAGNOSIS — E55.9 VITAMIN D DEFICIENCY: ICD-10-CM

## 2023-03-06 RX ORDER — ERGOCALCIFEROL 1.25 MG/1
50000 CAPSULE ORAL
Qty: 12 CAPSULE | Refills: 1 | OUTPATIENT
Start: 2023-03-06

## 2023-03-06 NOTE — TELEPHONE ENCOUNTER
Vitamin-D was sent on 12/30/22 for #12 with 1 refill    For Pharmacy Admin Tracking Only    Program: Medication Refill  CPA in place:   Recommendation Provided To:    Intervention Detail: New Rx: 1, reason: Patient Preference  Intervention Accepted By:   Jamal Barry Closed?:   Time Spent (min): 5

## 2023-03-19 DIAGNOSIS — E55.9 VITAMIN D DEFICIENCY: ICD-10-CM

## 2023-03-19 DIAGNOSIS — K21.9 GASTROESOPHAGEAL REFLUX DISEASE WITHOUT ESOPHAGITIS: ICD-10-CM

## 2023-03-20 ENCOUNTER — APPOINTMENT (OUTPATIENT)
Dept: GENERAL RADIOLOGY | Age: 48
End: 2023-03-20

## 2023-03-20 ENCOUNTER — OFFICE VISIT (OUTPATIENT)
Dept: FAMILY MEDICINE CLINIC | Age: 48
End: 2023-03-20
Payer: COMMERCIAL

## 2023-03-20 VITALS
SYSTOLIC BLOOD PRESSURE: 129 MMHG | HEIGHT: 66 IN | TEMPERATURE: 97.3 F | DIASTOLIC BLOOD PRESSURE: 85 MMHG | HEART RATE: 75 BPM | BODY MASS INDEX: 47.09 KG/M2 | RESPIRATION RATE: 16 BRPM | WEIGHT: 293 LBS | OXYGEN SATURATION: 95 %

## 2023-03-20 DIAGNOSIS — M25.562 CHRONIC PAIN OF LEFT KNEE: Primary | ICD-10-CM

## 2023-03-20 DIAGNOSIS — G89.29 CHRONIC PAIN OF LEFT KNEE: Primary | ICD-10-CM

## 2023-03-20 PROCEDURE — 3074F SYST BP LT 130 MM HG: CPT | Performed by: FAMILY MEDICINE

## 2023-03-20 PROCEDURE — 99213 OFFICE O/P EST LOW 20 MIN: CPT | Performed by: FAMILY MEDICINE

## 2023-03-20 PROCEDURE — 3079F DIAST BP 80-89 MM HG: CPT | Performed by: FAMILY MEDICINE

## 2023-03-20 RX ORDER — TRAMADOL HYDROCHLORIDE 50 MG/1
50 TABLET ORAL
Qty: 14 TABLET | Refills: 0 | Status: SHIPPED | OUTPATIENT
Start: 2023-03-20 | End: 2023-03-27

## 2023-03-20 NOTE — PROGRESS NOTES
Chief Complaint   Patient presents with    Knee Pain     Left knee hurting for a month       1. \"Have you been to the ER, urgent care clinic since your last visit? Hospitalized since your last visit? \" No    2. \"Have you seen or consulted any other health care providers outside of the [address] [place] [address] since your last visit? \" No     3. For patients aged 39-70: Has the patient had a colonoscopy / FIT/ Cologuard? Yes - no Care Gap present      If the patient is female:    4. For patients aged 41-77: Has the patient had a mammogram within the past 2 years? Yes - no Care Gap present      5. For patients aged 21-65: Has the patient had a pap smear?  No

## 2023-03-20 NOTE — PROGRESS NOTES
Chaparro Chan is a 53 y.o. female seen for      has a past medical history of Autoimmune disease ([facility] (10/1/2008), BMI 45.0-49.9, adult ([facility] (02/14/2018), Chronic pain (9/1/2016), Depression (09/01/2020), Essential hypertension (04/17/2018), Gastroesophageal reflux disease without esophagitis (03/16/2017), H/O tubal ligation (03/16/2017), Iron deficiency anemia due to chronic blood loss (03/16/2017), Menorrhagia with regular cycle (04/03/2017), Migraine without aura and without status migrainosus, not intractable (03/16/2017), Mild intermittent asthma without complication (02/58/4606), MS (multiple sclerosis) ([facility] (10/2008), Obesity, Class III, BMI 40-49.9 (morbid obesity) ([facility] (12/03/2015), S/P endometrial ablation (04/16/2018), and Vitamin D deficiency (03/16/2017). evaluation and treatment of left knee pain. This is evaluated as a personal injury. The pain began 4 weeks ago. Injury history: no, this just started on own. The pain is located suprapatellar. Symptoms improve with rest. Symptoms worsen with activity. The knee has not given out or felt unstable. The patient can bend and straighten the knee fully. Treatment to date has included NSAID's, without significant relief. Patients activity level: sedentery    A comprehensive review of systems was negative except for that written in the HPI. Current Outpatient Medications   Medication Sig Dispense Refill    traMADoL (ULTRAM) 50 mg tablet Take 1 Tablet by mouth two (2) times daily as needed for Pain for up to 7 days. Max Daily Amount: 100 mg. 14 Tablet 0    lidocaine (LIDODERM) 5 %       amLODIPine (NORVASC) 5 mg tablet Take 1 Tablet by mouth daily. 90 Tablet 1    atorvastatin (LIPITOR) 10 mg tablet Take 1 Tablet by mouth daily. 90 Tablet 1    DULoxetine (CYMBALTA) 60 mg capsule Take 1 Capsule by mouth daily. 90 Capsule 1    hydroCHLOROthiazide (HYDRODIURIL) 25 mg tablet Take 1 Tablet by mouth daily.  90 Tablet 1    losartan (COZAAR) 100 mg tablet Take 1 Tablet by mouth daily. 90 Tablet 1    montelukast (SINGULAIR) 10 mg tablet Take 1 Tablet by mouth daily. 30 Tablet 5    propranolol LA (INDERAL LA) 60 mg SR capsule Take 1 Capsule by mouth daily. 90 Capsule 1    albuterol (PROVENTIL HFA, VENTOLIN HFA, PROAIR HFA) 90 mcg/actuation inhaler INHALE 2 PUFFS EVERY 4 HOURS AS NEEDED FOR WHEEZING OR SHORTNESS OF BREATH AND COUGH 18 g 2    omeprazole (PRILOSEC) 40 mg capsule TAKE 1 CAPSULE BY MOUTH EVERY DAY 90 Capsule 0    ergocalciferol (ERGOCALCIFEROL) 1,250 mcg (50,000 unit) capsule Take 1 Capsule by mouth every seven (7) days. 12 Capsule 1    cetirizine (ZYRTEC) 10 mg tablet Take 1 Tablet by mouth daily. 90 Tablet 0    butalbital-acetaminophen-caffeine (FIORICET, ESGIC) -40 mg per tablet Take 1 Tablet by mouth every six (6) hours as needed for Migraine for up to 30 doses. Max 6 /24 hours 30 Tablet 0    fluticasone propionate (FLONASE) 50 mcg/actuation nasal spray SHAKE LIQUID AND USE 2 SPRAYS IN EACH NOSTRIL DAILY 48 g 1    ibuprofen-famotidine 800-26.6 mg tab Duexis 800 mg-26.6 mg tablet      fluticasone propion-salmeteroL (Advair Diskus) 250-50 mcg/dose diskus inhaler USE 1 INHALATION BY MOUTH EVERY 12 HOURS 60 Each 2    gabapentin (NEURONTIN) 600 mg tablet Take 600 mg by mouth three (3) times daily. ocrelizumab (OCREVUS) 30 mg/mL soln injection by IntraVENous route. tiZANidine (ZANAFLEX) 4 mg tablet  (Patient not taking: No sig reported)      albuterol (PROVENTIL VENTOLIN) 2.5 mg /3 mL (0.083 %) nebu 3 mL by Nebulization route every four (4) hours as needed for Wheezing or Shortness of Breath.  (Patient not taking: No sig reported) 200 Each 0     Allergies   Allergen Reactions    Tysabri [Natalizumab] Anaphylaxis     Past Medical History:   Diagnosis Date    Autoimmune disease ([facility] 10/1/2008    Multiple Sclerosis    BMI 45.0-49.9, adult ([facility] 02/14/2018    Chronic pain 9/1/2016    Lumbar back pain    Depression 09/01/2020    Seems to come and go    Essential hypertension 2018    Gastroesophageal reflux disease without esophagitis 2017    H/O tubal ligation 2017    Iron deficiency anemia due to chronic blood loss 2017    Menorrhagia with regular cycle 2017    Migraine without aura and without status migrainosus, not intractable 2017    Mild intermittent asthma without complication     MS (multiple sclerosis) ([facility] 10/2008    using clinical trial medications    Obesity, Class III, BMI 40-49.9 (morbid obesity) ([facility] 2015    S/P endometrial ablation 2018    Vitamin D deficiency 2017     Past Surgical History:   Procedure Laterality Date    HX  SECTION      x3; , , &     FL LAPAROSCOPY FULGURATION OVIDUCTS      FL UNLISTED NEUROLOGICAL/NEUROMUSCULAR DX PX  10/1/2008    Multiple Sclerosis        Objective:      Visit Vitals  /85   Pulse 75   Temp 97.3 °F (36.3 °C) (Temporal)   Resp 16   Ht 5' 6\" (1.676 m)   Wt 340 lb (154.2 kg)   SpO2 95%   BMI 54.88 kg/m²       General appearance: alert, cooperative, no distress, appears stated age  Neurologic: Alert and oriented X 3, normal strength and tone, symmetric. Normal without focal findings. Cranial nerves 2-12 intact. Normal coordination and gait. Mental status: Alert, oriented, thought content appropriate, affect: stable, mood-congruent. Head: Normocephalic, without obvious abnormality, atraumatic  Eyes: conjunctivae/corneas clear. PERRL, EOM's intact. Neck: supple, symmetrical, trachea midline, no JVD  Lungs: clear to auscultation bilaterally  Heart: regular rate and rhythm, S1, S2 normal, no murmur, click, rub or gallop  Abdomen: soft, non-tender. Extremities: extremities normal, atraumatic, no cyanosis or edema    Gait: Normal. The patient can bear weight on the injured extremity.    Left Lower Extremity  Hip Palpation:  no tenderness over the greater  trochanter   Hip ROM: 100% of normal   Hamstring tightness reveals a popliteal angle to be 40 degrees   Knee Effusion:  None. Ecchymosis:  none   Knee ROM:  -5 to 140 degrees without subpatellar   crepitance. Patella:  Patella does track normally. Patellar apprehension test: negative  Patellar compression test: positive   Tenderness: quad tendon insertion   Stability:  Lachman's test: negative  Posterior drawer: negative  Medial collateral ligament: negative  Lateral collateral ligament: negative     Ankit Test:  negative   Peace's Test:  negative with no joint line tenderness   Sensation:   intact to light touch   Pulses: normal DP and PT pulses       Assessment:     Diagnoses and all orders for this visit:    1. Chronic pain of left knee  -     XR KNEE LT 3 V; Future  -     traMADoL (ULTRAM) 50 mg tablet; Take 1 Tablet by mouth two (2) times daily as needed for Pain for up to 7 days. Max Daily Amount: 100 mg.  -     REFERRAL TO PHYSICAL THERAPY    Follow-up and Dispositions    Return in about 3 months (around 6/20/2023) for chronic medical issues, sooner as needed.            Dajuan Chow MD  3/20/2023

## 2023-03-21 RX ORDER — OMEPRAZOLE 40 MG/1
CAPSULE, DELAYED RELEASE ORAL
Qty: 90 CAPSULE | Refills: 0 | Status: SHIPPED | OUTPATIENT
Start: 2023-03-21

## 2023-03-21 RX ORDER — ERGOCALCIFEROL 1.25 MG/1
CAPSULE ORAL
Qty: 12 CAPSULE | Refills: 1 | Status: SHIPPED | OUTPATIENT
Start: 2023-03-21

## 2023-03-28 RX ORDER — ACETAMINOPHEN 325 MG/1
650 TABLET ORAL ONCE
Status: COMPLETED | OUTPATIENT
Start: 2023-03-30 | End: 2023-03-30

## 2023-03-28 RX ORDER — DIPHENHYDRAMINE HCL 25 MG
50 CAPSULE ORAL ONCE
Status: COMPLETED | OUTPATIENT
Start: 2023-03-30 | End: 2023-03-30

## 2023-03-30 ENCOUNTER — HOSPITAL ENCOUNTER (OUTPATIENT)
Dept: INFUSION THERAPY | Age: 48
Discharge: HOME | End: 2023-03-30
Payer: COMMERCIAL

## 2023-03-30 VITALS
TEMPERATURE: 97.6 F | DIASTOLIC BLOOD PRESSURE: 74 MMHG | HEART RATE: 73 BPM | SYSTOLIC BLOOD PRESSURE: 116 MMHG | OXYGEN SATURATION: 97 % | RESPIRATION RATE: 16 BRPM | WEIGHT: 293 LBS | BODY MASS INDEX: 55.07 KG/M2

## 2023-03-30 PROCEDURE — 74011250636 HC RX REV CODE- 250/636: Performed by: PSYCHIATRY & NEUROLOGY

## 2023-03-30 PROCEDURE — 74011000250 HC RX REV CODE- 250: Performed by: PSYCHIATRY & NEUROLOGY

## 2023-03-30 PROCEDURE — 96413 CHEMO IV INFUSION 1 HR: CPT

## 2023-03-30 PROCEDURE — 96375 TX/PRO/DX INJ NEW DRUG ADDON: CPT

## 2023-03-30 PROCEDURE — 96415 CHEMO IV INFUSION ADDL HR: CPT

## 2023-03-30 PROCEDURE — 74011250637 HC RX REV CODE- 250/637: Performed by: PSYCHIATRY & NEUROLOGY

## 2023-03-30 RX ORDER — SODIUM CHLORIDE 9 MG/ML
25 INJECTION, SOLUTION INTRAVENOUS AS NEEDED
Status: DISCONTINUED | OUTPATIENT
Start: 2023-03-30 | End: 2023-03-31 | Stop reason: HOSPADM

## 2023-03-30 RX ORDER — SODIUM CHLORIDE 0.9 % (FLUSH) 0.9 %
5-10 SYRINGE (ML) INJECTION AS NEEDED
Status: DISCONTINUED | OUTPATIENT
Start: 2023-03-30 | End: 2023-03-31 | Stop reason: HOSPADM

## 2023-03-30 RX ADMIN — DIPHENHYDRAMINE HYDROCHLORIDE 50 MG: 25 CAPSULE ORAL at 10:00

## 2023-03-30 RX ADMIN — METHYLPREDNISOLONE SODIUM SUCCINATE 125 MG: 125 INJECTION, POWDER, FOR SOLUTION INTRAMUSCULAR; INTRAVENOUS at 10:06

## 2023-03-30 RX ADMIN — SODIUM CHLORIDE 25 ML/HR: 9 INJECTION, SOLUTION INTRAVENOUS at 10:05

## 2023-03-30 RX ADMIN — SODIUM CHLORIDE, PRESERVATIVE FREE 10 ML: 5 INJECTION INTRAVENOUS at 10:04

## 2023-03-30 RX ADMIN — OCRELIZUMAB 600 MG: 300 INJECTION INTRAVENOUS at 11:00

## 2023-03-30 RX ADMIN — ACETAMINOPHEN 650 MG: 325 TABLET ORAL at 10:00

## 2023-03-30 NOTE — PROGRESS NOTES
[facility] Visit Note    156- Pt arrived to [facility] ambulatory in no acute distress for Ocrevus. Assessment unremarkable except dyspnea with exertion and weakness/fatigue. IV established in left AC without issue and positive blood return noted. The following medications administered:  Medications Administered       0.9% sodium chloride infusion       Admin Date  03/30/2023 Action  New Bag Dose  25 mL/hr Rate  25 mL/hr Route  IntraVENous Administered By  Dora Rodriguez RN              acetaminophen (TYLENOL) tablet 650 mg       Admin Date  03/30/2023 Action  Given Dose  650 mg Route  Oral Administered By  Dora Rodriguez RN              diphenhydrAMINE (BENADRYL) capsule 50 mg       Admin Date  03/30/2023 Action  Given Dose  50 mg Route  Oral Administered By  Dora Rodriguez RN              methylPREDNISolone (PF) (Solu-MEDROL) injection 125 mg       Admin Date  03/30/2023 Action  Given Dose  125 mg Route  IntraVENous Administered By  Dora Rodriguez RN              ocrelizumab (OCREVUS) 600 mg in 0.9% sodium chloride 500 mL infusion       Admin Date  03/30/2023 Action  New Bag Dose  600 mg Route  IntraVENous Administered By  Dora Rodriguez RN              sodium chloride (NS) flush 5-10 mL       Admin Date  03/30/2023 Action  Given Dose  10 mL Route  IntraVENous Administered By  Dora Rodriguez RN                  Patient Vitals for the past 12 hrs:   Temp Pulse Resp BP SpO2   03/30/23 1335 -- 73 16 116/74 --   03/30/23 1200 97.6 °F (36.4 °C) 73 16 112/72 --   03/30/23 1130 97.9 °F (36.6 °C) 70 16 115/76 --   03/30/23 1115 97.7 °F (36.5 °C) 72 16 113/74 --   03/30/23 0955 98.4 °F (36.9 °C) 88 18 (!) 149/95 97 %     1340- Pt tolerated treatment well, no adverse reactions noted. Patient declined to stay for observation post infusion. IV flushed per policy and removed, 2x2 and coban placed. Pt discharged ambulatory in no acute distress, accompanied by self. Next appointment 9/28/23.

## 2023-06-08 NOTE — DISCHARGE INSTRUCTIONS
Please make a followup with Dr. Leah Gutierrez to discuss your headaches. If you have any new or worsening concerning medical symptoms please return to the emergency department. Y90 completed. Patient tolerated well; VSS. Hemostasis achieved to right groin via 5 Fr Vascade at 1424; patient to remain flat until 1624. Site CDI without hematoma. Patient to be transported to [facility] for post procedure scan then to [facility] for recovery; report to be given at the bedside.

## 2023-06-19 ENCOUNTER — OFFICE VISIT (OUTPATIENT)
Age: 48
End: 2023-06-19
Payer: COMMERCIAL

## 2023-06-19 VITALS
TEMPERATURE: 98.1 F | BODY MASS INDEX: 47.09 KG/M2 | HEART RATE: 75 BPM | RESPIRATION RATE: 16 BRPM | HEIGHT: 66 IN | DIASTOLIC BLOOD PRESSURE: 81 MMHG | WEIGHT: 293 LBS | SYSTOLIC BLOOD PRESSURE: 134 MMHG | OXYGEN SATURATION: 96 %

## 2023-06-19 DIAGNOSIS — K21.9 GASTROESOPHAGEAL REFLUX DISEASE, UNSPECIFIED WHETHER ESOPHAGITIS PRESENT: ICD-10-CM

## 2023-06-19 DIAGNOSIS — E78.00 PURE HYPERCHOLESTEROLEMIA: ICD-10-CM

## 2023-06-19 DIAGNOSIS — I10 PRIMARY HYPERTENSION: Primary | ICD-10-CM

## 2023-06-19 DIAGNOSIS — Z13.1 SCREENING FOR DIABETES MELLITUS: ICD-10-CM

## 2023-06-19 DIAGNOSIS — Z79.899 ENCOUNTER FOR LONG-TERM (CURRENT) USE OF MEDICATIONS: ICD-10-CM

## 2023-06-19 DIAGNOSIS — E66.01 OBESITY, CLASS III, BMI 40-49.9 (MORBID OBESITY) (HCC): ICD-10-CM

## 2023-06-19 DIAGNOSIS — E55.9 VITAMIN D DEFICIENCY: ICD-10-CM

## 2023-06-19 DIAGNOSIS — G47.33 OSA ON CPAP: ICD-10-CM

## 2023-06-19 DIAGNOSIS — Z99.89 OSA ON CPAP: ICD-10-CM

## 2023-06-19 DIAGNOSIS — Z78.9 WEIGHT LOSS ADVISED: ICD-10-CM

## 2023-06-19 DIAGNOSIS — F33.0 DEPRESSION, MAJOR, RECURRENT, MILD (HCC): ICD-10-CM

## 2023-06-19 DIAGNOSIS — G44.229 CHRONIC TENSION-TYPE HEADACHE, NOT INTRACTABLE: ICD-10-CM

## 2023-06-19 DIAGNOSIS — R41.840 CONCENTRATION DEFICIT: ICD-10-CM

## 2023-06-19 DIAGNOSIS — J45.40 ASTHMA WITHOUT STATUS ASTHMATICUS, MODERATE PERSISTENT, UNCOMPLICATED: ICD-10-CM

## 2023-06-19 PROCEDURE — 3075F SYST BP GE 130 - 139MM HG: CPT | Performed by: FAMILY MEDICINE

## 2023-06-19 PROCEDURE — 99215 OFFICE O/P EST HI 40 MIN: CPT | Performed by: FAMILY MEDICINE

## 2023-06-19 PROCEDURE — 3079F DIAST BP 80-89 MM HG: CPT | Performed by: FAMILY MEDICINE

## 2023-06-19 RX ORDER — BUSPIRONE HYDROCHLORIDE 15 MG/1
15 TABLET ORAL 2 TIMES DAILY PRN
Qty: 60 TABLET | Refills: 2 | Status: SHIPPED | OUTPATIENT
Start: 2023-06-19

## 2023-06-19 RX ORDER — DULOXETIN HYDROCHLORIDE 60 MG/1
60 CAPSULE, DELAYED RELEASE ORAL DAILY
Qty: 90 CAPSULE | Refills: 1 | Status: SHIPPED | OUTPATIENT
Start: 2023-06-19

## 2023-06-19 RX ORDER — PROPRANOLOL HCL 60 MG
60 CAPSULE, EXTENDED RELEASE 24HR ORAL DAILY
Qty: 90 CAPSULE | Refills: 1 | Status: SHIPPED | OUTPATIENT
Start: 2023-06-19

## 2023-06-19 RX ORDER — ALBUTEROL SULFATE 90 UG/1
AEROSOL, METERED RESPIRATORY (INHALATION)
Qty: 18 G | Refills: 2 | Status: SHIPPED | OUTPATIENT
Start: 2023-06-19

## 2023-06-19 RX ORDER — ERGOCALCIFEROL 1.25 MG/1
50000 CAPSULE ORAL
Qty: 12 CAPSULE | Refills: 1 | Status: SHIPPED | OUTPATIENT
Start: 2023-06-19

## 2023-06-19 RX ORDER — LOSARTAN POTASSIUM 100 MG/1
100 TABLET ORAL DAILY
Qty: 90 TABLET | Refills: 1 | Status: SHIPPED | OUTPATIENT
Start: 2023-06-19

## 2023-06-19 RX ORDER — ATORVASTATIN CALCIUM 10 MG/1
10 TABLET, FILM COATED ORAL NIGHTLY
Qty: 90 TABLET | Refills: 1 | Status: SHIPPED | OUTPATIENT
Start: 2023-06-19

## 2023-06-19 RX ORDER — OMEPRAZOLE 40 MG/1
CAPSULE, DELAYED RELEASE ORAL
Qty: 90 CAPSULE | Refills: 1 | Status: SHIPPED | OUTPATIENT
Start: 2023-06-19

## 2023-06-19 RX ORDER — SEMAGLUTIDE 0.25 MG/.5ML
0.25 INJECTION, SOLUTION SUBCUTANEOUS
Qty: 2 ML | Refills: 0 | Status: SHIPPED | OUTPATIENT
Start: 2023-06-19

## 2023-06-19 RX ORDER — HYDROCHLOROTHIAZIDE 25 MG/1
25 TABLET ORAL DAILY
Qty: 90 TABLET | Refills: 1 | Status: SHIPPED | OUTPATIENT
Start: 2023-06-19

## 2023-06-19 RX ORDER — AMLODIPINE BESYLATE 5 MG/1
5 TABLET ORAL DAILY
Qty: 90 TABLET | Refills: 1 | Status: SHIPPED | OUTPATIENT
Start: 2023-06-19

## 2023-06-19 RX ORDER — MONTELUKAST SODIUM 10 MG/1
10 TABLET ORAL DAILY
Qty: 90 TABLET | Refills: 1 | Status: SHIPPED | OUTPATIENT
Start: 2023-06-19

## 2023-06-19 SDOH — ECONOMIC STABILITY: FOOD INSECURITY: WITHIN THE PAST 12 MONTHS, YOU WORRIED THAT YOUR FOOD WOULD RUN OUT BEFORE YOU GOT MONEY TO BUY MORE.: NEVER TRUE

## 2023-06-19 SDOH — ECONOMIC STABILITY: INCOME INSECURITY: HOW HARD IS IT FOR YOU TO PAY FOR THE VERY BASICS LIKE FOOD, HOUSING, MEDICAL CARE, AND HEATING?: NOT HARD AT ALL

## 2023-06-19 SDOH — ECONOMIC STABILITY: HOUSING INSECURITY
IN THE LAST 12 MONTHS, WAS THERE A TIME WHEN YOU DID NOT HAVE A STEADY PLACE TO SLEEP OR SLEPT IN A SHELTER (INCLUDING NOW)?: NO

## 2023-06-19 SDOH — ECONOMIC STABILITY: FOOD INSECURITY: WITHIN THE PAST 12 MONTHS, THE FOOD YOU BOUGHT JUST DIDN'T LAST AND YOU DIDN'T HAVE MONEY TO GET MORE.: NEVER TRUE

## 2023-06-19 ASSESSMENT — PATIENT HEALTH QUESTIONNAIRE - PHQ9
1. LITTLE INTEREST OR PLEASURE IN DOING THINGS: 0
10. IF YOU CHECKED OFF ANY PROBLEMS, HOW DIFFICULT HAVE THESE PROBLEMS MADE IT FOR YOU TO DO YOUR WORK, TAKE CARE OF THINGS AT HOME, OR GET ALONG WITH OTHER PEOPLE: 0
SUM OF ALL RESPONSES TO PHQ QUESTIONS 1-9: 0
3. TROUBLE FALLING OR STAYING ASLEEP: 0
5. POOR APPETITE OR OVEREATING: 0
SUM OF ALL RESPONSES TO PHQ QUESTIONS 1-9: 0
2. FEELING DOWN, DEPRESSED OR HOPELESS: 0
8. MOVING OR SPEAKING SO SLOWLY THAT OTHER PEOPLE COULD HAVE NOTICED. OR THE OPPOSITE, BEING SO FIGETY OR RESTLESS THAT YOU HAVE BEEN MOVING AROUND A LOT MORE THAN USUAL: 0
9. THOUGHTS THAT YOU WOULD BE BETTER OFF DEAD, OR OF HURTING YOURSELF: 0
7. TROUBLE CONCENTRATING ON THINGS, SUCH AS READING THE NEWSPAPER OR WATCHING TELEVISION: 0
SUM OF ALL RESPONSES TO PHQ QUESTIONS 1-9: 0
4. FEELING TIRED OR HAVING LITTLE ENERGY: 0
SUM OF ALL RESPONSES TO PHQ9 QUESTIONS 1 & 2: 0
6. FEELING BAD ABOUT YOURSELF - OR THAT YOU ARE A FAILURE OR HAVE LET YOURSELF OR YOUR FAMILY DOWN: 0
SUM OF ALL RESPONSES TO PHQ QUESTIONS 1-9: 0

## 2023-06-19 ASSESSMENT — ANXIETY QUESTIONNAIRES
4. TROUBLE RELAXING: 1
1. FEELING NERVOUS, ANXIOUS, OR ON EDGE: 1
7. FEELING AFRAID AS IF SOMETHING AWFUL MIGHT HAPPEN: 0
3. WORRYING TOO MUCH ABOUT DIFFERENT THINGS: 1
2. NOT BEING ABLE TO STOP OR CONTROL WORRYING: 1
6. BECOMING EASILY ANNOYED OR IRRITABLE: 0
IF YOU CHECKED OFF ANY PROBLEMS ON THIS QUESTIONNAIRE, HOW DIFFICULT HAVE THESE PROBLEMS MADE IT FOR YOU TO DO YOUR WORK, TAKE CARE OF THINGS AT HOME, OR GET ALONG WITH OTHER PEOPLE: SOMEWHAT DIFFICULT
GAD7 TOTAL SCORE: 5
5. BEING SO RESTLESS THAT IT IS HARD TO SIT STILL: 1

## 2023-06-19 NOTE — PROGRESS NOTES
Chief Complaint   Patient presents with    Hypertension    Cholesterol Problem     3 mo check    1. Have you been to the ER, urgent care clinic since your last visit? Hospitalized since your last visit? No    2. Have you seen or consulted any other health care providers outside of the [address] [place] [address] since your last visit? Include any pap smears or colon screening.  No
(ZANAFLEX) 4 mg tablet       No current facility-administered medications on file prior to visit.      Patient Active Problem List   Diagnosis    Anemia    H/O tubal ligation    Menorrhagia with regular cycle    Iron deficiency anemia due to chronic blood loss    Obesity, Class III, BMI 40-49.9 (morbid obesity) (HCC)    BMI 45.0-49.9, adult ([facility])    Mild intermittent asthma without complication    Depression, major, recurrent, mild ([facility])    Encounter for long-term (current) use of medications    Gastroesophageal reflux disease without esophagitis    Acute relapsing multiple sclerosis ([facility])    Migraine without aura and without status migrainosus, not intractable    S/P endometrial ablation    Heavy menstrual period    Multiple sclerosis ([facility])    Vitamin D deficiency    Primary hypertension    Chronic bilateral low back pain with sciatica    Foraminal stenosis of lumbar region    Pure hypercholesterolemia    Asthma without status asthmaticus, moderate persistent, uncomplicated    EDUARDO on CPAP       Jennifer Woods MD  6/19/2023

## 2023-07-20 DIAGNOSIS — F33.0 DEPRESSION, MAJOR, RECURRENT, MILD (HCC): ICD-10-CM

## 2023-07-24 RX ORDER — BUSPIRONE HYDROCHLORIDE 15 MG/1
15 TABLET ORAL 2 TIMES DAILY PRN
Qty: 180 TABLET | Refills: 0 | Status: SHIPPED | OUTPATIENT
Start: 2023-07-24

## 2023-08-15 ENCOUNTER — TELEMEDICINE (OUTPATIENT)
Age: 48
End: 2023-08-15
Payer: COMMERCIAL

## 2023-08-15 DIAGNOSIS — U09.9 COVID-19 LONG HAULER: Primary | ICD-10-CM

## 2023-08-15 DIAGNOSIS — Z02.89 ENCOUNTER TO OBTAIN EXCUSE FROM WORK: ICD-10-CM

## 2023-08-15 DIAGNOSIS — E55.9 VITAMIN D DEFICIENCY: ICD-10-CM

## 2023-08-15 DIAGNOSIS — R05.2 SUBACUTE COUGH: ICD-10-CM

## 2023-08-15 PROCEDURE — 99213 OFFICE O/P EST LOW 20 MIN: CPT | Performed by: FAMILY MEDICINE

## 2023-08-15 RX ORDER — PREDNISONE 20 MG/1
TABLET ORAL
COMMUNITY
Start: 2023-08-07

## 2023-08-15 RX ORDER — ERGOCALCIFEROL 1.25 MG/1
50000 CAPSULE ORAL
Qty: 12 CAPSULE | Refills: 1 | Status: SHIPPED | OUTPATIENT
Start: 2023-08-15

## 2023-08-15 RX ORDER — BENZONATATE 200 MG/1
200 CAPSULE ORAL 3 TIMES DAILY PRN
Qty: 30 CAPSULE | Refills: 0 | Status: SHIPPED | OUTPATIENT
Start: 2023-08-15 | End: 2023-08-22

## 2023-08-15 RX ORDER — PREDNISONE 10 MG/1
10 TABLET ORAL DAILY
Qty: 5 TABLET | Refills: 0 | Status: SHIPPED | OUTPATIENT
Start: 2023-08-15 | End: 2023-08-20

## 2023-08-15 NOTE — PROGRESS NOTES
Chief Complaint   Patient presents with    Cough     Positive for covid 8/7/23 cough, headache, body aches. Tested negative yesterday but still has cough, headache & body aches. Finished Paxlovid. 1. Have you been to the ER, urgent care clinic since your last visit? Hospitalized since your last visit? Yes [facility]    2. Have you seen or consulted any other health care providers outside of the [address] [place] [address] since your last visit? Include any pap smears or colon screening.  No

## 2023-08-15 NOTE — PROGRESS NOTES
8/15/2023    TELEHEALTH EVALUATION -- Audio/Visual    HPI:    Shaheed Trejo (:  1975) has requested an audio/video evaluation for the following concern(s):    Went on a cruise, came back > 10 days ago. Have been sick. Went to [facility] was positive for COVID. Was treated w paxlovid and prednisone. Finished the paxlovid yesterday and have a few prednisone left. Felt like was getting better but then back again. Headache, cough, decongestant. Feels hot, have a fan in front of her. But not SOB. Was taken out of work till today. Review of Systems    Prior to Visit Medications    Medication Sig Taking?  Authorizing Provider   predniSONE (DELTASONE) 20 MG tablet  Yes Historical Provider, MD   ergocalciferol (ERGOCALCIFEROL) 1.25 MG (48386 UT) capsule Take 1 capsule by mouth every 7 days Yes Coreen Ojeda MD   benzonatate (TESSALON) 200 MG capsule Take 1 capsule by mouth 3 times daily as needed for Cough Yes Coreen Ojeda MD   predniSONE (DELTASONE) 10 MG tablet Take 1 tablet by mouth daily for 5 days Yes Coreen Ojeda MD   amLODIPine (NORVASC) 5 MG tablet Take 1 tablet by mouth daily Yes Coreen Ojeda MD   atorvastatin (LIPITOR) 10 MG tablet Take 1 tablet by mouth at bedtime Yes Coreen Ojeda MD   DULoxetine (CYMBALTA) 60 MG extended release capsule Take 1 capsule by mouth daily Yes Coreen Ojeda MD   hydroCHLOROthiazide (HYDRODIURIL) 25 MG tablet Take 1 tablet by mouth daily Yes Coreen Ojeda MD   losartan (COZAAR) 100 MG tablet Take 1 tablet by mouth daily Yes Coreen Ojeda MD   albuterol sulfate HFA (PROVENTIL;VENTOLIN;PROAIR) 108 (90 Base) MCG/ACT inhaler INHALE 2 PUFFS EVERY 4 HOURS AS NEEDED FOR WHEEZING OR SHORTNESS OF BREATH AND COUGH Yes Coreen Ojeda MD   propranolol (INDERAL LA) 60 MG extended release capsule Take 1 capsule by mouth daily Yes Coreen Ojeda MD   omeprazole (PRILOSEC) 40 MG delayed release capsule TAKE 1 CAPSULE BY MOUTH EVERY DAY Yes Coreen Ojeda MD

## 2023-08-16 ENCOUNTER — OFFICE VISIT (OUTPATIENT)
Age: 48
End: 2023-08-16
Payer: COMMERCIAL

## 2023-08-16 VITALS
DIASTOLIC BLOOD PRESSURE: 80 MMHG | SYSTOLIC BLOOD PRESSURE: 138 MMHG | HEIGHT: 66 IN | WEIGHT: 293 LBS | RESPIRATION RATE: 16 BRPM | HEART RATE: 64 BPM | BODY MASS INDEX: 47.09 KG/M2 | OXYGEN SATURATION: 94 %

## 2023-08-16 DIAGNOSIS — I10 PRIMARY HYPERTENSION: Primary | ICD-10-CM

## 2023-08-16 DIAGNOSIS — R94.39 ABNORMAL STRESS TEST: ICD-10-CM

## 2023-08-16 PROCEDURE — 3079F DIAST BP 80-89 MM HG: CPT | Performed by: SPECIALIST

## 2023-08-16 PROCEDURE — 99204 OFFICE O/P NEW MOD 45 MIN: CPT | Performed by: SPECIALIST

## 2023-08-16 PROCEDURE — 3075F SYST BP GE 130 - 139MM HG: CPT | Performed by: SPECIALIST

## 2023-08-16 PROCEDURE — 93000 ELECTROCARDIOGRAM COMPLETE: CPT | Performed by: SPECIALIST

## 2023-08-16 NOTE — PROGRESS NOTES
(PROVENTIL;VENTOLIN;PROAIR) 108 (90 Base) MCG/ACT inhaler INHALE 2 PUFFS EVERY 4 HOURS AS NEEDED FOR WHEEZING OR SHORTNESS OF BREATH AND COUGH 18 g 2    propranolol (INDERAL LA) 60 MG extended release capsule Take 1 capsule by mouth daily 90 capsule 1    omeprazole (PRILOSEC) 40 MG delayed release capsule TAKE 1 CAPSULE BY MOUTH EVERY DAY 90 capsule 1    montelukast (SINGULAIR) 10 MG tablet Take 1 tablet by mouth daily 90 tablet 1    albuterol (PROVENTIL) (2.5 MG/3ML) 0.083% nebulizer solution Inhale into the lungs every 4 hours as needed      butalbital-acetaminophen-caffeine (FIORICET, ESGIC) -40 MG per tablet Take 1 tablet by mouth every 6 hours as needed      cetirizine (ZYRTEC) 10 MG tablet Take by mouth daily      fluticasone (FLONASE) 50 MCG/ACT nasal spray SHAKE LIQUID AND USE 2 SPRAYS IN EACH NOSTRIL DAILY      fluticasone-salmeterol (ADVAIR) 250-50 MCG/ACT AEPB diskus inhaler USE 1 INHALATION BY MOUTH EVERY 12 HOURS      gabapentin (NEURONTIN) 600 MG tablet Take by mouth 3 times daily. ibuprofen-famotidine 800-26.6 MG TABS Duexis 800 mg-26.6 mg tablet      lidocaine (LIDODERM) 5 % ceived the following from [facility]: Outside name: lidocaine (LIDODERM) 5 %      ocrelizumab (OCREVUS) 300 MG/10ML SOLN injection Infuse intravenously      tiZANidine (ZANAFLEX) 4 MG tablet ceived the following from [facility]: Outside name: tiZANidine (ZANAFLEX) 4 mg tablet (Patient not taking: Reported on 8/15/2023)       No current facility-administered medications for this visit.               Lab Results   Component Value Date/Time    CHOL 222 03/21/2022 02:25 PM    HDL 64 03/21/2022 02:25 PM    VLDL 20 02/26/2021 09:43 AM       Lab Results   Component Value Date/Time     01/06/2023 10:42 AM    K 4.0 01/06/2023 10:42 AM     01/06/2023 10:42 AM    CO2 31 01/06/2023 10:42 AM    BUN 12 01/06/2023 10:42 AM    GFRAA >60 09/26/2022 04:06 PM       Lab Results   Component

## 2023-08-17 RX ORDER — FLUTICASONE PROPIONATE AND SALMETEROL 250; 50 UG/1; UG/1
POWDER RESPIRATORY (INHALATION)
Qty: 60 EACH | Refills: 1 | Status: SHIPPED | OUTPATIENT
Start: 2023-08-17

## 2023-08-17 NOTE — TELEPHONE ENCOUNTER
Pt would like a refill     Re: fluticasone-salmeterol (ADVAIR) 250-50 MCG/ACT AEPB diskus inhaler     [facility] on [address] number to reach her is [phone]

## 2023-08-31 ENCOUNTER — HOSPITAL ENCOUNTER (OUTPATIENT)
Facility: HOSPITAL | Age: 48
Discharge: HOME | End: 2023-08-31
Attending: SPECIALIST
Payer: COMMERCIAL

## 2023-08-31 DIAGNOSIS — I10 PRIMARY HYPERTENSION: ICD-10-CM

## 2023-08-31 DIAGNOSIS — R94.39 ABNORMAL STRESS TEST: ICD-10-CM

## 2023-08-31 PROCEDURE — 6360000004 HC RX CONTRAST MEDICATION: Performed by: SPECIALIST

## 2023-08-31 PROCEDURE — 75574 CT ANGIO HRT W/3D IMAGE: CPT

## 2023-08-31 PROCEDURE — 75574 CT ANGIO HRT W/3D IMAGE: CPT | Performed by: INTERNAL MEDICINE

## 2023-08-31 RX ADMIN — IOPAMIDOL 100 ML: 755 INJECTION, SOLUTION INTRAVENOUS at 09:56

## 2023-09-21 RX ORDER — SODIUM CHLORIDE 9 MG/ML
INJECTION, SOLUTION INTRAVENOUS CONTINUOUS
OUTPATIENT
Start: 2023-09-28

## 2023-09-21 RX ORDER — SODIUM CHLORIDE 0.9 % (FLUSH) 0.9 %
5-40 SYRINGE (ML) INJECTION PRN
OUTPATIENT
Start: 2023-09-28

## 2023-09-21 RX ORDER — ACETAMINOPHEN 325 MG/1
650 TABLET ORAL
OUTPATIENT
Start: 2023-09-28

## 2023-09-21 RX ORDER — ALBUTEROL SULFATE 90 UG/1
4 AEROSOL, METERED RESPIRATORY (INHALATION) PRN
OUTPATIENT
Start: 2023-09-28

## 2023-09-21 RX ORDER — EPINEPHRINE 1 MG/ML
0.3 INJECTION, SOLUTION, CONCENTRATE INTRAVENOUS PRN
OUTPATIENT
Start: 2023-09-28

## 2023-09-21 RX ORDER — SODIUM CHLORIDE 9 MG/ML
5-250 INJECTION, SOLUTION INTRAVENOUS PRN
OUTPATIENT
Start: 2023-09-28

## 2023-09-21 RX ORDER — DIPHENHYDRAMINE HYDROCHLORIDE 50 MG/ML
50 INJECTION INTRAMUSCULAR; INTRAVENOUS
OUTPATIENT
Start: 2023-09-28

## 2023-09-21 RX ORDER — ONDANSETRON 2 MG/ML
8 INJECTION INTRAMUSCULAR; INTRAVENOUS
OUTPATIENT
Start: 2023-09-28

## 2023-09-28 ENCOUNTER — HOSPITAL ENCOUNTER (OUTPATIENT)
Facility: HOSPITAL | Age: 48
Setting detail: INFUSION SERIES
End: 2023-09-28
Payer: COMMERCIAL

## 2023-09-28 ENCOUNTER — APPOINTMENT (OUTPATIENT)
Dept: INFUSION THERAPY | Age: 48
End: 2023-09-28

## 2023-09-28 VITALS
TEMPERATURE: 98 F | OXYGEN SATURATION: 95 % | SYSTOLIC BLOOD PRESSURE: 146 MMHG | HEART RATE: 74 BPM | DIASTOLIC BLOOD PRESSURE: 83 MMHG | RESPIRATION RATE: 16 BRPM

## 2023-09-28 DIAGNOSIS — G35 ACUTE RELAPSING MULTIPLE SCLEROSIS (HCC): Primary | ICD-10-CM

## 2023-09-28 PROCEDURE — 96366 THER/PROPH/DIAG IV INF ADDON: CPT

## 2023-09-28 PROCEDURE — 96375 TX/PRO/DX INJ NEW DRUG ADDON: CPT

## 2023-09-28 PROCEDURE — 96365 THER/PROPH/DIAG IV INF INIT: CPT

## 2023-09-28 PROCEDURE — 96360 HYDRATION IV INFUSION INIT: CPT

## 2023-09-28 PROCEDURE — 96361 HYDRATE IV INFUSION ADD-ON: CPT

## 2023-09-28 PROCEDURE — 2580000003 HC RX 258: Performed by: PSYCHIATRY & NEUROLOGY

## 2023-09-28 PROCEDURE — 6370000000 HC RX 637 (ALT 250 FOR IP): Performed by: PSYCHIATRY & NEUROLOGY

## 2023-09-28 PROCEDURE — 6360000002 HC RX W HCPCS: Performed by: PSYCHIATRY & NEUROLOGY

## 2023-09-28 RX ORDER — DIPHENHYDRAMINE HYDROCHLORIDE 50 MG/ML
50 INJECTION INTRAMUSCULAR; INTRAVENOUS
OUTPATIENT
Start: 2023-09-28

## 2023-09-28 RX ORDER — EPINEPHRINE 1 MG/ML
0.3 INJECTION, SOLUTION INTRAMUSCULAR; SUBCUTANEOUS PRN
OUTPATIENT
Start: 2023-09-28

## 2023-09-28 RX ORDER — SODIUM CHLORIDE 9 MG/ML
INJECTION, SOLUTION INTRAVENOUS CONTINUOUS
OUTPATIENT
Start: 2023-09-28

## 2023-09-28 RX ORDER — SODIUM CHLORIDE 9 MG/ML
5-250 INJECTION, SOLUTION INTRAVENOUS PRN
Status: DISCONTINUED | OUTPATIENT
Start: 2023-09-28 | End: 2023-09-29 | Stop reason: HOSPADM

## 2023-09-28 RX ORDER — ALBUTEROL SULFATE 90 UG/1
4 AEROSOL, METERED RESPIRATORY (INHALATION) PRN
OUTPATIENT
Start: 2023-09-28

## 2023-09-28 RX ORDER — SODIUM CHLORIDE 0.9 % (FLUSH) 0.9 %
5-40 SYRINGE (ML) INJECTION PRN
OUTPATIENT
Start: 2023-09-28

## 2023-09-28 RX ORDER — ACETAMINOPHEN 325 MG/1
650 TABLET ORAL ONCE
Status: COMPLETED | OUTPATIENT
Start: 2023-09-28 | End: 2023-09-28

## 2023-09-28 RX ORDER — ACETAMINOPHEN 325 MG/1
650 TABLET ORAL
OUTPATIENT
Start: 2023-09-28

## 2023-09-28 RX ORDER — ACETAMINOPHEN 325 MG/1
650 TABLET ORAL ONCE
OUTPATIENT
Start: 2023-09-28 | End: 2023-09-28

## 2023-09-28 RX ORDER — DIPHENHYDRAMINE HCL 25 MG
50 CAPSULE ORAL ONCE
Status: COMPLETED | OUTPATIENT
Start: 2023-09-28 | End: 2023-09-28

## 2023-09-28 RX ORDER — SODIUM CHLORIDE 9 MG/ML
5-250 INJECTION, SOLUTION INTRAVENOUS PRN
OUTPATIENT
Start: 2023-09-28

## 2023-09-28 RX ORDER — DIPHENHYDRAMINE HCL 25 MG
50 CAPSULE ORAL ONCE
OUTPATIENT
Start: 2023-09-28 | End: 2023-09-28

## 2023-09-28 RX ORDER — ONDANSETRON 2 MG/ML
8 INJECTION INTRAMUSCULAR; INTRAVENOUS
OUTPATIENT
Start: 2023-09-28

## 2023-09-28 RX ADMIN — DIPHENHYDRAMINE HYDROCHLORIDE 50 MG: 25 CAPSULE ORAL at 10:24

## 2023-09-28 RX ADMIN — ACETAMINOPHEN 650 MG: 325 TABLET ORAL at 10:24

## 2023-09-28 RX ADMIN — OCRELIZUMAB 600 MG: 300 INJECTION INTRAVENOUS at 11:00

## 2023-09-28 RX ADMIN — SODIUM CHLORIDE 25 ML/HR: 9 INJECTION, SOLUTION INTRAVENOUS at 10:25

## 2023-09-28 RX ADMIN — METHYLPREDNISOLONE SODIUM SUCCINATE 125 MG: 125 INJECTION, POWDER, FOR SOLUTION INTRAMUSCULAR; INTRAVENOUS at 10:26

## 2023-09-28 ASSESSMENT — PAIN SCALES - GENERAL: PAINLEVEL_OUTOF10: 5

## 2023-09-28 ASSESSMENT — PAIN DESCRIPTION - LOCATION: LOCATION: BACK

## 2023-09-28 ASSESSMENT — PAIN DESCRIPTION - ORIENTATION: ORIENTATION: LOWER

## 2023-10-13 NOTE — TELEPHONE ENCOUNTER
Last appointment: 8/15/23, 6/19/23  Next appointment: 10/19/23  Previous refill encounter(s): 8/17/23 #1 with 1 refill    Requested Prescriptions     Pending Prescriptions Disp Refills    fluticasone-salmeterol (ADVAIR DISKUS) 250-50 MCG/ACT AEPB diskus inhaler [Pharmacy Med Name: Jorge Barragan 250-50 TXVUFO] 47 each 1     Sig: USE 1 INHALATION BY MOUTH EVERY 12 HOURS         For Pharmacy Admin Tracking Only    Program: Medication Refill  CPA in place:    Recommendation Provided To:    Intervention Detail: New Rx: 1, reason: Patient Preference  Intervention Accepted By:   Ariela Mcbride Closed?:    Time Spent (min): 5

## 2023-10-16 RX ORDER — FLUTICASONE PROPIONATE AND SALMETEROL 250; 50 UG/1; UG/1
POWDER RESPIRATORY (INHALATION)
Qty: 60 EACH | Refills: 0 | Status: SHIPPED | OUTPATIENT
Start: 2023-10-16 | End: 2023-11-20

## 2023-10-24 ENCOUNTER — TELEPHONE (OUTPATIENT)
Age: 48
End: 2023-10-24

## 2023-10-24 NOTE — TELEPHONE ENCOUNTER
Patient did not show for appointment today  Please call and see if they can reschedule to see Katherin De La Rosa or Jessica Davies in November

## 2023-10-30 ENCOUNTER — TELEPHONE (OUTPATIENT)
Age: 48
End: 2023-10-30

## 2023-11-20 RX ORDER — FLUTICASONE PROPIONATE AND SALMETEROL 250; 50 UG/1; UG/1
POWDER RESPIRATORY (INHALATION)
Qty: 60 EACH | Refills: 0 | Status: SHIPPED | OUTPATIENT
Start: 2023-11-20

## 2023-11-20 NOTE — TELEPHONE ENCOUNTER
Last appointment: 8/15/23  Next appointment: 10/30/23 pt cancelled appt  Previous refill encounter(s): 10/16/23 #1    Requested Prescriptions     Pending Prescriptions Disp Refills    fluticasone-salmeterol (ADVAIR DISKUS) 250-50 MCG/ACT AEPB diskus inhaler [Pharmacy Med Name: Daljit Argueta 250-50 DISKUS] 60 each 0     Sig: USE 1 INHALATION BY MOUTH EVERY 12 HOURS         For Pharmacy Admin Tracking Only    Program: Medication Refill  CPA in place:  Recommendation Provided To:    Intervention Detail: New Rx: 1, reason: Patient Preference  Intervention Accepted By:   Leana Umana Closed?:    Time Spent (min): 5

## 2023-11-20 NOTE — TELEPHONE ENCOUNTER
Pt last seen 08/2023,     She have no f/up apt    Needs f/up apt next available      Rubén Medrano MD  11/20/2023

## 2023-11-29 ENCOUNTER — TELEPHONE (OUTPATIENT)
Age: 48
End: 2023-11-29

## 2023-11-29 NOTE — TELEPHONE ENCOUNTER
Pt called needing a refill on amlodipine. Pt is scheduled for fu and med refill [email or web address]    Best contact info: [phone]

## 2024-01-15 DIAGNOSIS — Z79.899 ENCOUNTER FOR LONG-TERM (CURRENT) USE OF MEDICATIONS: ICD-10-CM

## 2024-01-15 DIAGNOSIS — Z13.1 SCREENING FOR DIABETES MELLITUS: ICD-10-CM

## 2024-01-15 DIAGNOSIS — I10 PRIMARY HYPERTENSION: ICD-10-CM

## 2024-01-15 DIAGNOSIS — F33.0 DEPRESSION, MAJOR, RECURRENT, MILD (HCC): ICD-10-CM

## 2024-01-15 DIAGNOSIS — E78.00 PURE HYPERCHOLESTEROLEMIA: ICD-10-CM

## 2024-01-16 LAB
ALBUMIN SERPL-MCNC: 3.7 G/DL (ref 3.5–5)
ALBUMIN/GLOB SERPL: 1.2 (ref 1.1–2.2)
ALP SERPL-CCNC: 66 U/L (ref 45–117)
ALT SERPL-CCNC: 42 U/L (ref 12–78)
ANION GAP SERPL CALC-SCNC: 7 MMOL/L (ref 5–15)
AST SERPL-CCNC: 28 U/L (ref 15–37)
BILIRUB SERPL-MCNC: 0.5 MG/DL (ref 0.2–1)
BUN SERPL-MCNC: 8 MG/DL (ref 6–20)
BUN/CREAT SERPL: 8 (ref 12–20)
CALCIUM SERPL-MCNC: 9.8 MG/DL (ref 8.5–10.1)
CHLORIDE SERPL-SCNC: 103 MMOL/L (ref 97–108)
CHOLEST SERPL-MCNC: 184 MG/DL
CO2 SERPL-SCNC: 32 MMOL/L (ref 21–32)
CREAT SERPL-MCNC: 0.97 MG/DL (ref 0.55–1.02)
ERYTHROCYTE [DISTWIDTH] IN BLOOD BY AUTOMATED COUNT: 12.9 % (ref 11.5–14.5)
EST. AVERAGE GLUCOSE BLD GHB EST-MCNC: 114 MG/DL
GLOBULIN SER CALC-MCNC: 3.2 G/DL (ref 2–4)
GLUCOSE SERPL-MCNC: 128 MG/DL (ref 65–100)
HBA1C MFR BLD: 5.6 % (ref 4–5.6)
HCT VFR BLD AUTO: 42.5 % (ref 35–47)
HDLC SERPL-MCNC: 42 MG/DL
HDLC SERPL: 4.4 (ref 0–5)
HGB BLD-MCNC: 14.4 G/DL (ref 11.5–16)
LDLC SERPL CALC-MCNC: 112.8 MG/DL (ref 0–100)
MCH RBC QN AUTO: 32.8 PG (ref 26–34)
MCHC RBC AUTO-ENTMCNC: 33.9 G/DL (ref 30–36.5)
MCV RBC AUTO: 96.8 FL (ref 80–99)
NRBC # BLD: 0 K/UL (ref 0–0.01)
NRBC BLD-RTO: 0 PER 100 WBC
PLATELET # BLD AUTO: 437 K/UL (ref 150–400)
PMV BLD AUTO: 10.2 FL (ref 8.9–12.9)
POTASSIUM SERPL-SCNC: 4.2 MMOL/L (ref 3.5–5.1)
PROT SERPL-MCNC: 6.9 G/DL (ref 6.4–8.2)
RBC # BLD AUTO: 4.39 M/UL (ref 3.8–5.2)
SODIUM SERPL-SCNC: 142 MMOL/L (ref 136–145)
TRIGL SERPL-MCNC: 146 MG/DL
TSH SERPL DL<=0.05 MIU/L-ACNC: 1.63 UIU/ML (ref 0.36–3.74)
VLDLC SERPL CALC-MCNC: 29.2 MG/DL
WBC # BLD AUTO: 9 K/UL (ref 3.6–11)

## 2024-01-24 DIAGNOSIS — K21.9 GASTROESOPHAGEAL REFLUX DISEASE, UNSPECIFIED WHETHER ESOPHAGITIS PRESENT: ICD-10-CM

## 2024-01-24 DIAGNOSIS — I10 PRIMARY HYPERTENSION: ICD-10-CM

## 2024-01-24 RX ORDER — OMEPRAZOLE 40 MG/1
CAPSULE, DELAYED RELEASE ORAL
Qty: 90 CAPSULE | Refills: 0 | Status: SHIPPED | OUTPATIENT
Start: 2024-01-24

## 2024-01-24 RX ORDER — AMLODIPINE BESYLATE 5 MG/1
5 TABLET ORAL DAILY
Qty: 90 TABLET | Refills: 0 | Status: SHIPPED | OUTPATIENT
Start: 2024-01-24

## 2024-02-14 ENCOUNTER — HOSPITAL ENCOUNTER (OUTPATIENT)
Facility: HOSPITAL | Age: 49
Discharge: HOME | End: 2024-02-17
Payer: MEDICAID

## 2024-02-14 VITALS — WEIGHT: 293 LBS | HEIGHT: 66 IN | BODY MASS INDEX: 47.09 KG/M2

## 2024-02-14 DIAGNOSIS — Z12.31 SCREENING MAMMOGRAM FOR HIGH-RISK PATIENT: ICD-10-CM

## 2024-02-14 PROCEDURE — 77063 BREAST TOMOSYNTHESIS BI: CPT

## 2024-02-19 ENCOUNTER — TELEMEDICINE (OUTPATIENT)
Age: 49
End: 2024-02-19
Payer: MEDICAID

## 2024-02-19 DIAGNOSIS — E78.00 PURE HYPERCHOLESTEROLEMIA: ICD-10-CM

## 2024-02-19 DIAGNOSIS — K21.9 GASTROESOPHAGEAL REFLUX DISEASE, UNSPECIFIED WHETHER ESOPHAGITIS PRESENT: ICD-10-CM

## 2024-02-19 DIAGNOSIS — J45.40 ASTHMA WITHOUT STATUS ASTHMATICUS, MODERATE PERSISTENT, UNCOMPLICATED: ICD-10-CM

## 2024-02-19 DIAGNOSIS — F33.0 DEPRESSION, MAJOR, RECURRENT, MILD (HCC): ICD-10-CM

## 2024-02-19 DIAGNOSIS — J45.41 MODERATE PERSISTENT ASTHMA WITH ACUTE EXACERBATION: Primary | ICD-10-CM

## 2024-02-19 DIAGNOSIS — E55.9 VITAMIN D DEFICIENCY: ICD-10-CM

## 2024-02-19 DIAGNOSIS — I10 PRIMARY HYPERTENSION: ICD-10-CM

## 2024-02-19 DIAGNOSIS — G44.229 CHRONIC TENSION-TYPE HEADACHE, NOT INTRACTABLE: ICD-10-CM

## 2024-02-19 PROCEDURE — 99214 OFFICE O/P EST MOD 30 MIN: CPT | Performed by: FAMILY MEDICINE

## 2024-02-19 RX ORDER — AMLODIPINE BESYLATE 5 MG/1
5 TABLET ORAL DAILY
Qty: 90 TABLET | Refills: 1 | Status: SHIPPED | OUTPATIENT
Start: 2024-02-19

## 2024-02-19 RX ORDER — HYDROCHLOROTHIAZIDE 25 MG/1
25 TABLET ORAL DAILY
Qty: 90 TABLET | Refills: 1 | Status: SHIPPED | OUTPATIENT
Start: 2024-02-19

## 2024-02-19 RX ORDER — DULOXETIN HYDROCHLORIDE 60 MG/1
60 CAPSULE, DELAYED RELEASE ORAL DAILY
Qty: 90 CAPSULE | Refills: 1 | Status: SHIPPED | OUTPATIENT
Start: 2024-02-19

## 2024-02-19 RX ORDER — MONTELUKAST SODIUM 10 MG/1
10 TABLET ORAL DAILY
Qty: 90 TABLET | Refills: 1 | Status: SHIPPED | OUTPATIENT
Start: 2024-02-19

## 2024-02-19 RX ORDER — PREDNISONE 10 MG/1
TABLET ORAL
Qty: 21 EACH | Refills: 0 | Status: SHIPPED | OUTPATIENT
Start: 2024-02-19

## 2024-02-19 RX ORDER — ERGOCALCIFEROL 1.25 MG/1
50000 CAPSULE ORAL
Qty: 12 CAPSULE | Refills: 1 | Status: SHIPPED | OUTPATIENT
Start: 2024-02-19

## 2024-02-19 RX ORDER — ALBUTEROL SULFATE 90 UG/1
AEROSOL, METERED RESPIRATORY (INHALATION)
Qty: 18 G | Refills: 2 | Status: SHIPPED | OUTPATIENT
Start: 2024-02-19

## 2024-02-19 RX ORDER — LOSARTAN POTASSIUM 100 MG/1
100 TABLET ORAL DAILY
Qty: 90 TABLET | Refills: 1 | Status: SHIPPED | OUTPATIENT
Start: 2024-02-19

## 2024-02-19 RX ORDER — OMEPRAZOLE 40 MG/1
40 CAPSULE, DELAYED RELEASE ORAL DAILY
Qty: 90 CAPSULE | Refills: 0 | Status: SHIPPED | OUTPATIENT
Start: 2024-02-19

## 2024-02-19 RX ORDER — ATORVASTATIN CALCIUM 10 MG/1
10 TABLET, FILM COATED ORAL NIGHTLY
Qty: 90 TABLET | Refills: 1 | Status: SHIPPED | OUTPATIENT
Start: 2024-02-19

## 2024-02-19 RX ORDER — FLUTICASONE PROPIONATE AND SALMETEROL 250; 50 UG/1; UG/1
POWDER RESPIRATORY (INHALATION)
Qty: 60 EACH | Refills: 2 | Status: SHIPPED | OUTPATIENT
Start: 2024-02-19

## 2024-02-19 RX ORDER — PROPRANOLOL HCL 60 MG
60 CAPSULE, EXTENDED RELEASE 24HR ORAL DAILY
Qty: 90 CAPSULE | Refills: 1 | Status: SHIPPED | OUTPATIENT
Start: 2024-02-19

## 2024-02-19 ASSESSMENT — PATIENT HEALTH QUESTIONNAIRE - PHQ9
SUM OF ALL RESPONSES TO PHQ QUESTIONS 1-9: 14
SUM OF ALL RESPONSES TO PHQ9 QUESTIONS 1 & 2: 1
2. FEELING DOWN, DEPRESSED OR HOPELESS: 0
9. THOUGHTS THAT YOU WOULD BE BETTER OFF DEAD, OR OF HURTING YOURSELF: 0
7. TROUBLE CONCENTRATING ON THINGS, SUCH AS READING THE NEWSPAPER OR WATCHING TELEVISION: 3
SUM OF ALL RESPONSES TO PHQ QUESTIONS 1-9: 14
3. TROUBLE FALLING OR STAYING ASLEEP: 3
5. POOR APPETITE OR OVEREATING: 3
6. FEELING BAD ABOUT YOURSELF - OR THAT YOU ARE A FAILURE OR HAVE LET YOURSELF OR YOUR FAMILY DOWN: 0
10. IF YOU CHECKED OFF ANY PROBLEMS, HOW DIFFICULT HAVE THESE PROBLEMS MADE IT FOR YOU TO DO YOUR WORK, TAKE CARE OF THINGS AT HOME, OR GET ALONG WITH OTHER PEOPLE: 1
SUM OF ALL RESPONSES TO PHQ QUESTIONS 1-9: 14
SUM OF ALL RESPONSES TO PHQ QUESTIONS 1-9: 14
4. FEELING TIRED OR HAVING LITTLE ENERGY: 3
8. MOVING OR SPEAKING SO SLOWLY THAT OTHER PEOPLE COULD HAVE NOTICED. OR THE OPPOSITE, BEING SO FIGETY OR RESTLESS THAT YOU HAVE BEEN MOVING AROUND A LOT MORE THAN USUAL: 1
1. LITTLE INTEREST OR PLEASURE IN DOING THINGS: 1

## 2024-02-19 NOTE — PROGRESS NOTES
Chief Complaint   Patient presents with    Follow-up Chronic Condition    Asthma     Check meds    1. Have you been to the ER, urgent care clinic since your last visit?  Hospitalized since your last visit?No    2. Have you seen or consulted any other health care providers outside of the [facility] since your last visit?  Include any pap smears or colon screening. Yes      
lesions or discoloration noted on facial skin         [] Abnormal-            Psychiatric:       [x] Normal Affect [x] No Hallucinations        [] Abnormal-     Other pertinent observable physical exam findings-     ASSESSMENT/PLAN:  Chiara was seen today for follow-up chronic condition and asthma.    Diagnoses and all orders for this visit:    Moderate persistent asthma with acute exacerbation  -     predniSONE 10 MG (21) TBPK; Follow taper dose instruction  -     fluticasone-salmeterol (ADVAIR DISKUS) 250-50 MCG/ACT AEPB diskus inhaler; USE 1 INHALATION BY MOUTH EVERY 12 HOURS    Asthma without status asthmaticus, moderate persistent, uncomplicated  -     predniSONE 10 MG (21) TBPK; Follow taper dose instruction  -     albuterol sulfate HFA (PROVENTIL;VENTOLIN;PROAIR) 108 (90 Base) MCG/ACT inhaler; INHALE 2 PUFFS EVERY 4 HOURS AS NEEDED FOR WHEEZING OR SHORTNESS OF BREATH AND COUGH  -     fluticasone-salmeterol (ADVAIR DISKUS) 250-50 MCG/ACT AEPB diskus inhaler; USE 1 INHALATION BY MOUTH EVERY 12 HOURS  -     montelukast (SINGULAIR) 10 MG tablet; Take 1 tablet by mouth daily    Primary hypertension  -     amLODIPine (NORVASC) 5 MG tablet; Take 1 tablet by mouth daily  -     hydroCHLOROthiazide (HYDRODIURIL) 25 MG tablet; Take 1 tablet by mouth daily  -     losartan (COZAAR) 100 MG tablet; Take 1 tablet by mouth daily  -     propranolol (INDERAL LA) 60 MG extended release capsule; Take 1 capsule by mouth daily    Pure hypercholesterolemia  -     atorvastatin (LIPITOR) 10 MG tablet; Take 1 tablet by mouth at bedtime    Depression, major, recurrent, mild (HCC)  -     DULoxetine (CYMBALTA) 60 MG extended release capsule; Take 1 capsule by mouth daily    Vitamin D deficiency  -     ergocalciferol (ERGOCALCIFEROL) 1.25 MG (35771 UT) capsule; Take 1 capsule by mouth every 7 days    Gastroesophageal reflux disease, unspecified whether esophagitis present  -     omeprazole (PRILOSEC) 40 MG delayed release capsule; Take

## 2024-02-22 DIAGNOSIS — G43.009 MIGRAINE WITHOUT AURA, NOT INTRACTABLE, WITHOUT STATUS MIGRAINOSUS: ICD-10-CM

## 2024-02-27 RX ORDER — BUTALBITAL, ACETAMINOPHEN AND CAFFEINE 50; 325; 40 MG/1; MG/1; MG/1
TABLET ORAL
Qty: 30 TABLET | Refills: 1 | Status: SHIPPED | OUTPATIENT
Start: 2024-02-27

## 2024-03-01 ENCOUNTER — TELEPHONE (OUTPATIENT)
Age: 49
End: 2024-03-01

## 2024-03-01 DIAGNOSIS — G43.009 MIGRAINE WITHOUT AURA, NOT INTRACTABLE, WITHOUT STATUS MIGRAINOSUS: ICD-10-CM

## 2024-03-01 DIAGNOSIS — F33.0 DEPRESSION, MAJOR, RECURRENT, MILD (HCC): ICD-10-CM

## 2024-03-01 RX ORDER — DULOXETIN HYDROCHLORIDE 60 MG/1
60 CAPSULE, DELAYED RELEASE ORAL DAILY
Qty: 90 CAPSULE | Refills: 1 | Status: CANCELLED | OUTPATIENT
Start: 2024-03-01

## 2024-03-01 RX ORDER — BUTALBITAL, ACETAMINOPHEN AND CAFFEINE 50; 325; 40 MG/1; MG/1; MG/1
TABLET ORAL
Qty: 30 TABLET | Refills: 1 | Status: CANCELLED | OUTPATIENT
Start: 2024-03-01

## 2024-03-01 NOTE — TELEPHONE ENCOUNTER
Fioricet was sent on 2/27/24 for #30 with 1 refill.  Cymbalta was sent on 2/19/24 for #90 with 1 refill.      For Pharmacy Admin Tracking Only    Program: Medication Refill  CPA in place:    Recommendation Provided To:   Intervention Detail: Discontinued Rx: 2, reason: Duplicate Therapy  Intervention Accepted By:   Gap Closed?:    Time Spent (min): 5

## 2024-03-01 NOTE — TELEPHONE ENCOUNTER
----- Message from Clarita Haas MD sent at 2/29/2024  3:34 PM EST -----  Regarding: FW: Pronoprolol interaction with asthma/breathing complications   Contact: [phone]  She needs apt in a couple of weeks f/up for Pneumonia.     Thx    Clarita Haas MD    2/29/2024    ----- Message -----  From: Jennifer Grande LPN  Sent: 2/29/2024   3:16 PM EST  To: Clarita Haas MD  Subject: FW: Pronoprolol interaction with asthma/tunde#      ----- Message -----  From: Chiara Bacon  Sent: 2/29/2024   2:16 PM EST  To: #  Subject: Pronoprolol interaction with asthma/breathin#    Ok thanks..     Also, I was diagnosed with pneumonia on last Saturday 2/24/24. [facility]. They xrayed my lungs and diagnosed with pneumonia. I am on 21 days of antibiotics and 7 days of steroids.     Please let m know when I need to follow up with you and get my lungs checked again. Could you also give me an extension on my doctor's note? [facility] gave me 3 days but I need a little longer to recover or just not to go in the office. I can work from home.    Chiara Bacon  [phone]

## 2024-03-13 ENCOUNTER — HOSPITAL ENCOUNTER (OUTPATIENT)
Facility: HOSPITAL | Age: 49
Discharge: HOME | End: 2024-03-16
Payer: MEDICAID

## 2024-03-13 ENCOUNTER — OFFICE VISIT (OUTPATIENT)
Age: 49
End: 2024-03-13
Payer: MEDICAID

## 2024-03-13 VITALS
SYSTOLIC BLOOD PRESSURE: 121 MMHG | DIASTOLIC BLOOD PRESSURE: 82 MMHG | HEIGHT: 66 IN | WEIGHT: 293 LBS | BODY MASS INDEX: 47.09 KG/M2 | RESPIRATION RATE: 18 BRPM | HEART RATE: 81 BPM | TEMPERATURE: 97.3 F | OXYGEN SATURATION: 94 %

## 2024-03-13 DIAGNOSIS — J18.9 PNEUMONIA DUE TO INFECTIOUS ORGANISM, UNSPECIFIED LATERALITY, UNSPECIFIED PART OF LUNG: Primary | ICD-10-CM

## 2024-03-13 DIAGNOSIS — J45.41 MODERATE PERSISTENT ASTHMA WITH EXACERBATION: ICD-10-CM

## 2024-03-13 DIAGNOSIS — J18.9 PNEUMONIA DUE TO INFECTIOUS ORGANISM, UNSPECIFIED LATERALITY, UNSPECIFIED PART OF LUNG: ICD-10-CM

## 2024-03-13 DIAGNOSIS — D84.9 IMMUNOSUPPRESSED STATUS (HCC): ICD-10-CM

## 2024-03-13 PROCEDURE — 3079F DIAST BP 80-89 MM HG: CPT | Performed by: FAMILY MEDICINE

## 2024-03-13 PROCEDURE — 71046 X-RAY EXAM CHEST 2 VIEWS: CPT

## 2024-03-13 PROCEDURE — 99213 OFFICE O/P EST LOW 20 MIN: CPT | Performed by: FAMILY MEDICINE

## 2024-03-13 PROCEDURE — 3074F SYST BP LT 130 MM HG: CPT | Performed by: FAMILY MEDICINE

## 2024-03-13 RX ORDER — FLUTICASONE FUROATE, UMECLIDINIUM BROMIDE AND VILANTEROL TRIFENATATE 200; 62.5; 25 UG/1; UG/1; UG/1
1 POWDER RESPIRATORY (INHALATION) DAILY
Qty: 1 EACH | Refills: 2 | Status: SHIPPED | OUTPATIENT
Start: 2024-03-13

## 2024-03-13 RX ORDER — PREDNISONE 10 MG/1
TABLET ORAL
Qty: 30 TABLET | Refills: 0 | Status: SHIPPED | OUTPATIENT
Start: 2024-03-13

## 2024-03-13 NOTE — PROGRESS NOTES
Chief Complaint   Patient presents with    Pneumonia     Seen at [facility].    1. Have you been to the ER, urgent care clinic since your last visit?  Hospitalized since your last visit?Yes [facility]    2. Have you seen or consulted any other health care providers outside of the [facility] since your last visit?  Include any pap smears or colon screening. Yes      
(OCREVUS) 300 MG/10ML SOLN injection Infuse intravenously       No current facility-administered medications on file prior to visit.     Patient Active Problem List   Diagnosis    Anemia    H/O tubal ligation    Menorrhagia with regular cycle    Iron deficiency anemia due to chronic blood loss    Obesity, Class III, BMI 40-49.9 (morbid obesity) (HCC)    BMI 45.0-49.9, adult ([facility])    Mild intermittent asthma without complication    Depression, major, recurrent, mild ([facility])    Encounter for long-term (current) use of medications    Gastroesophageal reflux disease without esophagitis    Acute relapsing multiple sclerosis ([facility])    Migraine without aura and without status migrainosus, not intractable    S/P endometrial ablation    Heavy menstrual period    Multiple sclerosis ([facility])    Vitamin D deficiency    Primary hypertension    Chronic bilateral low back pain with sciatica    Foraminal stenosis of lumbar region    Pure hypercholesterolemia    Asthma without status asthmaticus, moderate persistent, uncomplicated    EDUARDO on CPAP       Clarita Haas MD  3/13/2024

## 2024-03-18 DIAGNOSIS — J45.41 MODERATE PERSISTENT ASTHMA WITH EXACERBATION: Primary | ICD-10-CM

## 2024-03-18 RX ORDER — FLUTICASONE PROPIONATE AND SALMETEROL 250; 50 UG/1; UG/1
1 POWDER RESPIRATORY (INHALATION) EVERY 12 HOURS
Qty: 60 EACH | Refills: 3 | Status: SHIPPED | OUTPATIENT
Start: 2024-03-18

## 2024-03-28 ENCOUNTER — HOSPITAL ENCOUNTER (OUTPATIENT)
Facility: HOSPITAL | Age: 49
Setting detail: INFUSION SERIES
Discharge: HOME | End: 2024-03-28
Payer: MEDICAID

## 2024-03-28 VITALS
HEIGHT: 66 IN | SYSTOLIC BLOOD PRESSURE: 112 MMHG | HEART RATE: 69 BPM | RESPIRATION RATE: 18 BRPM | WEIGHT: 293 LBS | TEMPERATURE: 97.2 F | DIASTOLIC BLOOD PRESSURE: 79 MMHG | BODY MASS INDEX: 47.09 KG/M2 | OXYGEN SATURATION: 94 %

## 2024-03-28 DIAGNOSIS — G35 ACUTE RELAPSING MULTIPLE SCLEROSIS (HCC): Primary | ICD-10-CM

## 2024-03-28 PROCEDURE — 96366 THER/PROPH/DIAG IV INF ADDON: CPT

## 2024-03-28 PROCEDURE — 6360000002 HC RX W HCPCS: Performed by: PSYCHIATRY & NEUROLOGY

## 2024-03-28 PROCEDURE — 96375 TX/PRO/DX INJ NEW DRUG ADDON: CPT

## 2024-03-28 PROCEDURE — 2580000003 HC RX 258: Performed by: PSYCHIATRY & NEUROLOGY

## 2024-03-28 PROCEDURE — 6370000000 HC RX 637 (ALT 250 FOR IP): Performed by: PSYCHIATRY & NEUROLOGY

## 2024-03-28 PROCEDURE — 96365 THER/PROPH/DIAG IV INF INIT: CPT

## 2024-03-28 RX ORDER — EPINEPHRINE 1 MG/ML
0.3 INJECTION, SOLUTION INTRAMUSCULAR; SUBCUTANEOUS PRN
OUTPATIENT
Start: 2024-09-26

## 2024-03-28 RX ORDER — SODIUM CHLORIDE 9 MG/ML
5-250 INJECTION, SOLUTION INTRAVENOUS PRN
OUTPATIENT
Start: 2024-09-26

## 2024-03-28 RX ORDER — ACETAMINOPHEN 325 MG/1
650 TABLET ORAL ONCE
Status: COMPLETED | OUTPATIENT
Start: 2024-03-28 | End: 2024-03-28

## 2024-03-28 RX ORDER — DIPHENHYDRAMINE HCL 25 MG
50 CAPSULE ORAL ONCE
Status: COMPLETED | OUTPATIENT
Start: 2024-03-28 | End: 2024-03-28

## 2024-03-28 RX ORDER — DIPHENHYDRAMINE HYDROCHLORIDE 50 MG/ML
50 INJECTION INTRAMUSCULAR; INTRAVENOUS
OUTPATIENT
Start: 2024-09-26

## 2024-03-28 RX ORDER — ALBUTEROL SULFATE 90 UG/1
4 AEROSOL, METERED RESPIRATORY (INHALATION) PRN
OUTPATIENT
Start: 2024-09-26

## 2024-03-28 RX ORDER — DIPHENHYDRAMINE HCL 25 MG
50 CAPSULE ORAL ONCE
OUTPATIENT
Start: 2024-09-26 | End: 2024-09-26

## 2024-03-28 RX ORDER — ACETAMINOPHEN 325 MG/1
650 TABLET ORAL
OUTPATIENT
Start: 2024-09-26

## 2024-03-28 RX ORDER — SODIUM CHLORIDE 9 MG/ML
5-250 INJECTION, SOLUTION INTRAVENOUS PRN
Status: DISCONTINUED | OUTPATIENT
Start: 2024-03-28 | End: 2024-03-29 | Stop reason: HOSPADM

## 2024-03-28 RX ORDER — SODIUM CHLORIDE 0.9 % (FLUSH) 0.9 %
5-40 SYRINGE (ML) INJECTION PRN
Status: DISCONTINUED | OUTPATIENT
Start: 2024-03-28 | End: 2024-03-29 | Stop reason: HOSPADM

## 2024-03-28 RX ORDER — ACETAMINOPHEN 325 MG/1
650 TABLET ORAL ONCE
OUTPATIENT
Start: 2024-09-26 | End: 2024-09-26

## 2024-03-28 RX ORDER — ONDANSETRON 2 MG/ML
8 INJECTION INTRAMUSCULAR; INTRAVENOUS
OUTPATIENT
Start: 2024-09-26

## 2024-03-28 RX ORDER — SODIUM CHLORIDE 9 MG/ML
INJECTION, SOLUTION INTRAVENOUS CONTINUOUS
OUTPATIENT
Start: 2024-09-26

## 2024-03-28 RX ORDER — SODIUM CHLORIDE 0.9 % (FLUSH) 0.9 %
5-40 SYRINGE (ML) INJECTION PRN
OUTPATIENT
Start: 2024-09-26

## 2024-03-28 RX ADMIN — SODIUM CHLORIDE 25 ML/HR: 9 INJECTION, SOLUTION INTRAVENOUS at 11:15

## 2024-03-28 RX ADMIN — ACETAMINOPHEN 650 MG: 325 TABLET ORAL at 10:47

## 2024-03-28 RX ADMIN — WATER 125 MG: 1 INJECTION INTRAMUSCULAR; INTRAVENOUS; SUBCUTANEOUS at 10:48

## 2024-03-28 RX ADMIN — DIPHENHYDRAMINE HYDROCHLORIDE 50 MG: 25 CAPSULE ORAL at 10:47

## 2024-03-28 RX ADMIN — OCRELIZUMAB 600 MG: 300 INJECTION INTRAVENOUS at 11:20

## 2024-03-28 ASSESSMENT — PAIN SCALES - GENERAL: PAINLEVEL_OUTOF10: 3

## 2024-03-28 ASSESSMENT — PAIN DESCRIPTION - LOCATION: LOCATION: HEAD

## 2024-03-28 NOTE — PROGRESS NOTES
Name: Chiara Bacon    MRN: [id number]         : 1975    Ms. Bacon Arrived ambulatory and in no distress for Ocrevus Regimen.  Assessment was completed, no acute issues at this time, patient did say she had pneumonia but all steroids and antibiotics have been completed. Shes also c/o mild numbness and tingling in BLE and still some SOB with exertion. PIV 24 G placed in left AC without difficulty. Positive blood return noted. Patient had no labs or MD appointment today.         Patient Vitals for the past 24 hrs:   BP Temp Temp src Pulse Resp SpO2 Height Weight   24 1357 112/79 -- -- 69 -- -- -- --   24 1230 116/73 -- -- 62 -- -- -- --   24 1153 119/79 -- -- 65 -- -- -- --   24 1137 130/67 -- -- 61 -- -- -- --   24 1121 113/72 -- -- 64 -- -- -- --   24 1014 (!) 146/91 97.2 °F (36.2 °C) Temporal 75 18 94 % 1.676 m (5' 6\") (!) 150.8 kg (332 lb 6.4 oz)            Medications:  Medications Administered         0.9 % sodium chloride infusion Admin Date  2024 Action  New Bag Dose  25 mL/hr Rate  25 mL/hr Route  IntraVENous Administered By  Kennedi Morocho RN        acetaminophen (TYLENOL) tablet 650 mg Admin Date  2024 Action  Given Dose  650 mg Rate   Route  Oral Administered By  Kennedi Morocho RN        diphenhydrAMINE (BENADRYL) capsule 50 mg Admin Date  2024 Action  Given Dose  50 mg Rate   Route  Oral Administered By  Kennedi Morocho RN        methylPREDNISolone sodium succ (SOLU-MEDROL) 125 mg in sterile water 2 mL injection Admin Date  2024 Action  Given Dose  125 mg Rate   Route  IntraVENous Administered By  Kennedi Morocho RN        ocrelizumab (OCREVUS) 600 mg in sodium chloride 0.9 % 500 mL IVPB Admin Date  2024 Action  New Bag Dose  600 mg Rate  100 mL/hr Route  IntraVENous Administered By  Kennedi Morocho, LUIS        ocrelizumab (OCREVUS) 600 mg in sodium chloride 0.9 % 500 mL IVPB Admin Date  2024 Action  Rate/Dose

## 2024-03-29 ENCOUNTER — OFFICE VISIT (OUTPATIENT)
Age: 49
End: 2024-03-29
Payer: MEDICAID

## 2024-03-29 VITALS
WEIGHT: 293 LBS | HEIGHT: 66 IN | BODY MASS INDEX: 47.09 KG/M2 | SYSTOLIC BLOOD PRESSURE: 134 MMHG | DIASTOLIC BLOOD PRESSURE: 88 MMHG | HEART RATE: 84 BPM | OXYGEN SATURATION: 96 %

## 2024-03-29 DIAGNOSIS — R06.02 SOB (SHORTNESS OF BREATH): ICD-10-CM

## 2024-03-29 DIAGNOSIS — R94.39 ABNORMAL STRESS TEST: ICD-10-CM

## 2024-03-29 DIAGNOSIS — I10 PRIMARY HYPERTENSION: Primary | ICD-10-CM

## 2024-03-29 PROCEDURE — 93005 ELECTROCARDIOGRAM TRACING: CPT | Performed by: SPECIALIST

## 2024-03-29 PROCEDURE — 3075F SYST BP GE 130 - 139MM HG: CPT | Performed by: SPECIALIST

## 2024-03-29 PROCEDURE — 3079F DIAST BP 80-89 MM HG: CPT | Performed by: SPECIALIST

## 2024-03-29 PROCEDURE — 93010 ELECTROCARDIOGRAM REPORT: CPT | Performed by: SPECIALIST

## 2024-03-29 PROCEDURE — 99214 OFFICE O/P EST MOD 30 MIN: CPT | Performed by: SPECIALIST

## 2024-03-29 ASSESSMENT — PATIENT HEALTH QUESTIONNAIRE - PHQ9
3. TROUBLE FALLING OR STAYING ASLEEP: NOT AT ALL
SUM OF ALL RESPONSES TO PHQ9 QUESTIONS 1 & 2: 1
SUM OF ALL RESPONSES TO PHQ QUESTIONS 1-9: 1
2. FEELING DOWN, DEPRESSED OR HOPELESS: SEVERAL DAYS
1. LITTLE INTEREST OR PLEASURE IN DOING THINGS: NOT AT ALL
10. IF YOU CHECKED OFF ANY PROBLEMS, HOW DIFFICULT HAVE THESE PROBLEMS MADE IT FOR YOU TO DO YOUR WORK, TAKE CARE OF THINGS AT HOME, OR GET ALONG WITH OTHER PEOPLE: NOT DIFFICULT AT ALL
6. FEELING BAD ABOUT YOURSELF - OR THAT YOU ARE A FAILURE OR HAVE LET YOURSELF OR YOUR FAMILY DOWN: NOT AT ALL
7. TROUBLE CONCENTRATING ON THINGS, SUCH AS READING THE NEWSPAPER OR WATCHING TELEVISION: NOT AT ALL
8. MOVING OR SPEAKING SO SLOWLY THAT OTHER PEOPLE COULD HAVE NOTICED. OR THE OPPOSITE, BEING SO FIGETY OR RESTLESS THAT YOU HAVE BEEN MOVING AROUND A LOT MORE THAN USUAL: NOT AT ALL
4. FEELING TIRED OR HAVING LITTLE ENERGY: NOT AT ALL
9. THOUGHTS THAT YOU WOULD BE BETTER OFF DEAD, OR OF HURTING YOURSELF: NOT AT ALL
5. POOR APPETITE OR OVEREATING: NOT AT ALL
SUM OF ALL RESPONSES TO PHQ QUESTIONS 1-9: 1

## 2024-03-29 NOTE — PROGRESS NOTES
Palpations: Abdomen is soft.   Musculoskeletal:      Right lower leg: No edema.      Left lower leg: No edema.          Current Outpatient Medications   Medication Sig Dispense Refill    fluticasone-salmeterol (WIXELA INHUB) 250-50 MCG/ACT AEPB diskus inhaler Inhale 1 puff into the lungs in the morning and 1 puff in the evening. 60 each 3    butalbital-acetaminophen-caffeine (FIORICET, ESGIC) -40 MG per tablet TAKE 1 TABLET BY MOUTH EVERY 6 HOURS AS NEEDED FOR MIGRAINE FOR UP TO 30 DOSES. MAX 6/24 HOURS 30 tablet 1    albuterol sulfate HFA (PROVENTIL;VENTOLIN;PROAIR) 108 (90 Base) MCG/ACT inhaler INHALE 2 PUFFS EVERY 4 HOURS AS NEEDED FOR WHEEZING OR SHORTNESS OF BREATH AND COUGH 18 g 2    amLODIPine (NORVASC) 5 MG tablet Take 1 tablet by mouth daily 90 tablet 1    atorvastatin (LIPITOR) 10 MG tablet Take 1 tablet by mouth at bedtime 90 tablet 1    DULoxetine (CYMBALTA) 60 MG extended release capsule Take 1 capsule by mouth daily 90 capsule 1    ergocalciferol (ERGOCALCIFEROL) 1.25 MG (98189 UT) capsule Take 1 capsule by mouth every 7 days 12 capsule 1    hydroCHLOROthiazide (HYDRODIURIL) 25 MG tablet Take 1 tablet by mouth daily 90 tablet 1    losartan (COZAAR) 100 MG tablet Take 1 tablet by mouth daily 90 tablet 1    montelukast (SINGULAIR) 10 MG tablet Take 1 tablet by mouth daily 90 tablet 1    omeprazole (PRILOSEC) 40 MG delayed release capsule Take 1 capsule by mouth daily 90 capsule 0    propranolol (INDERAL LA) 60 MG extended release capsule Take 1 capsule by mouth daily 90 capsule 1    albuterol (PROVENTIL) (2.5 MG/3ML) 0.083% nebulizer solution Inhale into the lungs every 4 hours as needed      cetirizine (ZYRTEC) 10 MG tablet Take by mouth daily      fluticasone (FLONASE) 50 MCG/ACT nasal spray SHAKE LIQUID AND USE 2 SPRAYS IN EACH NOSTRIL DAILY      gabapentin (NEURONTIN) 600 MG tablet Take by mouth 3 times daily.      ibuprofen-famotidine 800-26.6 MG TABS Duexis 800 mg-26.6 mg tablet

## 2024-04-02 DIAGNOSIS — R06.09 DOE (DYSPNEA ON EXERTION): ICD-10-CM

## 2024-04-02 DIAGNOSIS — R06.02 SOB (SHORTNESS OF BREATH): Primary | ICD-10-CM

## 2024-05-05 DIAGNOSIS — F33.0 DEPRESSION, MAJOR, RECURRENT, MILD (HCC): ICD-10-CM

## 2024-05-06 RX ORDER — DULOXETIN HYDROCHLORIDE 60 MG/1
CAPSULE, DELAYED RELEASE ORAL DAILY
Qty: 90 CAPSULE | Refills: 2 | OUTPATIENT
Start: 2024-05-06

## 2024-05-06 NOTE — TELEPHONE ENCOUNTER
Cymbalta was sent on 2/19/24 for #90 with 1 refill    For Pharmacy Admin Tracking Only    Program: Medication Refill  CPA in place:    Recommendation Provided To:   Intervention Detail: Discontinued Rx: 1, reason: Duplicate Therapy  Intervention Accepted By:   Gap Closed?:    Time Spent (min): 5

## 2024-05-09 ENCOUNTER — OFFICE VISIT (OUTPATIENT)
Age: 49
End: 2024-05-09
Payer: MEDICAID

## 2024-05-09 VITALS
OXYGEN SATURATION: 94 % | TEMPERATURE: 97.1 F | BODY MASS INDEX: 53.59 KG/M2 | WEIGHT: 293 LBS | HEART RATE: 67 BPM | DIASTOLIC BLOOD PRESSURE: 90 MMHG | SYSTOLIC BLOOD PRESSURE: 124 MMHG | RESPIRATION RATE: 16 BRPM

## 2024-05-09 DIAGNOSIS — Z79.899 LONG-TERM USE OF HIGH-RISK MEDICATION: ICD-10-CM

## 2024-05-09 DIAGNOSIS — E78.00 PURE HYPERCHOLESTEROLEMIA: ICD-10-CM

## 2024-05-09 DIAGNOSIS — R53.82 CHRONIC FATIGUE: ICD-10-CM

## 2024-05-09 DIAGNOSIS — J45.40 ASTHMA WITHOUT STATUS ASTHMATICUS, MODERATE PERSISTENT, UNCOMPLICATED: ICD-10-CM

## 2024-05-09 DIAGNOSIS — G44.229 CHRONIC TENSION-TYPE HEADACHE, NOT INTRACTABLE: ICD-10-CM

## 2024-05-09 DIAGNOSIS — F33.0 DEPRESSION, MAJOR, RECURRENT, MILD (HCC): Primary | ICD-10-CM

## 2024-05-09 DIAGNOSIS — I10 PRIMARY HYPERTENSION: ICD-10-CM

## 2024-05-09 DIAGNOSIS — E55.9 VITAMIN D DEFICIENCY: ICD-10-CM

## 2024-05-09 DIAGNOSIS — K21.9 GASTROESOPHAGEAL REFLUX DISEASE, UNSPECIFIED WHETHER ESOPHAGITIS PRESENT: ICD-10-CM

## 2024-05-09 PROCEDURE — 99214 OFFICE O/P EST MOD 30 MIN: CPT | Performed by: FAMILY MEDICINE

## 2024-05-09 PROCEDURE — 3074F SYST BP LT 130 MM HG: CPT | Performed by: FAMILY MEDICINE

## 2024-05-09 PROCEDURE — 3080F DIAST BP >= 90 MM HG: CPT | Performed by: FAMILY MEDICINE

## 2024-05-09 RX ORDER — FLUTICASONE PROPIONATE AND SALMETEROL 250; 50 UG/1; UG/1
1 POWDER RESPIRATORY (INHALATION) EVERY 12 HOURS
Qty: 60 EACH | Refills: 3 | Status: SHIPPED | OUTPATIENT
Start: 2024-05-09

## 2024-05-09 RX ORDER — DULOXETIN HYDROCHLORIDE 60 MG/1
60 CAPSULE, DELAYED RELEASE ORAL DAILY
Qty: 90 CAPSULE | Refills: 1 | Status: SHIPPED | OUTPATIENT
Start: 2024-05-09

## 2024-05-09 RX ORDER — PROPRANOLOL HCL 60 MG
60 CAPSULE, EXTENDED RELEASE 24HR ORAL DAILY
Qty: 90 CAPSULE | Refills: 1 | Status: SHIPPED | OUTPATIENT
Start: 2024-05-09

## 2024-05-09 RX ORDER — ALBUTEROL SULFATE 90 UG/1
AEROSOL, METERED RESPIRATORY (INHALATION)
Qty: 18 G | Refills: 2 | Status: SHIPPED | OUTPATIENT
Start: 2024-05-09

## 2024-05-09 RX ORDER — MONTELUKAST SODIUM 10 MG/1
10 TABLET ORAL DAILY
Qty: 90 TABLET | Refills: 1 | Status: SHIPPED | OUTPATIENT
Start: 2024-05-09

## 2024-05-09 RX ORDER — HYDROCHLOROTHIAZIDE 25 MG/1
25 TABLET ORAL DAILY
Qty: 90 TABLET | Refills: 1 | Status: SHIPPED | OUTPATIENT
Start: 2024-05-09

## 2024-05-09 RX ORDER — ERGOCALCIFEROL 1.25 MG/1
50000 CAPSULE ORAL
Qty: 12 CAPSULE | Refills: 1 | Status: SHIPPED | OUTPATIENT
Start: 2024-05-09

## 2024-05-09 RX ORDER — AMLODIPINE BESYLATE 5 MG/1
5 TABLET ORAL DAILY
Qty: 90 TABLET | Refills: 1 | Status: SHIPPED | OUTPATIENT
Start: 2024-05-09

## 2024-05-09 RX ORDER — LOSARTAN POTASSIUM 100 MG/1
100 TABLET ORAL DAILY
Qty: 90 TABLET | Refills: 1 | Status: SHIPPED | OUTPATIENT
Start: 2024-05-09

## 2024-05-09 RX ORDER — ALBUTEROL SULFATE 2.5 MG/3ML
2.5 SOLUTION RESPIRATORY (INHALATION) EVERY 4 HOURS PRN
Qty: 120 EACH | Refills: 2 | Status: SHIPPED | OUTPATIENT
Start: 2024-05-09

## 2024-05-09 RX ORDER — OMEPRAZOLE 40 MG/1
40 CAPSULE, DELAYED RELEASE ORAL DAILY
Qty: 90 CAPSULE | Refills: 1 | Status: SHIPPED | OUTPATIENT
Start: 2024-05-09

## 2024-05-09 RX ORDER — ATORVASTATIN CALCIUM 10 MG/1
10 TABLET, FILM COATED ORAL NIGHTLY
Qty: 90 TABLET | Refills: 1 | Status: SHIPPED | OUTPATIENT
Start: 2024-05-09

## 2024-05-09 ASSESSMENT — PATIENT HEALTH QUESTIONNAIRE - PHQ9
SUM OF ALL RESPONSES TO PHQ QUESTIONS 1-9: 0
SUM OF ALL RESPONSES TO PHQ9 QUESTIONS 1 & 2: 0
SUM OF ALL RESPONSES TO PHQ QUESTIONS 1-9: 0
SUM OF ALL RESPONSES TO PHQ QUESTIONS 1-9: 0
1. LITTLE INTEREST OR PLEASURE IN DOING THINGS: NOT AT ALL
SUM OF ALL RESPONSES TO PHQ QUESTIONS 1-9: 0
2. FEELING DOWN, DEPRESSED OR HOPELESS: NOT AT ALL

## 2024-05-09 NOTE — PROGRESS NOTES
Chiara Bacon is a 49 y.o. female      Assessment/Plans:    1. Depression, major, recurrent, mild (HCC)  -     DULoxetine (CYMBALTA) 60 MG extended release capsule; Take 1 capsule by mouth daily, Disp-90 capsule, R-1Normal  2. Chronic fatigue  -     Comprehensive Metabolic Panel; Future  -     TSH; Future  -     Vitamin B12 & Folate; Future  3. Chronic tension-type headache, not intractable  -     propranolol (INDERAL LA) 60 MG extended release capsule; Take 1 capsule by mouth daily, Disp-90 capsule, R-1Normal  4. Primary hypertension  -     amLODIPine (NORVASC) 5 MG tablet; Take 1 tablet by mouth daily, Disp-90 tablet, R-1Normal  -     hydroCHLOROthiazide (HYDRODIURIL) 25 MG tablet; Take 1 tablet by mouth daily, Disp-90 tablet, R-1Normal  -     losartan (COZAAR) 100 MG tablet; Take 1 tablet by mouth daily, Disp-90 tablet, R-1Normal  -     propranolol (INDERAL LA) 60 MG extended release capsule; Take 1 capsule by mouth daily, Disp-90 capsule, R-1Normal  5. Pure hypercholesterolemia  -     atorvastatin (LIPITOR) 10 MG tablet; Take 1 tablet by mouth at bedtime, Disp-90 tablet, R-1Normal  6. Asthma without status asthmaticus, moderate persistent, uncomplicated  -     albuterol (PROVENTIL) (2.5 MG/3ML) 0.083% nebulizer solution; Take 3 mLs by nebulization every 4 hours as needed for Shortness of Breath, Disp-120 each, R-2Normal  -     albuterol sulfate HFA (PROVENTIL;VENTOLIN;PROAIR) 108 (90 Base) MCG/ACT inhaler; INHALE 2 PUFFS EVERY 4 HOURS AS NEEDED FOR WHEEZING OR SHORTNESS OF BREATH AND COUGH, Disp-18 g, R-2Normal  -     fluticasone-salmeterol (WIXELA INHUB) 250-50 MCG/ACT AEPB diskus inhaler; Inhale 1 puff into the lungs in the morning and 1 puff in the evening., Disp-60 each, R-3Normal  -     montelukast (SINGULAIR) 10 MG tablet; Take 1 tablet by mouth daily, Disp-90 tablet, R-1Normal  7. Vitamin D deficiency  -     ergocalciferol (ERGOCALCIFEROL) 1.25 MG (10653 UT) capsule; Take 1 capsule by mouth every 7

## 2024-05-09 NOTE — PROGRESS NOTES
Chief Complaint   Patient presents with    3 Month Follow-Up       1. \"Have you been to the ER, urgent care clinic since your last visit?  Hospitalized since your last visit?\" No    2. \"Have you seen or consulted any other health care providers outside of the [facility] since your last visit?\" No     3. For patients aged 45-75: Has the patient had a colonoscopy / FIT/ Cologuard? Yes      If the patient is female:    4. For patients aged 40-74: Has the patient had a mammogram within the past 2 years? Yes      5. For patients aged 21-65: Has the patient had a pap smear? Yes       The patient, Chiara Joshi, identity was verified by name and .      Health Maintenance Due   Topic Date Due    Hepatitis B vaccine (1 of 3 - 3-dose series) Never done    Shingles vaccine (1 of 2) Never done    Pneumococcal 0-64 years Vaccine (2 of 2 - PCV) 2021    COVID-19 Vaccine (2023- season) 2024

## 2024-09-26 ENCOUNTER — APPOINTMENT (OUTPATIENT)
Facility: HOSPITAL | Age: 49
End: 2024-09-26
Payer: COMMERCIAL

## 2024-10-04 ENCOUNTER — HOSPITAL ENCOUNTER (OUTPATIENT)
Facility: HOSPITAL | Age: 49
Setting detail: INFUSION SERIES
Discharge: HOME | End: 2024-10-04
Payer: COMMERCIAL

## 2024-10-04 VITALS
HEART RATE: 81 BPM | WEIGHT: 293 LBS | OXYGEN SATURATION: 94 % | RESPIRATION RATE: 16 BRPM | HEIGHT: 66 IN | SYSTOLIC BLOOD PRESSURE: 104 MMHG | BODY MASS INDEX: 47.09 KG/M2 | TEMPERATURE: 97.6 F | DIASTOLIC BLOOD PRESSURE: 71 MMHG

## 2024-10-04 DIAGNOSIS — G35 ACUTE RELAPSING MULTIPLE SCLEROSIS (HCC): Primary | ICD-10-CM

## 2024-10-04 PROCEDURE — 2580000003 HC RX 258: Performed by: PSYCHIATRY & NEUROLOGY

## 2024-10-04 PROCEDURE — 6360000002 HC RX W HCPCS: Performed by: PSYCHIATRY & NEUROLOGY

## 2024-10-04 PROCEDURE — 96415 CHEMO IV INFUSION ADDL HR: CPT

## 2024-10-04 PROCEDURE — 2500000003 HC RX 250 WO HCPCS: Performed by: PSYCHIATRY & NEUROLOGY

## 2024-10-04 PROCEDURE — 96375 TX/PRO/DX INJ NEW DRUG ADDON: CPT

## 2024-10-04 PROCEDURE — 6370000000 HC RX 637 (ALT 250 FOR IP): Performed by: PSYCHIATRY & NEUROLOGY

## 2024-10-04 PROCEDURE — 96413 CHEMO IV INFUSION 1 HR: CPT

## 2024-10-04 RX ORDER — SODIUM CHLORIDE 0.9 % (FLUSH) 0.9 %
5-40 SYRINGE (ML) INJECTION PRN
OUTPATIENT
Start: 2024-10-04

## 2024-10-04 RX ORDER — ONDANSETRON 2 MG/ML
8 INJECTION INTRAMUSCULAR; INTRAVENOUS
OUTPATIENT
Start: 2024-10-04

## 2024-10-04 RX ORDER — ACETAMINOPHEN 325 MG/1
650 TABLET ORAL
OUTPATIENT
Start: 2024-10-04

## 2024-10-04 RX ORDER — SODIUM CHLORIDE 9 MG/ML
5-250 INJECTION, SOLUTION INTRAVENOUS PRN
Status: DISCONTINUED | OUTPATIENT
Start: 2024-10-04 | End: 2024-10-05 | Stop reason: HOSPADM

## 2024-10-04 RX ORDER — EPINEPHRINE 1 MG/ML
0.3 INJECTION, SOLUTION INTRAMUSCULAR; SUBCUTANEOUS PRN
OUTPATIENT
Start: 2024-10-04

## 2024-10-04 RX ORDER — DIPHENHYDRAMINE HCL 25 MG
50 CAPSULE ORAL ONCE
Status: CANCELLED | OUTPATIENT
Start: 2024-10-04 | End: 2024-10-04

## 2024-10-04 RX ORDER — DIPHENHYDRAMINE HYDROCHLORIDE 50 MG/ML
50 INJECTION INTRAMUSCULAR; INTRAVENOUS
OUTPATIENT
Start: 2024-10-04

## 2024-10-04 RX ORDER — ACETAMINOPHEN 325 MG/1
650 TABLET ORAL ONCE
Status: COMPLETED | OUTPATIENT
Start: 2024-10-04 | End: 2024-10-04

## 2024-10-04 RX ORDER — ACETAMINOPHEN 325 MG/1
650 TABLET ORAL ONCE
Status: CANCELLED | OUTPATIENT
Start: 2024-10-04 | End: 2024-10-04

## 2024-10-04 RX ORDER — ALBUTEROL SULFATE 90 UG/1
4 INHALANT RESPIRATORY (INHALATION) PRN
OUTPATIENT
Start: 2024-10-04

## 2024-10-04 RX ORDER — SODIUM CHLORIDE 9 MG/ML
5-250 INJECTION, SOLUTION INTRAVENOUS PRN
OUTPATIENT
Start: 2024-10-04

## 2024-10-04 RX ORDER — SODIUM CHLORIDE 9 MG/ML
INJECTION, SOLUTION INTRAVENOUS CONTINUOUS
OUTPATIENT
Start: 2024-10-04

## 2024-10-04 RX ORDER — DIPHENHYDRAMINE HCL 25 MG
50 CAPSULE ORAL ONCE
Status: COMPLETED | OUTPATIENT
Start: 2024-10-04 | End: 2024-10-04

## 2024-10-04 RX ORDER — SODIUM CHLORIDE 9 MG/ML
5-250 INJECTION, SOLUTION INTRAVENOUS PRN
Status: CANCELLED | OUTPATIENT
Start: 2024-10-04

## 2024-10-04 RX ADMIN — METHYLPREDNISOLONE SODIUM SUCCINATE 125 MG: 125 INJECTION, POWDER, FOR SOLUTION INTRAMUSCULAR; INTRAVENOUS at 10:32

## 2024-10-04 RX ADMIN — SODIUM CHLORIDE 20 ML/HR: 9 INJECTION, SOLUTION INTRAVENOUS at 10:32

## 2024-10-04 RX ADMIN — OCRELIZUMAB 600 MG: 300 INJECTION INTRAVENOUS at 11:01

## 2024-10-04 RX ADMIN — ACETAMINOPHEN 650 MG: 325 TABLET ORAL at 10:29

## 2024-10-04 RX ADMIN — DIPHENHYDRAMINE HYDROCHLORIDE 50 MG: 25 CAPSULE ORAL at 10:29

## 2024-10-04 RX ADMIN — FAMOTIDINE 20 MG: 10 INJECTION, SOLUTION INTRAVENOUS at 11:46

## 2024-10-04 NOTE — PROGRESS NOTES
Name: Chiara Bacon    MRN: [id number]         : 1975    Ms. Bacon Arrived ambulatory and in no distress for Ocrevus (d3oexch maintenance).  Assessment was completed. Pt reports having Covid 2 weeks ago. Reports no further symptoms. Reports dyspnea on exertion (as prior to Covid diagnosis).  22 gauge IV started in left AC without difficulty, brisk blood return.    Ms. Bacon's vitals were reviewed.  Patient Vitals for the past 24 hrs:   BP Temp Temp src Pulse Resp SpO2 Height Weight   10/04/24 1226 110/72 -- -- 69 16 -- -- --   10/04/24 1152 110/75 -- -- 68 -- -- -- --   10/04/24 1148 -- -- -- 69 16 94 % -- --   10/04/24 1135 107/71 -- -- 67 16 -- -- --   10/04/24 1116 101/69 -- -- 79 16 -- -- --   10/04/24 1008 125/81 97.6 °F (36.4 °C) Temporal 79 18 94 % 1.676 m (5' 5.98\") (!) 151.4 kg (333 lb 12.8 oz)       Medications:  Medications Administered         0.9 % sodium chloride infusion Admin Date  10/04/2024 Action  New Bag Dose  20 mL/hr Rate  20 mL/hr Route  IntraVENous Documented By  Dina Ulloa RN        acetaminophen (TYLENOL) tablet 650 mg Admin Date  10/04/2024 Action  Given Dose  650 mg Rate   Route  Oral Documented By  Dina Ulloa RN        diphenhydrAMINE (BENADRYL) capsule 50 mg Admin Date  10/04/2024 Action  Given Dose  50 mg Rate   Route  Oral Documented By  Dina Ulloa RN        famotidine (PEPCID) 20 mg in sodium chloride (PF) 0.9 % 10 mL injection Admin Date  10/04/2024 Action  Given Dose  20 mg Rate   Route  IntraVENous Documented By  Dina Ulola RN        methylPREDNISolone sodium (PF) (SOLU-MEDROL PF) injection 125 mg Admin Date  10/04/2024 Action  Given Dose  125 mg Rate   Route  IntraVENous Documented By  Dina Ulloa RN        ocrelizumab (OCREVUS) 600 mg in sodium chloride 0.9 % 500 mL IVPB Admin Date  10/04/2024 Action  New Bag Dose  600 mg Rate  100 mL/hr Route  IntraVENous Documented By  Dina Ulloa, RN

## 2024-11-05 ENCOUNTER — OFFICE VISIT (OUTPATIENT)
Facility: CLINIC | Age: 49
End: 2024-11-05

## 2024-11-05 VITALS
RESPIRATION RATE: 18 BRPM | TEMPERATURE: 98.4 F | OXYGEN SATURATION: 94 % | SYSTOLIC BLOOD PRESSURE: 145 MMHG | HEART RATE: 73 BPM | HEIGHT: 66 IN | DIASTOLIC BLOOD PRESSURE: 97 MMHG | BODY MASS INDEX: 47.09 KG/M2 | WEIGHT: 293 LBS

## 2024-11-05 DIAGNOSIS — G47.33 OSA ON CPAP: ICD-10-CM

## 2024-11-05 DIAGNOSIS — Z23 NEEDS FLU SHOT: ICD-10-CM

## 2024-11-05 DIAGNOSIS — D50.0 IRON DEFICIENCY ANEMIA DUE TO CHRONIC BLOOD LOSS: ICD-10-CM

## 2024-11-05 DIAGNOSIS — E55.9 VITAMIN D DEFICIENCY: ICD-10-CM

## 2024-11-05 DIAGNOSIS — I10 PRIMARY HYPERTENSION: Primary | ICD-10-CM

## 2024-11-05 DIAGNOSIS — E78.00 PURE HYPERCHOLESTEROLEMIA: ICD-10-CM

## 2024-11-05 DIAGNOSIS — E66.01 MORBID (SEVERE) OBESITY DUE TO EXCESS CALORIES: ICD-10-CM

## 2024-11-05 RX ORDER — PREGABALIN 225 MG/1
225 CAPSULE ORAL 2 TIMES DAILY
COMMUNITY
Start: 2024-10-08

## 2024-11-05 RX ORDER — TIRZEPATIDE 5 MG/.5ML
INJECTION, SOLUTION SUBCUTANEOUS
COMMUNITY
Start: 2024-10-09

## 2024-11-05 SDOH — ECONOMIC STABILITY: FOOD INSECURITY: WITHIN THE PAST 12 MONTHS, THE FOOD YOU BOUGHT JUST DIDN'T LAST AND YOU DIDN'T HAVE MONEY TO GET MORE.: NEVER TRUE

## 2024-11-05 SDOH — ECONOMIC STABILITY: FOOD INSECURITY: WITHIN THE PAST 12 MONTHS, YOU WORRIED THAT YOUR FOOD WOULD RUN OUT BEFORE YOU GOT MONEY TO BUY MORE.: NEVER TRUE

## 2024-11-05 SDOH — ECONOMIC STABILITY: INCOME INSECURITY: HOW HARD IS IT FOR YOU TO PAY FOR THE VERY BASICS LIKE FOOD, HOUSING, MEDICAL CARE, AND HEATING?: NOT HARD AT ALL

## 2024-11-05 NOTE — PROGRESS NOTES
Chiara Bacon is a 49 y.o. female     Chief Complaint   Patient presents with    New to provider       BP (!) 145/97 (Site: Left Upper Arm, Position: Sitting, Cuff Size: Medium Adult)   Pulse 73   Temp 98.4 °F (36.9 °C) (Oral)   Resp 18   Ht 1.676 m (5' 5.98\")   Wt (!) 152.3 kg (335 lb 12.8 oz)   SpO2 94%   BMI 54.23 kg/m²     Health Maintenance Due   Topic Date Due    Pneumococcal 0-64 years Vaccine (2 of 2 - PCV) 01/13/2021    Flu vaccine (1) 08/01/2024    COVID-19 Vaccine (7 - 2023-24 season) 09/01/2024         \"Have you been to the ER, urgent care clinic since your last visit?  Hospitalized since your last visit?\"    NO    “Have you seen or consulted any other health care providers outside of [facility] since your last visit?”    NO    Administered influenza vaccine in right deltoid patient tolerated injection well.    Lot#:[id number]    Exp:6/17/2025    NDC:17712-439-08                  
Low Risk  (11/5/2024)    Overall Financial Resource Strain (CARDIA)     Difficulty of Paying Living Expenses: Not hard at all   Food Insecurity: No Food Insecurity (11/5/2024)    Hunger Vital Sign     Worried About Running Out of Food in the Last Year: Never true     Ran Out of Food in the Last Year: Never true   Transportation Needs: Unknown (11/5/2024)    PRAPARE - Transportation     Lack of Transportation (Non-Medical): No    [facility]    Intimate Partner Violence   Housing Stability: Unknown (11/5/2024)    Housing Stability Vital Sign     Homeless in the Last Year: No     Family History   Problem Relation Age of Onset    Alcohol Abuse Father         Has been clean for over 40 years now    Diabetes Father     Cancer Maternal Uncle         Brain cancer    Breast Cancer Half-Sister     Diabetes Mother     Hypertension Mother     Osteoarthritis Mother     Osteoarthritis Maternal Grandmother      Current Outpatient Medications   Medication Sig Dispense Refill    pregabalin (LYRICA) 225 MG capsule Take 1 capsule by mouth 2 times daily.      ZEPBOUND 5 MG/0.5ML SOAJ subCUTAneous auto-injector pen INJECT 5 MG SUBCUTANEOUSLY WEEKLY      tiZANidine (ZANAFLEX) 4 MG tablet TAKE 1 TAB BY MOUTH 30 MIN PRIOR TO PROCEDURE REPEAT WITH 1 TAB AS NEEDED IF NOT OVERLY SEDATED - HAS MULTIPLE PROCEDURES      albuterol (PROVENTIL) (2.5 MG/3ML) 0.083% nebulizer solution Take 3 mLs by nebulization every 4 hours as needed for Shortness of Breath 120 each 2    albuterol sulfate HFA (PROVENTIL;VENTOLIN;PROAIR) 108 (90 Base) MCG/ACT inhaler INHALE 2 PUFFS EVERY 4 HOURS AS NEEDED FOR WHEEZING OR SHORTNESS OF BREATH AND COUGH 18 g 2    amLODIPine (NORVASC) 5 MG tablet Take 1 tablet by mouth daily 90 tablet 1    atorvastatin (LIPITOR) 10 MG tablet Take 1 tablet by mouth at bedtime 90 tablet 1    DULoxetine (CYMBALTA) 60 MG extended release capsule Take 1 capsule by mouth daily 90 capsule 1    ergocalciferol

## 2024-11-05 NOTE — ASSESSMENT & PLAN NOTE
- BP in office today 145/97, repeat 131/104  - Not at goal BP < 140/90, continue current BP medication regimen, RFT check  - Encourage DASH diet and regular exercise, periodically monitor BP at home, call if elevated or symptomatic   - Patient provided with blood pressure log to monitor and record readings at home. Patient directed to bring log into next office visit to review.

## 2024-11-06 LAB
25(OH)D3 SERPL-MCNC: 57.1 NG/ML (ref 30–100)
ANION GAP SERPL CALC-SCNC: 7 MMOL/L (ref 2–12)
BASOPHILS # BLD: 0.1 K/UL (ref 0–0.1)
BASOPHILS NFR BLD: 1 % (ref 0–1)
BUN SERPL-MCNC: 10 MG/DL (ref 6–20)
BUN/CREAT SERPL: 9 (ref 12–20)
CALCIUM SERPL-MCNC: 10.5 MG/DL (ref 8.5–10.1)
CHLORIDE SERPL-SCNC: 102 MMOL/L (ref 97–108)
CHOLEST SERPL-MCNC: 258 MG/DL
CO2 SERPL-SCNC: 31 MMOL/L (ref 21–32)
COMMENT:: NORMAL
CREAT SERPL-MCNC: 1.07 MG/DL (ref 0.55–1.02)
CREAT UR-MCNC: 227 MG/DL
DIFFERENTIAL METHOD BLD: ABNORMAL
EOSINOPHIL # BLD: 0.3 K/UL (ref 0–0.4)
EOSINOPHIL NFR BLD: 4 % (ref 0–7)
ERYTHROCYTE [DISTWIDTH] IN BLOOD BY AUTOMATED COUNT: 13 % (ref 11.5–14.5)
GLUCOSE SERPL-MCNC: 94 MG/DL (ref 65–100)
HCT VFR BLD AUTO: 43.4 % (ref 35–47)
HDLC SERPL-MCNC: 66 MG/DL
HDLC SERPL: 3.9 (ref 0–5)
HGB BLD-MCNC: 14.3 G/DL (ref 11.5–16)
IMM GRANULOCYTES # BLD AUTO: 0 K/UL (ref 0–0.04)
IMM GRANULOCYTES NFR BLD AUTO: 0 % (ref 0–0.5)
IRON SATN MFR SERPL: 31 % (ref 20–50)
IRON SERPL-MCNC: 107 UG/DL (ref 35–150)
LDLC SERPL CALC-MCNC: 160.4 MG/DL (ref 0–100)
LYMPHOCYTES # BLD: 2.8 K/UL (ref 0.8–3.5)
LYMPHOCYTES NFR BLD: 34 % (ref 12–49)
MCH RBC QN AUTO: 31.7 PG (ref 26–34)
MCHC RBC AUTO-ENTMCNC: 32.9 G/DL (ref 30–36.5)
MCV RBC AUTO: 96.2 FL (ref 80–99)
MICROALBUMIN UR-MCNC: 4.18 MG/DL
MICROALBUMIN/CREAT UR-RTO: 18 MG/G (ref 0–30)
MONOCYTES # BLD: 0.8 K/UL (ref 0–1)
MONOCYTES NFR BLD: 10 % (ref 5–13)
NEUTS SEG # BLD: 4.1 K/UL (ref 1.8–8)
NEUTS SEG NFR BLD: 51 % (ref 32–75)
NRBC # BLD: 0.02 K/UL (ref 0–0.01)
NRBC BLD-RTO: 0.2 PER 100 WBC
PLATELET # BLD AUTO: 502 K/UL (ref 150–400)
PMV BLD AUTO: 10.1 FL (ref 8.9–12.9)
POTASSIUM SERPL-SCNC: 3.9 MMOL/L (ref 3.5–5.1)
RBC # BLD AUTO: 4.51 M/UL (ref 3.8–5.2)
SODIUM SERPL-SCNC: 140 MMOL/L (ref 136–145)
SPECIMEN HOLD: NORMAL
TIBC SERPL-MCNC: 347 UG/DL (ref 250–450)
TRIGL SERPL-MCNC: 158 MG/DL
VLDLC SERPL CALC-MCNC: 31.6 MG/DL
WBC # BLD AUTO: 8.1 K/UL (ref 3.6–11)

## 2024-11-06 NOTE — RESULT ENCOUNTER NOTE
Contacted patient, reviewed labs, all questions fully answered.  Lipid panel elevated. Taking Lipitor 10. Lifestyle modification encouraged.   Elevated Cr, per patient she was dehydrated, will recheck during next office visit  No change in current management/meds.

## 2024-11-07 NOTE — ASSESSMENT & PLAN NOTE
BMI in office today 54.23. Provided counseling on lifestyle intervention - establishing healthier eating habits and regular exercise routine; Goal: 5% weight loss, F/U 6 months. Educational Material provided. Will revisit topic during next office visit. Continue Zepbound.

## 2024-11-07 NOTE — ASSESSMENT & PLAN NOTE
Continue current medication regimen, Lipitor. No new myalgias or GI upset on current medications. Medication compliance: Yes. Patient is following a low fat, low cholesterol diet. Encouraged regular exercise.

## 2024-11-18 DIAGNOSIS — J45.40 ASTHMA WITHOUT STATUS ASTHMATICUS, MODERATE PERSISTENT, UNCOMPLICATED: ICD-10-CM

## 2024-11-18 DIAGNOSIS — F33.0 DEPRESSION, MAJOR, RECURRENT, MILD (HCC): ICD-10-CM

## 2024-11-18 DIAGNOSIS — E78.00 PURE HYPERCHOLESTEROLEMIA: ICD-10-CM

## 2024-11-18 DIAGNOSIS — I10 PRIMARY HYPERTENSION: ICD-10-CM

## 2024-11-18 DIAGNOSIS — K21.9 GASTROESOPHAGEAL REFLUX DISEASE, UNSPECIFIED WHETHER ESOPHAGITIS PRESENT: ICD-10-CM

## 2024-11-18 DIAGNOSIS — E55.9 VITAMIN D DEFICIENCY: ICD-10-CM

## 2024-11-18 DIAGNOSIS — G44.229 CHRONIC TENSION-TYPE HEADACHE, NOT INTRACTABLE: ICD-10-CM

## 2024-11-19 NOTE — TELEPHONE ENCOUNTER
Routing to new PCP    Last appointment: 11/5/24  Next appointment: 2/5/25  Previous refill encounter(s): 5/9/24 90 d/s with 1 refill    Requested Prescriptions     Pending Prescriptions Disp Refills    amLODIPine (NORVASC) 5 MG tablet 90 tablet 1     Sig: Take 1 tablet by mouth daily    atorvastatin (LIPITOR) 10 MG tablet 90 tablet 1     Sig: Take 1 tablet by mouth at bedtime    DULoxetine (CYMBALTA) 60 MG extended release capsule 90 capsule 1     Sig: Take 1 capsule by mouth daily    ergocalciferol (ERGOCALCIFEROL) 1.25 MG (97250 UT) capsule 12 capsule 1     Sig: Take 1 capsule by mouth every 7 days    hydroCHLOROthiazide (HYDRODIURIL) 25 MG tablet 90 tablet 1     Sig: Take 1 tablet by mouth daily    losartan (COZAAR) 100 MG tablet 90 tablet 1     Sig: Take 1 tablet by mouth daily    montelukast (SINGULAIR) 10 MG tablet 90 tablet 1     Sig: Take 1 tablet by mouth daily    omeprazole (PRILOSEC) 40 MG delayed release capsule 90 capsule 1     Sig: Take 1 capsule by mouth daily    propranolol (INDERAL LA) 60 MG extended release capsule 90 capsule 1     Sig: Take 1 capsule by mouth daily         For Pharmacy Admin Tracking Only    Program: Medication Refill  CPA in place:    Recommendation Provided To:   Intervention Detail: New Rx: 9, reason: Patient Preference  Intervention Accepted By:   Gap Closed?:    Time Spent (min): 5

## 2024-11-20 RX ORDER — HYDROCHLOROTHIAZIDE 25 MG/1
25 TABLET ORAL DAILY
Qty: 90 TABLET | Refills: 1 | Status: SHIPPED | OUTPATIENT
Start: 2024-11-20

## 2024-11-20 RX ORDER — OMEPRAZOLE 40 MG/1
40 CAPSULE, DELAYED RELEASE ORAL DAILY
Qty: 90 CAPSULE | Refills: 1 | Status: SHIPPED | OUTPATIENT
Start: 2024-11-20

## 2024-11-20 RX ORDER — DULOXETIN HYDROCHLORIDE 60 MG/1
60 CAPSULE, DELAYED RELEASE ORAL DAILY
Qty: 90 CAPSULE | Refills: 1 | Status: SHIPPED | OUTPATIENT
Start: 2024-11-20

## 2024-11-20 RX ORDER — AMLODIPINE BESYLATE 5 MG/1
5 TABLET ORAL DAILY
Qty: 90 TABLET | Refills: 1 | Status: SHIPPED | OUTPATIENT
Start: 2024-11-20

## 2024-11-20 RX ORDER — LOSARTAN POTASSIUM 100 MG/1
100 TABLET ORAL DAILY
Qty: 90 TABLET | Refills: 1 | Status: SHIPPED | OUTPATIENT
Start: 2024-11-20

## 2024-11-20 RX ORDER — MONTELUKAST SODIUM 10 MG/1
10 TABLET ORAL DAILY
Qty: 90 TABLET | Refills: 1 | Status: SHIPPED | OUTPATIENT
Start: 2024-11-20

## 2024-11-20 RX ORDER — ERGOCALCIFEROL 1.25 MG/1
50000 CAPSULE ORAL
Qty: 12 CAPSULE | Refills: 1 | Status: SHIPPED | OUTPATIENT
Start: 2024-11-20

## 2024-11-20 RX ORDER — PROPRANOLOL HYDROCHLORIDE 60 MG/1
60 CAPSULE, EXTENDED RELEASE ORAL DAILY
Qty: 90 CAPSULE | Refills: 1 | Status: SHIPPED | OUTPATIENT
Start: 2024-11-20

## 2024-11-20 RX ORDER — ATORVASTATIN CALCIUM 10 MG/1
10 TABLET, FILM COATED ORAL NIGHTLY
Qty: 90 TABLET | Refills: 1 | Status: SHIPPED | OUTPATIENT
Start: 2024-11-20

## 2025-01-07 ENCOUNTER — HOSPITAL ENCOUNTER (EMERGENCY)
Facility: HOSPITAL | Age: 50
Discharge: HOME | End: 2025-01-07
Attending: STUDENT IN AN ORGANIZED HEALTH CARE EDUCATION/TRAINING PROGRAM

## 2025-01-07 ENCOUNTER — APPOINTMENT (OUTPATIENT)
Facility: HOSPITAL | Age: 50
End: 2025-01-07

## 2025-01-07 VITALS
HEIGHT: 66 IN | SYSTOLIC BLOOD PRESSURE: 133 MMHG | RESPIRATION RATE: 20 BRPM | DIASTOLIC BLOOD PRESSURE: 84 MMHG | TEMPERATURE: 98 F | BODY MASS INDEX: 47.09 KG/M2 | OXYGEN SATURATION: 98 % | HEART RATE: 67 BPM | WEIGHT: 293 LBS

## 2025-01-07 DIAGNOSIS — R07.89 ATYPICAL CHEST PAIN: Primary | ICD-10-CM

## 2025-01-07 LAB
ALBUMIN SERPL-MCNC: 3.5 G/DL (ref 3.5–5)
ALBUMIN/GLOB SERPL: 1.1 (ref 1.1–2.2)
ALP SERPL-CCNC: 69 U/L (ref 45–117)
ALT SERPL-CCNC: 63 U/L (ref 12–78)
ANION GAP SERPL CALC-SCNC: 4 MMOL/L (ref 2–12)
AST SERPL-CCNC: 39 U/L (ref 15–37)
BASOPHILS # BLD: 0.07 K/UL (ref 0–0.1)
BASOPHILS NFR BLD: 1 % (ref 0–1)
BILIRUB SERPL-MCNC: 0.4 MG/DL (ref 0.2–1)
BUN SERPL-MCNC: 11 MG/DL (ref 6–20)
BUN/CREAT SERPL: 11 (ref 12–20)
CALCIUM SERPL-MCNC: 9.1 MG/DL (ref 8.5–10.1)
CHLORIDE SERPL-SCNC: 105 MMOL/L (ref 97–108)
CO2 SERPL-SCNC: 33 MMOL/L (ref 21–32)
CREAT SERPL-MCNC: 0.99 MG/DL (ref 0.55–1.02)
DIFFERENTIAL METHOD BLD: ABNORMAL
EKG ATRIAL RATE: 72 BPM
EKG DIAGNOSIS: NORMAL
EKG P AXIS: 41 DEGREES
EKG P-R INTERVAL: 176 MS
EKG Q-T INTERVAL: 436 MS
EKG QRS DURATION: 80 MS
EKG QTC CALCULATION (BAZETT): 477 MS
EKG R AXIS: 13 DEGREES
EKG T AXIS: -8 DEGREES
EKG VENTRICULAR RATE: 72 BPM
EOSINOPHIL # BLD: 0.27 K/UL (ref 0–0.4)
EOSINOPHIL NFR BLD: 3.7 % (ref 0–7)
ERYTHROCYTE [DISTWIDTH] IN BLOOD BY AUTOMATED COUNT: 13.1 % (ref 11.5–14.5)
FLUAV RNA SPEC QL NAA+PROBE: NOT DETECTED
FLUBV RNA SPEC QL NAA+PROBE: NOT DETECTED
GLOBULIN SER CALC-MCNC: 3.3 G/DL (ref 2–4)
GLUCOSE SERPL-MCNC: 132 MG/DL (ref 65–100)
HCT VFR BLD AUTO: 40.8 % (ref 35–47)
HGB BLD-MCNC: 13.7 G/DL (ref 11.5–16)
IMM GRANULOCYTES # BLD AUTO: 0.05 K/UL (ref 0–0.04)
IMM GRANULOCYTES NFR BLD AUTO: 0.7 % (ref 0–0.5)
LYMPHOCYTES # BLD: 2.04 K/UL (ref 0.8–3.5)
LYMPHOCYTES NFR BLD: 28 % (ref 12–49)
MCH RBC QN AUTO: 31.9 PG (ref 26–34)
MCHC RBC AUTO-ENTMCNC: 33.6 G/DL (ref 30–36.5)
MCV RBC AUTO: 94.9 FL (ref 80–99)
MONOCYTES # BLD: 0.79 K/UL (ref 0–1)
MONOCYTES NFR BLD: 10.9 % (ref 5–13)
NEUTS SEG # BLD: 4.06 K/UL (ref 1.8–8)
NEUTS SEG NFR BLD: 55.7 % (ref 32–75)
NRBC # BLD: 0 K/UL (ref 0–0.01)
NRBC BLD-RTO: 0 PER 100 WBC
PLATELET # BLD AUTO: 418 K/UL (ref 150–400)
PMV BLD AUTO: 9.8 FL (ref 8.9–12.9)
POTASSIUM SERPL-SCNC: 3.1 MMOL/L (ref 3.5–5.1)
PROT SERPL-MCNC: 6.8 G/DL (ref 6.4–8.2)
RBC # BLD AUTO: 4.3 M/UL (ref 3.8–5.2)
SARS-COV-2 RNA RESP QL NAA+PROBE: NOT DETECTED
SODIUM SERPL-SCNC: 142 MMOL/L (ref 136–145)
SOURCE: NORMAL
TROPONIN I SERPL HS-MCNC: 5 NG/L (ref 0–51)
TROPONIN I SERPL HS-MCNC: 7 NG/L (ref 0–51)
TSH SERPL DL<=0.05 MIU/L-ACNC: 1.69 UIU/ML (ref 0.36–3.74)
WBC # BLD AUTO: 7.3 K/UL (ref 3.6–11)

## 2025-01-07 PROCEDURE — 84443 ASSAY THYROID STIM HORMONE: CPT

## 2025-01-07 PROCEDURE — 80053 COMPREHEN METABOLIC PANEL: CPT

## 2025-01-07 PROCEDURE — 6370000000 HC RX 637 (ALT 250 FOR IP)

## 2025-01-07 PROCEDURE — 71046 X-RAY EXAM CHEST 2 VIEWS: CPT

## 2025-01-07 PROCEDURE — 93005 ELECTROCARDIOGRAM TRACING: CPT | Performed by: STUDENT IN AN ORGANIZED HEALTH CARE EDUCATION/TRAINING PROGRAM

## 2025-01-07 PROCEDURE — 99285 EMERGENCY DEPT VISIT HI MDM: CPT

## 2025-01-07 PROCEDURE — 87636 SARSCOV2 & INF A&B AMP PRB: CPT

## 2025-01-07 PROCEDURE — 36415 COLL VENOUS BLD VENIPUNCTURE: CPT

## 2025-01-07 PROCEDURE — 96374 THER/PROPH/DIAG INJ IV PUSH: CPT

## 2025-01-07 PROCEDURE — 6360000002 HC RX W HCPCS: Performed by: STUDENT IN AN ORGANIZED HEALTH CARE EDUCATION/TRAINING PROGRAM

## 2025-01-07 PROCEDURE — 85025 COMPLETE CBC W/AUTO DIFF WBC: CPT

## 2025-01-07 PROCEDURE — 84484 ASSAY OF TROPONIN QUANT: CPT

## 2025-01-07 RX ORDER — ONDANSETRON 2 MG/ML
4 INJECTION INTRAMUSCULAR; INTRAVENOUS ONCE
Status: COMPLETED | OUTPATIENT
Start: 2025-01-07 | End: 2025-01-07

## 2025-01-07 RX ORDER — POTASSIUM CHLORIDE 1500 MG/1
40 TABLET, EXTENDED RELEASE ORAL ONCE
Status: COMPLETED | OUTPATIENT
Start: 2025-01-07 | End: 2025-01-07

## 2025-01-07 RX ORDER — ASPIRIN 81 MG/1
324 TABLET, CHEWABLE ORAL ONCE
Status: COMPLETED | OUTPATIENT
Start: 2025-01-07 | End: 2025-01-07

## 2025-01-07 RX ADMIN — POTASSIUM CHLORIDE 40 MEQ: 1500 TABLET, EXTENDED RELEASE ORAL at 09:20

## 2025-01-07 RX ADMIN — ONDANSETRON 4 MG: 2 INJECTION INTRAMUSCULAR; INTRAVENOUS at 10:03

## 2025-01-07 RX ADMIN — ASPIRIN 324 MG: 81 TABLET, CHEWABLE ORAL at 08:34

## 2025-01-07 ASSESSMENT — PAIN DESCRIPTION - LOCATION: LOCATION: CHEST

## 2025-01-07 ASSESSMENT — PAIN DESCRIPTION - DESCRIPTORS: DESCRIPTORS: HEAVINESS

## 2025-01-07 ASSESSMENT — PAIN SCALES - GENERAL: PAINLEVEL_OUTOF10: 8

## 2025-01-07 NOTE — ED PROVIDER NOTES
appearance   HENT:      Head: Normocephalic and atraumatic.      Nose: Congestion present.      Mouth/Throat:      Mouth: Mucous membranes are moist.   Eyes:      Extraocular Movements: Extraocular movements intact.      Pupils: Pupils are equal, round, and reactive to light.   Neck:      Vascular: No JVD.   Cardiovascular:      Rate and Rhythm: Normal rate and regular rhythm.      Heart sounds: Normal heart sounds.   Pulmonary:      Effort: Pulmonary effort is normal. No tachypnea, accessory muscle usage or respiratory distress.      Breath sounds: Normal breath sounds. No decreased breath sounds, wheezing, rhonchi or rales.   Chest:      Chest wall: No tenderness.   Abdominal:      General: There is no distension.      Palpations: Abdomen is soft.      Tenderness: There is no abdominal tenderness. There is no guarding.   Musculoskeletal:      Right lower leg: No edema.      Left lower leg: No edema.   Skin:     General: Skin is warm and dry.   Neurological:      Mental Status: She is alert and oriented to person, place, and time. Mental status is at baseline.   Psychiatric:         Mood and Affect: Mood is anxious.           SCREENINGS                No data recorded    LAB, EKG AND DIAGNOSTIC RESULTS   Labs:  Recent Results (from the past 12 hour(s))   EKG 12 Lead    Collection Time: 01/07/25  6:50 AM   Result Value Ref Range    Ventricular Rate 72 BPM    Atrial Rate 72 BPM    P-R Interval 176 ms    QRS Duration 80 ms    Q-T Interval 436 ms    QTc Calculation (Bazett) 477 ms    P Axis 41 degrees    R Axis 13 degrees    T Axis -8 degrees    Diagnosis       Normal sinus rhythm  Nonspecific T wave abnormality  Abnormal ECG  When compared with ECG of 07-FEB-2023 08:30,  MANUAL COMPARISON REQUIRED, DATA IS UNCONFIRMED     CBC with Auto Differential    Collection Time: 01/07/25  7:34 AM   Result Value Ref Range    WBC 7.3 3.6 - 11.0 K/uL    RBC 4.30 3.80 - 5.20 M/uL    Hemoglobin 13.7 11.5 - 16.0 g/dL    Hematocrit

## 2025-01-07 NOTE — DISCHARGE INSTRUCTIONS
You were seen in the emergency department for evaluation of chest pain and trouble breathing. We did a chest xray and labs work that was all normal. We gave you nausea medicine and aspirin which helped with your symptoms and you felt better. We recommend following up with a cardiologist as soon as possible. Call to schedule an appointment. Return to the emergency department is you have worsening chest pain, trouble breathing, vomiting, passing out, or any other symptoms that you find concerning. Do not hesitate to return if you feel unwell or worse.

## 2025-01-16 DIAGNOSIS — K21.9 GASTROESOPHAGEAL REFLUX DISEASE, UNSPECIFIED WHETHER ESOPHAGITIS PRESENT: ICD-10-CM

## 2025-01-16 DIAGNOSIS — F33.0 DEPRESSION, MAJOR, RECURRENT, MILD (HCC): ICD-10-CM

## 2025-01-16 DIAGNOSIS — J45.40 ASTHMA WITHOUT STATUS ASTHMATICUS, MODERATE PERSISTENT, UNCOMPLICATED: ICD-10-CM

## 2025-01-16 RX ORDER — ALBUTEROL SULFATE 90 UG/1
INHALANT RESPIRATORY (INHALATION)
Qty: 18 G | Refills: 2 | OUTPATIENT
Start: 2025-01-16

## 2025-01-16 RX ORDER — FLUTICASONE PROPIONATE AND SALMETEROL 250; 50 UG/1; UG/1
1 POWDER RESPIRATORY (INHALATION) EVERY 12 HOURS
Qty: 60 EACH | Refills: 3 | OUTPATIENT
Start: 2025-01-16

## 2025-01-16 NOTE — TELEPHONE ENCOUNTER
Patient comment: Changing to [facility] on [address]     Last appointment: 11/5/24  Next appointment: 2/5/25    Requested Prescriptions     Pending Prescriptions Disp Refills    DULoxetine (CYMBALTA) 60 MG extended release capsule 90 capsule 1     Sig: Take 1 capsule by mouth daily    omeprazole (PRILOSEC) 40 MG delayed release capsule 90 capsule 1     Sig: Take 1 capsule by mouth daily         For Pharmacy Admin Tracking Only    Program: Medication Refill  CPA in place:    Recommendation Provided To:   Intervention Detail: New Rx: 2, reason: Patient Preference  Intervention Accepted By:   Gap Closed?:    Time Spent (min): 5

## 2025-01-20 RX ORDER — OMEPRAZOLE 40 MG/1
40 CAPSULE, DELAYED RELEASE ORAL DAILY
Qty: 90 CAPSULE | Refills: 1 | Status: SHIPPED | OUTPATIENT
Start: 2025-01-20

## 2025-01-20 RX ORDER — DULOXETIN HYDROCHLORIDE 60 MG/1
60 CAPSULE, DELAYED RELEASE ORAL DAILY
Qty: 90 CAPSULE | Refills: 1 | Status: SHIPPED | OUTPATIENT
Start: 2025-01-20

## 2025-02-04 SDOH — ECONOMIC STABILITY: TRANSPORTATION INSECURITY
IN THE PAST 12 MONTHS, HAS THE LACK OF TRANSPORTATION KEPT YOU FROM MEDICAL APPOINTMENTS OR FROM GETTING MEDICATIONS?: NO

## 2025-02-04 SDOH — ECONOMIC STABILITY: TRANSPORTATION INSECURITY
IN THE PAST 12 MONTHS, HAS LACK OF TRANSPORTATION KEPT YOU FROM MEETINGS, WORK, OR FROM GETTING THINGS NEEDED FOR DAILY LIVING?: NO

## 2025-02-04 SDOH — ECONOMIC STABILITY: INCOME INSECURITY: IN THE LAST 12 MONTHS, WAS THERE A TIME WHEN YOU WERE NOT ABLE TO PAY THE MORTGAGE OR RENT ON TIME?: YES

## 2025-02-04 SDOH — ECONOMIC STABILITY: FOOD INSECURITY: WITHIN THE PAST 12 MONTHS, YOU WORRIED THAT YOUR FOOD WOULD RUN OUT BEFORE YOU GOT MONEY TO BUY MORE.: SOMETIMES TRUE

## 2025-02-04 SDOH — ECONOMIC STABILITY: FOOD INSECURITY: WITHIN THE PAST 12 MONTHS, THE FOOD YOU BOUGHT JUST DIDN'T LAST AND YOU DIDN'T HAVE MONEY TO GET MORE.: SOMETIMES TRUE

## 2025-02-05 ENCOUNTER — OFFICE VISIT (OUTPATIENT)
Facility: CLINIC | Age: 50
End: 2025-02-05
Payer: COMMERCIAL

## 2025-02-05 ENCOUNTER — HOSPITAL ENCOUNTER (OUTPATIENT)
Facility: HOSPITAL | Age: 50
Discharge: HOME | End: 2025-02-08
Payer: COMMERCIAL

## 2025-02-05 VITALS
HEART RATE: 75 BPM | OXYGEN SATURATION: 95 % | BODY MASS INDEX: 47.09 KG/M2 | SYSTOLIC BLOOD PRESSURE: 131 MMHG | RESPIRATION RATE: 12 BRPM | WEIGHT: 293 LBS | HEIGHT: 66 IN | DIASTOLIC BLOOD PRESSURE: 85 MMHG | TEMPERATURE: 98.1 F

## 2025-02-05 DIAGNOSIS — M25.551 RIGHT HIP PAIN: ICD-10-CM

## 2025-02-05 DIAGNOSIS — K21.9 GASTROESOPHAGEAL REFLUX DISEASE, UNSPECIFIED WHETHER ESOPHAGITIS PRESENT: ICD-10-CM

## 2025-02-05 DIAGNOSIS — E55.9 VITAMIN D DEFICIENCY: ICD-10-CM

## 2025-02-05 DIAGNOSIS — G44.229 CHRONIC TENSION-TYPE HEADACHE, NOT INTRACTABLE: ICD-10-CM

## 2025-02-05 DIAGNOSIS — F33.0 DEPRESSION, MAJOR, RECURRENT, MILD (HCC): ICD-10-CM

## 2025-02-05 DIAGNOSIS — E78.00 PURE HYPERCHOLESTEROLEMIA: ICD-10-CM

## 2025-02-05 DIAGNOSIS — J45.40 ASTHMA WITHOUT STATUS ASTHMATICUS, MODERATE PERSISTENT, UNCOMPLICATED: Primary | ICD-10-CM

## 2025-02-05 DIAGNOSIS — I10 PRIMARY HYPERTENSION: ICD-10-CM

## 2025-02-05 DIAGNOSIS — M79.641 RIGHT HAND PAIN: ICD-10-CM

## 2025-02-05 PROCEDURE — 73502 X-RAY EXAM HIP UNI 2-3 VIEWS: CPT

## 2025-02-05 PROCEDURE — 3075F SYST BP GE 130 - 139MM HG: CPT | Performed by: STUDENT IN AN ORGANIZED HEALTH CARE EDUCATION/TRAINING PROGRAM

## 2025-02-05 PROCEDURE — 3079F DIAST BP 80-89 MM HG: CPT | Performed by: STUDENT IN AN ORGANIZED HEALTH CARE EDUCATION/TRAINING PROGRAM

## 2025-02-05 PROCEDURE — 99214 OFFICE O/P EST MOD 30 MIN: CPT | Performed by: STUDENT IN AN ORGANIZED HEALTH CARE EDUCATION/TRAINING PROGRAM

## 2025-02-05 PROCEDURE — 73130 X-RAY EXAM OF HAND: CPT

## 2025-02-05 RX ORDER — DULOXETIN HYDROCHLORIDE 60 MG/1
60 CAPSULE, DELAYED RELEASE ORAL DAILY
Qty: 90 CAPSULE | Refills: 1 | Status: SHIPPED | OUTPATIENT
Start: 2025-02-05

## 2025-02-05 RX ORDER — ALBUTEROL SULFATE 90 UG/1
INHALANT RESPIRATORY (INHALATION)
Qty: 18 G | Refills: 2 | Status: SHIPPED | OUTPATIENT
Start: 2025-02-05

## 2025-02-05 RX ORDER — AMLODIPINE BESYLATE 5 MG/1
5 TABLET ORAL DAILY
Qty: 90 TABLET | Refills: 1 | Status: SHIPPED | OUTPATIENT
Start: 2025-02-05

## 2025-02-05 RX ORDER — ALBUTEROL SULFATE 0.83 MG/ML
2.5 SOLUTION RESPIRATORY (INHALATION) EVERY 4 HOURS PRN
Qty: 120 EACH | Refills: 2 | Status: SHIPPED | OUTPATIENT
Start: 2025-02-05

## 2025-02-05 RX ORDER — OMEPRAZOLE 40 MG/1
40 CAPSULE, DELAYED RELEASE ORAL DAILY
Qty: 90 CAPSULE | Refills: 1 | Status: SHIPPED | OUTPATIENT
Start: 2025-02-05

## 2025-02-05 RX ORDER — ATORVASTATIN CALCIUM 10 MG/1
10 TABLET, FILM COATED ORAL NIGHTLY
Qty: 90 TABLET | Refills: 1 | Status: SHIPPED | OUTPATIENT
Start: 2025-02-05

## 2025-02-05 RX ORDER — HYDROCHLOROTHIAZIDE 25 MG/1
25 TABLET ORAL DAILY
Qty: 90 TABLET | Refills: 1 | Status: SHIPPED | OUTPATIENT
Start: 2025-02-05

## 2025-02-05 RX ORDER — ERGOCALCIFEROL 1.25 MG/1
50000 CAPSULE ORAL
Qty: 12 CAPSULE | Refills: 1 | Status: SHIPPED | OUTPATIENT
Start: 2025-02-05

## 2025-02-05 RX ORDER — MONTELUKAST SODIUM 10 MG/1
10 TABLET ORAL DAILY
Qty: 90 TABLET | Refills: 1 | Status: SHIPPED | OUTPATIENT
Start: 2025-02-05

## 2025-02-05 RX ORDER — LOSARTAN POTASSIUM 100 MG/1
100 TABLET ORAL DAILY
Qty: 90 TABLET | Refills: 1 | Status: SHIPPED | OUTPATIENT
Start: 2025-02-05

## 2025-02-05 RX ORDER — PROPRANOLOL HYDROCHLORIDE 60 MG/1
60 CAPSULE, EXTENDED RELEASE ORAL DAILY
Qty: 90 CAPSULE | Refills: 1 | Status: SHIPPED | OUTPATIENT
Start: 2025-02-05

## 2025-02-05 ASSESSMENT — ANXIETY QUESTIONNAIRES
IF YOU CHECKED OFF ANY PROBLEMS ON THIS QUESTIONNAIRE, HOW DIFFICULT HAVE THESE PROBLEMS MADE IT FOR YOU TO DO YOUR WORK, TAKE CARE OF THINGS AT HOME, OR GET ALONG WITH OTHER PEOPLE: VERY DIFFICULT
7. FEELING AFRAID AS IF SOMETHING AWFUL MIGHT HAPPEN: NOT AT ALL
GAD7 TOTAL SCORE: 10
6. BECOMING EASILY ANNOYED OR IRRITABLE: SEVERAL DAYS
5. BEING SO RESTLESS THAT IT IS HARD TO SIT STILL: NEARLY EVERY DAY
4. TROUBLE RELAXING: NEARLY EVERY DAY
2. NOT BEING ABLE TO STOP OR CONTROL WORRYING: SEVERAL DAYS
3. WORRYING TOO MUCH ABOUT DIFFERENT THINGS: SEVERAL DAYS
1. FEELING NERVOUS, ANXIOUS, OR ON EDGE: SEVERAL DAYS

## 2025-02-05 ASSESSMENT — PATIENT HEALTH QUESTIONNAIRE - PHQ9
4. FEELING TIRED OR HAVING LITTLE ENERGY: NEARLY EVERY DAY
2. FEELING DOWN, DEPRESSED OR HOPELESS: SEVERAL DAYS
SUM OF ALL RESPONSES TO PHQ QUESTIONS 1-9: 10
1. LITTLE INTEREST OR PLEASURE IN DOING THINGS: SEVERAL DAYS
SUM OF ALL RESPONSES TO PHQ9 QUESTIONS 1 & 2: 2
3. TROUBLE FALLING OR STAYING ASLEEP: NOT AT ALL
SUM OF ALL RESPONSES TO PHQ QUESTIONS 1-9: 10
8. MOVING OR SPEAKING SO SLOWLY THAT OTHER PEOPLE COULD HAVE NOTICED. OR THE OPPOSITE, BEING SO FIGETY OR RESTLESS THAT YOU HAVE BEEN MOVING AROUND A LOT MORE THAN USUAL: SEVERAL DAYS
9. THOUGHTS THAT YOU WOULD BE BETTER OFF DEAD, OR OF HURTING YOURSELF: NOT AT ALL
5. POOR APPETITE OR OVEREATING: SEVERAL DAYS
7. TROUBLE CONCENTRATING ON THINGS, SUCH AS READING THE NEWSPAPER OR WATCHING TELEVISION: NEARLY EVERY DAY
10. IF YOU CHECKED OFF ANY PROBLEMS, HOW DIFFICULT HAVE THESE PROBLEMS MADE IT FOR YOU TO DO YOUR WORK, TAKE CARE OF THINGS AT HOME, OR GET ALONG WITH OTHER PEOPLE: VERY DIFFICULT
SUM OF ALL RESPONSES TO PHQ QUESTIONS 1-9: 10
6. FEELING BAD ABOUT YOURSELF - OR THAT YOU ARE A FAILURE OR HAVE LET YOURSELF OR YOUR FAMILY DOWN: NOT AT ALL
SUM OF ALL RESPONSES TO PHQ QUESTIONS 1-9: 10

## 2025-02-05 NOTE — ASSESSMENT & PLAN NOTE
Blood pressure in office today 131/85, continue current regimen.  Plans to complete labs during next office visit to assess renal function.  Of note, her creatinine was mildly elevated during last lab draw.

## 2025-02-05 NOTE — PROGRESS NOTES
\"Have you been to the ER, urgent care clinic since your last visit?  Hospitalized since your last visit?\"    YES - When: approximately 2  weeks ago.  Where and Why: 1.07.25 - chest pains.    “Have you seen or consulted any other health care providers outside our system since your last visit?”    NO

## 2025-02-05 NOTE — ASSESSMENT & PLAN NOTE
Continue current medication regimen, Lipitor.  Refill sent to pharmacy.  No new myalgias or GI upset on current medications. Medication compliance: Yes. Patient is following a low fat, low cholesterol diet. Encouraged regular exercise.

## 2025-02-05 NOTE — ASSESSMENT & PLAN NOTE
Ongoing right hip pain.  Will obtain x-ray for further evaluation.  Continue Tylenol as needed.  Low threshold for physical therapy referral.

## 2025-02-05 NOTE — ASSESSMENT & PLAN NOTE
PHQ-9 in office today 10, moderate depression.  Continue duloxetine 60 mg.  Will reassess during next office visit.  In the event her score has worsened, will consider increasing dose.  Can also consider therapist as well.

## 2025-02-05 NOTE — ASSESSMENT & PLAN NOTE
Hx of low vit D; vit D level collected today. Continue daily supplementation.  Refill sent to pharmacy.

## 2025-02-05 NOTE — PROGRESS NOTES
Chiara Bacon a 49 y.o. female  has a past medical history of Autoimmune disease (HCC), BMI 45.0-49.9, adult, Chronic pain, Depression, Essential hypertension, Gastroesophageal reflux disease without esophagitis, H/O tubal ligation, Iron deficiency anemia due to chronic blood loss, Menorrhagia with regular cycle, Migraine without aura and without status migrainosus, not intractable, Mild intermittent asthma without complication, MS (multiple sclerosis) (HCC), Obesity, Class III, BMI 40-49.9 (morbid obesity), EDUARDO on CPAP, S/P endometrial ablation, and Vitamin D deficiency. presents to office today for ED visit follow up.     Subjective:    Atypical Chest Pain in the setting of Asthma   -Patient reports that she visited the ED 1/7/2025 due to chest discomfort.  She notes that she was laying in her bed at night and she had increased anxiety with difficulty breathing.  She reports that since her ED visit, she has not had any reoccurrence of symptoms.  Patient reports that she has not scheduled an appointment with cardiology yet.  She notes that she has been using her inhaler twice daily as prescribed in addition to her rescue inhaler as needed.  She states that she has had symptomatic improvement with her breathing, and states that Flonase has also helped.  Patient reports that she was previously on Trelegy and derives good benefit, she request refills.  She notes that she has never consulted with pulmonology in the past.  - previously consulted with cardiology, Dr. Lau, 3/2024. Per documentation, completed nuclear stress test 2/2023. EKG at that time nrs, s/p CTA coronary angiography 9/2023, no evidence of coronary disease. Mild myocardial bridging in the distal LAD. Small pulmonary nodule 4mm detected in LLL, no recs for further investigation. At that time, consideration for pulm evaluation; however, no further workup was pursued.     Right Hand Pain   -Patient complains of right hand pain.  She notes

## 2025-02-05 NOTE — ASSESSMENT & PLAN NOTE
Patient experiencing right hand pain after sustaining fall.  Given history and physical exam findings, concerns for possible fracture.  Will obtain x-ray for further investigation.

## 2025-02-05 NOTE — PATIENT INSTRUCTIONS
Please call Central Scheduling [phone] to schedule your: XR right hip, XR right hand    You have been referred to: pulmonology   **Please allow up to 2 weeks for their office to call you and schedule. If you have not heard from them beyond 2 weeks, contact their office directly to schedule an appointment.        [facility]UDH[facility]  [address]  [facility] [address] [phone]  Call   as soon as jack to schedule an appointment

## 2025-03-25 DIAGNOSIS — M25.551 RIGHT HIP PAIN: Primary | ICD-10-CM

## 2025-03-27 ENCOUNTER — HOSPITAL ENCOUNTER (OUTPATIENT)
Facility: HOSPITAL | Age: 50
Setting detail: INFUSION SERIES
End: 2025-03-27

## 2025-04-18 ENCOUNTER — RESULTS FOLLOW-UP (OUTPATIENT)
Facility: CLINIC | Age: 50
End: 2025-04-18

## 2025-04-18 ENCOUNTER — HOSPITAL ENCOUNTER (OUTPATIENT)
Facility: HOSPITAL | Age: 50
Discharge: HOME | End: 2025-04-21
Payer: COMMERCIAL

## 2025-04-18 DIAGNOSIS — Z12.31 VISIT FOR SCREENING MAMMOGRAM: ICD-10-CM

## 2025-04-18 PROCEDURE — 77063 BREAST TOMOSYNTHESIS BI: CPT

## 2025-05-05 ENCOUNTER — OFFICE VISIT (OUTPATIENT)
Facility: CLINIC | Age: 50
End: 2025-05-05
Payer: COMMERCIAL

## 2025-05-05 VITALS
OXYGEN SATURATION: 96 % | DIASTOLIC BLOOD PRESSURE: 87 MMHG | BODY MASS INDEX: 51.65 KG/M2 | HEART RATE: 62 BPM | SYSTOLIC BLOOD PRESSURE: 120 MMHG | WEIGHT: 293 LBS

## 2025-05-05 DIAGNOSIS — E78.2 MIXED HYPERLIPIDEMIA: ICD-10-CM

## 2025-05-05 DIAGNOSIS — I10 PRIMARY HYPERTENSION: Primary | ICD-10-CM

## 2025-05-05 DIAGNOSIS — J45.40 ASTHMA WITHOUT STATUS ASTHMATICUS, MODERATE PERSISTENT, UNCOMPLICATED: ICD-10-CM

## 2025-05-05 DIAGNOSIS — M16.11 PRIMARY OSTEOARTHRITIS OF RIGHT HIP: ICD-10-CM

## 2025-05-05 DIAGNOSIS — E66.01 MORBID (SEVERE) OBESITY DUE TO EXCESS CALORIES (HCC): ICD-10-CM

## 2025-05-05 DIAGNOSIS — E55.9 VITAMIN D DEFICIENCY: ICD-10-CM

## 2025-05-05 LAB
COMMENT:: NORMAL
SPECIMEN HOLD: NORMAL

## 2025-05-05 PROCEDURE — 99214 OFFICE O/P EST MOD 30 MIN: CPT | Performed by: STUDENT IN AN ORGANIZED HEALTH CARE EDUCATION/TRAINING PROGRAM

## 2025-05-05 PROCEDURE — 3074F SYST BP LT 130 MM HG: CPT | Performed by: STUDENT IN AN ORGANIZED HEALTH CARE EDUCATION/TRAINING PROGRAM

## 2025-05-05 PROCEDURE — 3079F DIAST BP 80-89 MM HG: CPT | Performed by: STUDENT IN AN ORGANIZED HEALTH CARE EDUCATION/TRAINING PROGRAM

## 2025-05-05 ASSESSMENT — PATIENT HEALTH QUESTIONNAIRE - PHQ9
1. LITTLE INTEREST OR PLEASURE IN DOING THINGS: NOT AT ALL
SUM OF ALL RESPONSES TO PHQ QUESTIONS 1-9: 0
SUM OF ALL RESPONSES TO PHQ QUESTIONS 1-9: 0
2. FEELING DOWN, DEPRESSED OR HOPELESS: NOT AT ALL
SUM OF ALL RESPONSES TO PHQ QUESTIONS 1-9: 0
SUM OF ALL RESPONSES TO PHQ QUESTIONS 1-9: 0

## 2025-05-05 NOTE — PROGRESS NOTES
Chiara Bacon a 50 y.o. female  has a past medical history of Autoimmune disease, BMI 45.0-49.9, adult ([facility]), Chronic pain, Depression, Essential hypertension, Gastroesophageal reflux disease without esophagitis, H/O tubal ligation, Inverted nipple, Iron deficiency anemia due to chronic blood loss, Menorrhagia with regular cycle, Migraine without aura and without status migrainosus, not intractable, Mild intermittent asthma without complication, MS (multiple sclerosis) ([facility]), Obesity, Class III, BMI 40-49.9 (morbid obesity) ([facility]), EDUARDO on CPAP, S/P endometrial ablation, and Vitamin D deficiency. presents to office today for HTN follow up.     Subjective:      History of Present Illness    Right Hip OA  She has been experiencing discomfort in her sacroiliac (SI) joint, which was previously managed with an injection. However, the most recent injection, administered last week, resulted in numbness throughout her leg, causing her to fall when attempting to stand. She is currently undergoing physical therapy 2 times a week, which she reports as being challenging.    Asthma   Her respiratory function is generally satisfactory, but she experiences difficulty breathing during extreme weather conditions, both hot and cold. She has a scheduled appointment with pulmonology in 05/2025. She utilizes Advair and an inhaler as needed, and continues to take Singulair at night.    Hypertension  - current meds:amlodipine, hydrochlorothiazide, losartan, and propranolol.   - take as prescribed: yes  - home readings:  stable   - excess sodium consumption: she limits   - etOH use, nicotine use: no  - headaches, angina, vision change, SOB, dizziness: denies   - follows with cardiology    HLD  - meds: lipitor   - red meat, processed meats, dairy, baked goods, fried foods: she limits  - prior hx of heart attack, stroke, peripheral artery disease:  no  - The 10-year ASCVD risk score (Kelly KASPER, et al., 2019) is: 2.4%    Values used

## 2025-05-05 NOTE — PROGRESS NOTES
Chief Complaint   Patient presents with    3 Month Follow-Up         Health Maintenance Due   Topic Date Due    Pneumococcal 0-49 years Vaccine (2 of 2 - PCV) 01/13/2021    COVID-19 Vaccine (7 - 2024-25 season) 09/01/2024         \"Have you been to the ER, urgent care clinic since your last visit?  Hospitalized since your last visit?\"    NO    “Have you seen or consulted any other health care providers outside of [facility] since your last visit?”    NO

## 2025-05-06 ENCOUNTER — RESULTS FOLLOW-UP (OUTPATIENT)
Facility: CLINIC | Age: 50
End: 2025-05-06

## 2025-05-06 LAB
25(OH)D3 SERPL-MCNC: 64.8 NG/ML (ref 30–100)
ALBUMIN SERPL-MCNC: 4.1 G/DL (ref 3.5–5)
ALBUMIN/GLOB SERPL: 1.3 (ref 1.1–2.2)
ALP SERPL-CCNC: 73 U/L (ref 45–117)
ALT SERPL-CCNC: 37 U/L (ref 12–78)
ANION GAP SERPL CALC-SCNC: 4 MMOL/L (ref 2–12)
AST SERPL-CCNC: 18 U/L (ref 15–37)
BILIRUB SERPL-MCNC: 0.5 MG/DL (ref 0.2–1)
BUN SERPL-MCNC: 18 MG/DL (ref 6–20)
BUN/CREAT SERPL: 15 (ref 12–20)
CALCIUM SERPL-MCNC: 10.5 MG/DL (ref 8.5–10.1)
CHLORIDE SERPL-SCNC: 101 MMOL/L (ref 97–108)
CHOLEST SERPL-MCNC: 249 MG/DL
CO2 SERPL-SCNC: 33 MMOL/L (ref 21–32)
CREAT SERPL-MCNC: 1.17 MG/DL (ref 0.55–1.02)
GLOBULIN SER CALC-MCNC: 3.2 G/DL (ref 2–4)
GLUCOSE SERPL-MCNC: 89 MG/DL (ref 65–100)
HDLC SERPL-MCNC: 81 MG/DL
HDLC SERPL: 3.1 (ref 0–5)
LDLC SERPL CALC-MCNC: 145 MG/DL (ref 0–100)
POTASSIUM SERPL-SCNC: 4 MMOL/L (ref 3.5–5.1)
PROT SERPL-MCNC: 7.3 G/DL (ref 6.4–8.2)
SODIUM SERPL-SCNC: 138 MMOL/L (ref 136–145)
TRIGL SERPL-MCNC: 115 MG/DL
VLDLC SERPL CALC-MCNC: 23 MG/DL

## 2025-05-06 NOTE — RESULT ENCOUNTER NOTE
Please notify patient.  Labs stable.  Mixed hyperlipidemia noted (improved compared to prior), lifestyle modification encouraged with routine aerobic exercise and dietary modification. Continue lipitor 10mg daily.   Diminished renal function noted, will reassess during next office visit. Avoid NSAIDs.

## 2025-05-17 DIAGNOSIS — J45.40 ASTHMA WITHOUT STATUS ASTHMATICUS, MODERATE PERSISTENT, UNCOMPLICATED: ICD-10-CM

## 2025-05-18 ENCOUNTER — TRANSCRIBE ORDERS (OUTPATIENT)
Facility: HOSPITAL | Age: 50
End: 2025-05-18

## 2025-05-18 DIAGNOSIS — J45.909 EXTRINSIC ASTHMA, UNSPECIFIED ASTHMA SEVERITY, UNSPECIFIED WHETHER COMPLICATED, UNSPECIFIED WHETHER PERSISTENT: Primary | ICD-10-CM

## 2025-05-19 RX ORDER — FLUTICASONE PROPIONATE AND SALMETEROL 250; 50 UG/1; UG/1
1 POWDER RESPIRATORY (INHALATION) EVERY 12 HOURS
Qty: 60 EACH | Refills: 3 | OUTPATIENT
Start: 2025-05-19

## 2025-06-04 DIAGNOSIS — J45.40 ASTHMA WITHOUT STATUS ASTHMATICUS, MODERATE PERSISTENT, UNCOMPLICATED: ICD-10-CM

## 2025-06-05 RX ORDER — FLUTICASONE PROPIONATE AND SALMETEROL 250; 50 UG/1; UG/1
1 POWDER RESPIRATORY (INHALATION) EVERY 12 HOURS
Qty: 60 EACH | Refills: 3 | OUTPATIENT
Start: 2025-06-05

## 2025-06-09 DIAGNOSIS — J45.40 ASTHMA WITHOUT STATUS ASTHMATICUS, MODERATE PERSISTENT, UNCOMPLICATED: ICD-10-CM

## 2025-06-09 RX ORDER — FLUTICASONE PROPIONATE AND SALMETEROL 250; 50 UG/1; UG/1
1 POWDER RESPIRATORY (INHALATION) EVERY 12 HOURS
Qty: 60 EACH | Refills: 3 | Status: SHIPPED | OUTPATIENT
Start: 2025-06-09
